# Patient Record
Sex: MALE | Race: WHITE | Employment: OTHER
[De-identification: names, ages, dates, MRNs, and addresses within clinical notes are randomized per-mention and may not be internally consistent; named-entity substitution may affect disease eponyms.]

---

## 2023-03-07 ENCOUNTER — TELEPHONE (OUTPATIENT)
Dept: CASE MANAGEMENT | Age: 78
End: 2023-03-07

## 2023-03-07 VITALS — HEIGHT: 65 IN

## 2023-03-07 NOTE — TELEPHONE ENCOUNTER
I called the patient and he confirmed his CT lung screening at [facility] on 3/8/2023 at 7:00 am.  I reminded the patient to arrive at 6:45 am, enter through the main entrance, and register. Patient confirmed.           Electronically signed by Taryn Neely on 3/7/23 at 10:35 AM EST

## 2023-03-08 ENCOUNTER — HOSPITAL ENCOUNTER (OUTPATIENT)
Dept: CT IMAGING | Age: 78
Discharge: HOME | End: 2023-03-10
Payer: OTHER GOVERNMENT

## 2023-03-08 DIAGNOSIS — F17.200 CURRENT SMOKER: ICD-10-CM

## 2023-03-08 PROCEDURE — 71271 CT THORAX LUNG CANCER SCR C-: CPT

## 2023-03-09 ENCOUNTER — TELEPHONE (OUTPATIENT)
Dept: CASE MANAGEMENT | Age: 78
End: 2023-03-09

## 2023-03-09 NOTE — TELEPHONE ENCOUNTER
No call, encounter opened to process CT Lung Screening. CT Lung Screen: 3/8/2023    Impression   1. There is no pulmonary infiltrate or suspicious pulmonary mass   2. Emphysematous change   3. Pleural thickening seen within the right lung base medially   4. 3 pleural based soft tissue nodule seen within the right lung base   abutting the hemidiaphragm, largest measures 6 mm. 5. Within the lumen of the trachea of posteriorly there is a 1.4 x 1.0 cm   soft tissue density which could represent retained lay of mucous secretions. LUNG RADS:   Lung-RADS 2 - Benign ()       Management:  12 month screening LDCT       RECOMMENDATIONS:   If you would like to register your patient with the [facility], please contact the Nurse Navigator at   [phone]. Pack years: 54    Social History     Tobacco Use  Smoking Status: Current Every Day Smoker    Start Date: 55   Quit Date:    Types: Cigarettes   Packs/Day: 1   Years: 61   Pack Years: 61   Smokeless Tobacco:         Results letter sent to patient via my chart or mailed.      [facility]

## 2024-03-29 ENCOUNTER — HOSPITAL ENCOUNTER (OUTPATIENT)
Dept: CT IMAGING | Age: 79
Discharge: HOME | End: 2024-03-29
Payer: OTHER GOVERNMENT

## 2024-03-29 DIAGNOSIS — Z72.0 TOBACCO USE: ICD-10-CM

## 2024-03-29 DIAGNOSIS — F17.200 CURRENT SMOKER: ICD-10-CM

## 2024-03-29 PROCEDURE — 71271 CT THORAX LUNG CANCER SCR C-: CPT

## 2024-04-01 ENCOUNTER — TELEPHONE (OUTPATIENT)
Dept: CASE MANAGEMENT | Age: 79
End: 2024-04-01

## 2024-04-01 NOTE — TELEPHONE ENCOUNTER
No call, encounter opened to process CT Lung Screening.    CT Lung Screen: 3/29/2024    IMPRESSION:  1. Markedly abnormal irregular pleural thickening seen throughout the right  lung extending into the minor and major fissures.  The irregular pleural  thickening is most prominent within the right lung base medially and measure  up to 4.0 cm in maximum with and extends along the medial pleural surface  into the right infrahilar region.  Malignancy is suspected.  PET-CT scan is  recommended followed by CT-guided biopsy.  2. Multiple nodule seen within the right lung more prominent within the right  lower lobe.  The largest measures 5.5 mm.  3. Shotty lymphadenopathy within the right paratracheal region.  4. The left lung is clear..     LUNG RADS:  Lung-RADS 4B - Very Suspicious (v2022)     Management:; PET/CT may be considered if there is a >= 8 mm solid nodule or  solid component; tissue sampling; and/or referral for further clinical  evaluation.  Management depends on clinical evaluation, patient preference,  and the probability of malignancy.     RECOMMENDATIONS:  If you would like to register your patient with the [facility] Lung Nodule/Lung  Cancer Screening Program, please contact the Nurse Navigator at  [phone].     Pack years: 64.2    Social History     Tobacco Use  Smoking Status: Current Every Day Smoker    Start Date: 1960   Quit Date:    Types: Cigarettes   Packs/Day: 1   Years: 64.2   Pack Years: 64.2   Smokeless Tobacco:         Results letter sent to patient via my chart or mailed.     Harrison Taylor  Imaging Navigator   [facility]   [phone]

## 2024-04-03 ENCOUNTER — APPOINTMENT (OUTPATIENT)
Dept: CT IMAGING | Age: 79
End: 2024-04-03
Payer: OTHER GOVERNMENT

## 2024-04-03 ENCOUNTER — HOSPITAL ENCOUNTER (OUTPATIENT)
Age: 79
Setting detail: OBSERVATION
Discharge: HOME | End: 2024-04-05
Attending: EMERGENCY MEDICINE | Admitting: INTERNAL MEDICINE
Payer: OTHER GOVERNMENT

## 2024-04-03 DIAGNOSIS — R91.8 PULMONARY NODULES/LESIONS, MULTIPLE: ICD-10-CM

## 2024-04-03 DIAGNOSIS — N20.0 KIDNEY STONE: Primary | ICD-10-CM

## 2024-04-03 PROBLEM — J90 RECURRENT RIGHT PLEURAL EFFUSION: Status: ACTIVE | Noted: 2024-04-03

## 2024-04-03 PROBLEM — J90 PLEURAL EFFUSION: Status: ACTIVE | Noted: 2024-04-03

## 2024-04-03 LAB
ANION GAP SERPL CALCULATED.3IONS-SCNC: 11 MMOL/L (ref 7–16)
BACTERIA URNS QL MICRO: ABNORMAL
BILIRUB UR QL STRIP: NEGATIVE
BUN SERPL-MCNC: 14 MG/DL (ref 6–23)
CALCIUM SERPL-MCNC: 9.5 MG/DL (ref 8.6–10.2)
CHLORIDE SERPL-SCNC: 103 MMOL/L (ref 98–107)
CLARITY UR: CLEAR
CO2 SERPL-SCNC: 25 MMOL/L (ref 22–29)
COLOR UR: YELLOW
CREAT SERPL-MCNC: 1.1 MG/DL (ref 0.7–1.2)
ERYTHROCYTE [DISTWIDTH] IN BLOOD BY AUTOMATED COUNT: 13.3 % (ref 11.5–15)
GFR SERPL CREATININE-BSD FRML MDRD: 71 ML/MIN/1.73M2
GLUCOSE SERPL-MCNC: 121 MG/DL (ref 74–99)
GLUCOSE UR STRIP-MCNC: NEGATIVE MG/DL
HCT VFR BLD AUTO: 51.6 % (ref 37–54)
HGB BLD-MCNC: 17.1 G/DL (ref 12.5–16.5)
HGB UR QL STRIP.AUTO: ABNORMAL
KETONES UR STRIP-MCNC: NEGATIVE MG/DL
LACTATE BLDV-SCNC: 2.6 MMOL/L (ref 0.5–2.2)
LEUKOCYTE ESTERASE UR QL STRIP: ABNORMAL
MCH RBC QN AUTO: 30.1 PG (ref 26–35)
MCHC RBC AUTO-ENTMCNC: 33.1 G/DL (ref 32–34.5)
MCV RBC AUTO: 90.8 FL (ref 80–99.9)
MUCOUS THREADS URNS QL MICRO: PRESENT
NITRITE UR QL STRIP: NEGATIVE
PH UR STRIP: 6 [PH] (ref 5–9)
PLATELET # BLD AUTO: 281 K/UL (ref 130–450)
PMV BLD AUTO: 9 FL (ref 7–12)
POTASSIUM SERPL-SCNC: 3.9 MMOL/L (ref 3.5–5)
PROT UR STRIP-MCNC: 30 MG/DL
RBC # BLD AUTO: 5.68 M/UL (ref 3.8–5.8)
RBC #/AREA URNS HPF: ABNORMAL /HPF
SODIUM SERPL-SCNC: 139 MMOL/L (ref 132–146)
SP GR UR STRIP: 1.02 (ref 1–1.03)
UROBILINOGEN UR STRIP-ACNC: 0.2 EU/DL (ref 0–1)
WBC #/AREA URNS HPF: ABNORMAL /HPF
WBC OTHER # BLD: 10 K/UL (ref 4.5–11.5)

## 2024-04-03 PROCEDURE — 96361 HYDRATE IV INFUSION ADD-ON: CPT

## 2024-04-03 PROCEDURE — 2580000003 HC RX 258: Performed by: EMERGENCY MEDICINE

## 2024-04-03 PROCEDURE — 96376 TX/PRO/DX INJ SAME DRUG ADON: CPT

## 2024-04-03 PROCEDURE — 6360000002 HC RX W HCPCS: Performed by: EMERGENCY MEDICINE

## 2024-04-03 PROCEDURE — 81001 URINALYSIS AUTO W/SCOPE: CPT

## 2024-04-03 PROCEDURE — 2580000003 HC RX 258: Performed by: HOSPITALIST

## 2024-04-03 PROCEDURE — 74176 CT ABD & PELVIS W/O CONTRAST: CPT

## 2024-04-03 PROCEDURE — 83605 ASSAY OF LACTIC ACID: CPT

## 2024-04-03 PROCEDURE — G0378 HOSPITAL OBSERVATION PER HR: HCPCS

## 2024-04-03 PROCEDURE — 99285 EMERGENCY DEPT VISIT HI MDM: CPT

## 2024-04-03 PROCEDURE — 6370000000 HC RX 637 (ALT 250 FOR IP): Performed by: EMERGENCY MEDICINE

## 2024-04-03 PROCEDURE — 87086 URINE CULTURE/COLONY COUNT: CPT

## 2024-04-03 PROCEDURE — 80048 BASIC METABOLIC PNL TOTAL CA: CPT

## 2024-04-03 PROCEDURE — 96375 TX/PRO/DX INJ NEW DRUG ADDON: CPT

## 2024-04-03 PROCEDURE — 96374 THER/PROPH/DIAG INJ IV PUSH: CPT

## 2024-04-03 PROCEDURE — 85027 COMPLETE CBC AUTOMATED: CPT

## 2024-04-03 RX ORDER — MAGNESIUM SULFATE IN WATER 40 MG/ML
2000 INJECTION, SOLUTION INTRAVENOUS PRN
Status: DISCONTINUED | OUTPATIENT
Start: 2024-04-03 | End: 2024-04-05 | Stop reason: HOSPADM

## 2024-04-03 RX ORDER — ACETAMINOPHEN 325 MG/1
650 TABLET ORAL EVERY 6 HOURS PRN
Status: DISCONTINUED | OUTPATIENT
Start: 2024-04-03 | End: 2024-04-05 | Stop reason: HOSPADM

## 2024-04-03 RX ORDER — POLYETHYLENE GLYCOL 3350 17 G/17G
17 POWDER, FOR SOLUTION ORAL DAILY PRN
Status: DISCONTINUED | OUTPATIENT
Start: 2024-04-03 | End: 2024-04-05 | Stop reason: HOSPADM

## 2024-04-03 RX ORDER — POTASSIUM CHLORIDE 7.45 MG/ML
10 INJECTION INTRAVENOUS PRN
Status: DISCONTINUED | OUTPATIENT
Start: 2024-04-03 | End: 2024-04-05 | Stop reason: HOSPADM

## 2024-04-03 RX ORDER — ACETAMINOPHEN 650 MG/1
650 SUPPOSITORY RECTAL EVERY 6 HOURS PRN
Status: DISCONTINUED | OUTPATIENT
Start: 2024-04-03 | End: 2024-04-05 | Stop reason: HOSPADM

## 2024-04-03 RX ORDER — KETOROLAC TROMETHAMINE 15 MG/ML
15 INJECTION, SOLUTION INTRAMUSCULAR; INTRAVENOUS ONCE
Status: COMPLETED | OUTPATIENT
Start: 2024-04-03 | End: 2024-04-03

## 2024-04-03 RX ORDER — SODIUM CHLORIDE 9 MG/ML
INJECTION, SOLUTION INTRAVENOUS PRN
Status: DISCONTINUED | OUTPATIENT
Start: 2024-04-03 | End: 2024-04-05 | Stop reason: HOSPADM

## 2024-04-03 RX ORDER — SODIUM CHLORIDE 0.9 % (FLUSH) 0.9 %
5-40 SYRINGE (ML) INJECTION PRN
Status: DISCONTINUED | OUTPATIENT
Start: 2024-04-03 | End: 2024-04-05 | Stop reason: HOSPADM

## 2024-04-03 RX ORDER — TAMSULOSIN HYDROCHLORIDE 0.4 MG/1
0.4 CAPSULE ORAL DAILY
Status: DISCONTINUED | OUTPATIENT
Start: 2024-04-03 | End: 2024-04-05 | Stop reason: HOSPADM

## 2024-04-03 RX ORDER — ONDANSETRON 4 MG/1
4 TABLET, ORALLY DISINTEGRATING ORAL EVERY 8 HOURS PRN
Status: DISCONTINUED | OUTPATIENT
Start: 2024-04-03 | End: 2024-04-05 | Stop reason: HOSPADM

## 2024-04-03 RX ORDER — ONDANSETRON 2 MG/ML
4 INJECTION INTRAMUSCULAR; INTRAVENOUS ONCE
Status: COMPLETED | OUTPATIENT
Start: 2024-04-03 | End: 2024-04-03

## 2024-04-03 RX ORDER — POTASSIUM CHLORIDE 20 MEQ/1
40 TABLET, EXTENDED RELEASE ORAL PRN
Status: DISCONTINUED | OUTPATIENT
Start: 2024-04-03 | End: 2024-04-05 | Stop reason: HOSPADM

## 2024-04-03 RX ORDER — ONDANSETRON 2 MG/ML
4 INJECTION INTRAMUSCULAR; INTRAVENOUS EVERY 6 HOURS PRN
Status: DISCONTINUED | OUTPATIENT
Start: 2024-04-03 | End: 2024-04-05 | Stop reason: HOSPADM

## 2024-04-03 RX ORDER — ENOXAPARIN SODIUM 100 MG/ML
40 INJECTION SUBCUTANEOUS DAILY
Status: DISCONTINUED | OUTPATIENT
Start: 2024-04-03 | End: 2024-04-05 | Stop reason: HOSPADM

## 2024-04-03 RX ORDER — ATORVASTATIN CALCIUM 10 MG/1
5 TABLET, FILM COATED ORAL DAILY
COMMUNITY

## 2024-04-03 RX ORDER — ACETAMINOPHEN 160 MG
2000 TABLET,DISINTEGRATING ORAL DAILY
COMMUNITY

## 2024-04-03 RX ORDER — SODIUM CHLORIDE 0.9 % (FLUSH) 0.9 %
5-40 SYRINGE (ML) INJECTION EVERY 12 HOURS SCHEDULED
Status: DISCONTINUED | OUTPATIENT
Start: 2024-04-03 | End: 2024-04-05 | Stop reason: HOSPADM

## 2024-04-03 RX ORDER — ASPIRIN 81 MG/1
81 TABLET, CHEWABLE ORAL DAILY
COMMUNITY

## 2024-04-03 RX ORDER — KETOROLAC TROMETHAMINE 15 MG/ML
15 INJECTION, SOLUTION INTRAMUSCULAR; INTRAVENOUS EVERY 6 HOURS PRN
Status: DISCONTINUED | OUTPATIENT
Start: 2024-04-03 | End: 2024-04-05 | Stop reason: HOSPADM

## 2024-04-03 RX ORDER — 0.9 % SODIUM CHLORIDE 0.9 %
1000 INTRAVENOUS SOLUTION INTRAVENOUS ONCE
Status: COMPLETED | OUTPATIENT
Start: 2024-04-03 | End: 2024-04-03

## 2024-04-03 RX ADMIN — SODIUM CHLORIDE, PRESERVATIVE FREE 10 ML: 5 INJECTION INTRAVENOUS at 23:56

## 2024-04-03 RX ADMIN — SODIUM CHLORIDE 1000 ML: 9 INJECTION, SOLUTION INTRAVENOUS at 11:01

## 2024-04-03 RX ADMIN — KETOROLAC TROMETHAMINE 15 MG: 15 INJECTION, SOLUTION INTRAMUSCULAR; INTRAVENOUS at 16:27

## 2024-04-03 RX ADMIN — KETOROLAC TROMETHAMINE 15 MG: 15 INJECTION, SOLUTION INTRAMUSCULAR; INTRAVENOUS at 11:00

## 2024-04-03 RX ADMIN — ONDANSETRON 4 MG: 2 INJECTION INTRAMUSCULAR; INTRAVENOUS at 11:00

## 2024-04-03 RX ADMIN — TAMSULOSIN HYDROCHLORIDE 0.4 MG: 0.4 CAPSULE ORAL at 12:46

## 2024-04-03 ASSESSMENT — PAIN DESCRIPTION - ONSET: ONSET: ON-GOING

## 2024-04-03 ASSESSMENT — LIFESTYLE VARIABLES
HOW OFTEN DO YOU HAVE A DRINK CONTAINING ALCOHOL: NEVER
HOW MANY STANDARD DRINKS CONTAINING ALCOHOL DO YOU HAVE ON A TYPICAL DAY: PATIENT DOES NOT DRINK

## 2024-04-03 ASSESSMENT — PAIN DESCRIPTION - PAIN TYPE: TYPE: ACUTE PAIN

## 2024-04-03 ASSESSMENT — PAIN SCALES - GENERAL
PAINLEVEL_OUTOF10: 0
PAINLEVEL_OUTOF10: 10

## 2024-04-03 ASSESSMENT — PAIN DESCRIPTION - ORIENTATION: ORIENTATION: RIGHT;LOWER

## 2024-04-03 ASSESSMENT — PAIN - FUNCTIONAL ASSESSMENT
PAIN_FUNCTIONAL_ASSESSMENT: 0-10
PAIN_FUNCTIONAL_ASSESSMENT: NONE - DENIES PAIN

## 2024-04-03 ASSESSMENT — PAIN DESCRIPTION - FREQUENCY: FREQUENCY: CONTINUOUS

## 2024-04-03 ASSESSMENT — PAIN DESCRIPTION - LOCATION: LOCATION: BACK

## 2024-04-03 NOTE — PROGRESS NOTES
Consult completed to Dr. Juarez at this time    Electronically signed by Yasemin Torre RN on 4/3/2024 at 6:55 PM

## 2024-04-03 NOTE — CARE COORDINATION
Called to admit patient with kidney stone. Pain resolved, found to have recurrent pulmonary nodules worsening, now with effusion. Has not seen pulm or followed up as previously instructed. Will admit for further evaluation.    Admit orders placed  Will see in the AM  Pulm consulted.    Electronically signed by Keenan Stauffer MD on 4/3/2024 at 1:54 PM

## 2024-04-03 NOTE — ED PROVIDER NOTES
HPI:  4/3/24, Time: 10:25 AM EDT         Terell Martinez is a 78 y.o. male presenting to the ED for sudden onset RT flank pain NV, beginning several hours ago.  The complaint has been persistent, severe in severity, and worsened by nothing.  Patient said the pain just sharp radiating to the right flank area.  Onset while he was urinating this morning.  He denies any hematuria.  No previous history of renal calculi.  No fevers or chills reported.    Review of Systems:   A complete review of systems was performed and pertinent positives and negatives are stated within HPI, all other systems reviewed and are negative.    --------------------------------------------- PAST HISTORY ---------------------------------------------  Past Medical History:  has no past medical history on file.    Past Surgical History:  has no past surgical history on file.    Social History:  reports that he has been smoking cigarettes. He started smoking about 64 years ago. He has a 64.3 pack-year smoking history. He does not have any smokeless tobacco history on file.    Family History: family history is not on file.     The patient’s home medications have been reviewed.    Allergies: Patient has no known allergies.    -------------------------------------------------- RESULTS -------------------------------------------------  All laboratory and radiology results have been personally reviewed by myself   LABS:  Results for orders placed or performed during the hospital encounter of 04/03/24   CBC   Result Value Ref Range    WBC 10.0 4.5 - 11.5 k/uL    RBC 5.68 3.80 - 5.80 m/uL    Hemoglobin 17.1 (H) 12.5 - 16.5 g/dL    Hematocrit 51.6 37.0 - 54.0 %    MCV 90.8 80.0 - 99.9 fL    MCH 30.1 26.0 - 35.0 pg    MCHC 33.1 32.0 - 34.5 g/dL    RDW 13.3 11.5 - 15.0 %    Platelets 281 130 - 450 k/uL    MPV 9.0 7.0 - 12.0 fL   Basic Metabolic Panel   Result Value Ref Range    Sodium 139 132 - 146 mmol/L    Potassium 3.9 3.5 - 5.0 mmol/L    Chloride 103 98  extending into the minor major fissures.  Multiple pulmonary nodules seen in the right lung with the largest measuring 5.5 mm.  Left lung is clear.  At time they recommend PET CT scan.  Followed by CT-guided biopsy.    Records Reviewed: as above        I am the Primary Clinician of Record.    DISPOSITION: Admit to telemetry     ED COURSE:  ED Course as of 04/05/24 0547 Wed Apr 03, 2024   1358 Consultation was made with Dr. Stauffer from [facility] to admit to the hospital. [JN]      ED Course User Index  [JN] Misbah Castillo DO       Counseling:   The emergency provider has spoken with the patient and discussed today’s results, in addition to providing specific details for the plan of care and counseling regarding the diagnosis and prognosis.  Questions are answered at this time and they are agreeable with the plan.      --------------------------------- IMPRESSION AND DISPOSITION ---------------------------------    IMPRESSION  1. Kidney stone    2. Pulmonary nodules/lesions, multiple        DISPOSITION  Disposition: Admit to telemetry  Patient condition is stable      NOTE: This report was transcribed using voice recognition software. Every effort was made to ensure accuracy; however, inadvertent computerized transcription errors may be present       Misbah Castillo DO  04/05/24 0547

## 2024-04-03 NOTE — ED NOTES
ED to Inpatient Handoff Report    Notified warren   that electronic handoff available and patient ready for transport to room 539.    Safety Risks: None identified    Patient in Restraints: no    Constant Observer or Patient : no    Telemetry Monitoring Ordered: No          Order to transfer to unit without monitor: NO    Last MEWS: 1 Time completed: 1808    Deterioration Index: 20.33    Vitals:    04/03/24 1007 04/03/24 1439   BP: 125/70 136/77   Pulse: 71 79   Resp: 16 18   Temp: 97.5 °F (36.4 °C) 97.5 °F (36.4 °C)   TempSrc: Temporal Oral   SpO2: 100% 96%   Weight: 76.2 kg (168 lb)    Height: 1.651 m (5' 5\")        Opportunity for questions and clarification was provided.

## 2024-04-04 LAB
ANION GAP SERPL CALCULATED.3IONS-SCNC: 9 MMOL/L (ref 7–16)
BASOPHILS # BLD: 0.04 K/UL (ref 0–0.2)
BASOPHILS NFR BLD: 1 % (ref 0–2)
BUN SERPL-MCNC: 15 MG/DL (ref 6–23)
CALCIUM SERPL-MCNC: 9.1 MG/DL (ref 8.6–10.2)
CHLORIDE SERPL-SCNC: 108 MMOL/L (ref 98–107)
CO2 SERPL-SCNC: 25 MMOL/L (ref 22–29)
CREAT SERPL-MCNC: 1.1 MG/DL (ref 0.7–1.2)
EOSINOPHIL # BLD: 0.15 K/UL (ref 0.05–0.5)
EOSINOPHILS RELATIVE PERCENT: 2 % (ref 0–6)
ERYTHROCYTE [DISTWIDTH] IN BLOOD BY AUTOMATED COUNT: 13.4 % (ref 11.5–15)
GFR SERPL CREATININE-BSD FRML MDRD: 71 ML/MIN/1.73M2
GLUCOSE SERPL-MCNC: 107 MG/DL (ref 74–99)
HCT VFR BLD AUTO: 44.3 % (ref 37–54)
HGB BLD-MCNC: 14.6 G/DL (ref 12.5–16.5)
IMM GRANULOCYTES # BLD AUTO: <0.03 K/UL (ref 0–0.58)
IMM GRANULOCYTES NFR BLD: 0 % (ref 0–5)
INR PPP: 1.1
LYMPHOCYTES NFR BLD: 1.31 K/UL (ref 1.5–4)
LYMPHOCYTES RELATIVE PERCENT: 19 % (ref 20–42)
MCH RBC QN AUTO: 29.6 PG (ref 26–35)
MCHC RBC AUTO-ENTMCNC: 33 G/DL (ref 32–34.5)
MCV RBC AUTO: 89.9 FL (ref 80–99.9)
MICROORGANISM SPEC CULT: NO GROWTH
MONOCYTES NFR BLD: 0.54 K/UL (ref 0.1–0.95)
MONOCYTES NFR BLD: 8 % (ref 2–12)
NEUTROPHILS NFR BLD: 71 % (ref 43–80)
NEUTS SEG NFR BLD: 4.96 K/UL (ref 1.8–7.3)
PLATELET # BLD AUTO: 229 K/UL (ref 130–450)
PMV BLD AUTO: 8.9 FL (ref 7–12)
POTASSIUM SERPL-SCNC: 3.7 MMOL/L (ref 3.5–5)
PROTHROMBIN TIME: 12.8 SEC (ref 9.3–12.4)
RBC # BLD AUTO: 4.93 M/UL (ref 3.8–5.8)
SODIUM SERPL-SCNC: 142 MMOL/L (ref 132–146)
SPECIMEN DESCRIPTION: NORMAL
WBC OTHER # BLD: 7 K/UL (ref 4.5–11.5)

## 2024-04-04 PROCEDURE — 96376 TX/PRO/DX INJ SAME DRUG ADON: CPT

## 2024-04-04 PROCEDURE — 36415 COLL VENOUS BLD VENIPUNCTURE: CPT

## 2024-04-04 PROCEDURE — 80048 BASIC METABOLIC PNL TOTAL CA: CPT

## 2024-04-04 PROCEDURE — 2580000003 HC RX 258: Performed by: HOSPITALIST

## 2024-04-04 PROCEDURE — G0378 HOSPITAL OBSERVATION PER HR: HCPCS

## 2024-04-04 PROCEDURE — 2580000003 HC RX 258: Performed by: UROLOGY

## 2024-04-04 PROCEDURE — 85610 PROTHROMBIN TIME: CPT

## 2024-04-04 PROCEDURE — 6370000000 HC RX 637 (ALT 250 FOR IP): Performed by: HOSPITALIST

## 2024-04-04 PROCEDURE — 85025 COMPLETE CBC W/AUTO DIFF WBC: CPT

## 2024-04-04 PROCEDURE — 6360000002 HC RX W HCPCS: Performed by: HOSPITALIST

## 2024-04-04 PROCEDURE — 96361 HYDRATE IV INFUSION ADD-ON: CPT

## 2024-04-04 RX ORDER — SODIUM CHLORIDE 450 MG/100ML
INJECTION, SOLUTION INTRAVENOUS CONTINUOUS
Status: DISCONTINUED | OUTPATIENT
Start: 2024-04-04 | End: 2024-04-05 | Stop reason: HOSPADM

## 2024-04-04 RX ADMIN — SODIUM CHLORIDE, PRESERVATIVE FREE 10 ML: 5 INJECTION INTRAVENOUS at 09:41

## 2024-04-04 RX ADMIN — SODIUM CHLORIDE: 4.5 INJECTION, SOLUTION INTRAVENOUS at 18:18

## 2024-04-04 RX ADMIN — TAMSULOSIN HYDROCHLORIDE 0.4 MG: 0.4 CAPSULE ORAL at 08:59

## 2024-04-04 RX ADMIN — KETOROLAC TROMETHAMINE 15 MG: 15 INJECTION, SOLUTION INTRAMUSCULAR; INTRAVENOUS at 09:35

## 2024-04-04 NOTE — PROGRESS NOTES
Dr. Stauffer notified of patients complaint of flank pain per RN    Electronically signed by Yasemin Torre RN on 4/4/2024 at 9:06 AM

## 2024-04-04 NOTE — PROGRESS NOTES
Physical Therapy  Facility/Department: [address] MED SURG/TELE    Name: Terell Martinez  : 1945  MRN: [id number]  Date of Service: 2024    PT consult was received and eval was attempted.  Pt was observed ambulating in the room, transferring, and performing bed mobility Independently and has no skilled PT needs at this time.  Will discharge pt from our service.    Moose Siu Jr., P.T.  License Number: [id number]

## 2024-04-04 NOTE — PROGRESS NOTES
4 Eyes Skin Assessment     NAME:  Terell Martinez  YOB: 1945  MEDICAL RECORD NUMBER:  [id number]    The patient is being assessed for  Admission    I agree that at least one RN has performed a thorough Head to Toe Skin Assessment on the patient. ALL assessment sites listed below have been assessed.      Areas assessed by both nurses:    Head, Face, Ears, Shoulders, Back, Chest, Arms, Elbows, Hands, Sacrum. Buttock, Coccyx, Ischium, Legs. Feet and Heels, Under Medical Devices , and Other          Does the Patient have a Wound? No noted wound(s)       Tello Prevention initiated by RN: No  Wound Care Orders initiated by RN: No    Pressure Injury (Stage 3,4, Unstageable, DTI, NWPT, and Complex wounds) if present, place Wound referral order by RN under : No    New Ostomies, if present place, Ostomy referral order under : No     Nurse 1 eSignature: Electronically signed by Lana Mariano RN on 4/4/24 at 2:10 AM EDT    **SHARE this note so that the co-signing nurse can place an eSignature**    Nurse 2 eSignature: Electronically signed by Sherry Perez RN on 4/4/24 at 2:24 AM EDT

## 2024-04-04 NOTE — PROGRESS NOTES
Primary RN spoke with IR ([place]) regarding plan for bx - plan for tomorrow. Diet resumed and made NPO at midnight for tomorrow.     Electronically signed by Yasemin Torre RN on 4/4/2024 at 11:18 AM

## 2024-04-04 NOTE — PROGRESS NOTES
Spoke to floor regarding IR protocol for Lung biopsy. Will await results of co-ags and discuss with IR physician blood thinner wait times. Procedure will not occur on 4/4/2024. Further communication after.    Nathanael Mujica IR

## 2024-04-04 NOTE — CONSULTS
SubCUTAneous, Daily  ondansetron (ZOFRAN-ODT) disintegrating tablet 4 mg, 4 mg, Oral, Q8H PRN **OR** ondansetron (ZOFRAN) injection 4 mg, 4 mg, IntraVENous, Q6H PRN  polyethylene glycol (GLYCOLAX) packet 17 g, 17 g, Oral, Daily PRN  acetaminophen (TYLENOL) tablet 650 mg, 650 mg, Oral, Q6H PRN **OR** acetaminophen (TYLENOL) suppository 650 mg, 650 mg, Rectal, Q6H PRN    Allergies:  Patient has no known allergies.    Social History:    TOBACCO:   reports that he has been smoking cigarettes. He started smoking about 64 years ago. He has a 63.1 pack-year smoking history. He does not have any smokeless tobacco history on file.    Family History:   No family history on file.    REVIEW OF SYSTEMS:   UROLOGIC: pain with urination   MUSCULOSKELETAL:  back pain  Remainder of complete ROS is negative.    PHYSICAL EXAM:      Vitals:    BP (!) 118/57   Pulse 65   Temp 98.5 °F (36.9 °C) (Oral)   Resp 16   Ht 1.651 m (5' 5\")   Wt 79.5 kg (175 lb 3.2 oz)   SpO2 95%   BMI 29.15 kg/m²     EYES:  Lids and lashes normal, pupils equal, round and reactive to light, extra ocular muscles intact, sclera clear, conjunctiva normal  ENT:  Normocephalic, without obvious abnormality, atraumatic, sinuses nontender on palpation, external ears without lesions, oral pharynx with moist mucus membranes, tonsils without erythema or exudates, gums normal and good dentition.  LUNGS:  No increased work of breathing, good air exchange, clear to auscultation bilaterally, no crackles or wheezing  CARDIOVASCULAR:  Normal apical impulse, regular rate and rhythm, normal S1 and S2, no S3 or S4, and no murmur noted  ABDOMEN:  No scars, normal bowel sounds, soft, non-distended, non-tender, no masses palpated, no hepatosplenomegally  MUSCULOSKELETAL:  There is no redness, warmth, or swelling of the joints.  Full range of motion noted.  NEUROLOGIC:  Awake, alert, oriented to name, place and time.  Cranial nerves II-XII are grossly intact.    DATA:    CBC:    Recent Labs     04/03/24  1029   WBC 10.0   HGB 17.1*   HCT 51.6   MCV 90.8          BMP:  Recent Labs     04/03/24  1029      K 3.9      CO2 25   BUN 14   CREATININE 1.1    ALB:3,BILIDIR:3,BILITOT:3,ALKPHOS:3)@    PT/INR: No results for input(s): \"PROTIME\", \"INR\" in the last 72 hours.    ABG:   No results for input(s): \"PH\", \"PO2\", \"PCO2\", \"HCO3\", \"BE\", \"O2SAT\", \"METHB\", \"O2HB\", \"COHB\", \"O2CON\", \"HHB\", \"THB\" in the last 72 hours.          Radiology Review:  CT abdomen lower lung cuts with large right lower lobe mass with small peripheral right lower lobe nodules and tiny right pleural effusion looking mostly similar compared to CT chest a week ago    IMPRESSION/RECOMMENDATIONS:      Lung mass  Ureteral calculus    Discussed with pt need for IR biopsy of right mass to see if cancerous or not, he is agreeable to this, will find out about timing of procedure  Very small effusion, thoracentesis not indicated at this time  Ureteral calculus and pain medications as per PCP  On RA and no lung medications, not really complaining of any issues with breathing currently, observe for now  OOB to chair, ambulate as tolerated      Time at the bedside, reviewing labs and radiographs, reviewing notes and consultations, discussing with staff and family was more than 55 minutes.    Thanks for letting us see this patient in consultation.  Please contact us with any questions. Office [phone] or after hours through [employer], x [phone].

## 2024-04-04 NOTE — CARE COORDINATION
4/4/2024  Social Work Discharge Planning: Pulm consulted. Kidney stones. Possible lung biopsy.This worker met with Pt to discuss  role and transition of care/discharge planning.Pt is form home alone and independent with no DME.  Room air. Pt has two sons out of state but doesn't have contact info with him. No HC POA. Pt uses [facility] and PCP is Dr. M.Awadalla. Electronically signed by INDIRA Warren on 4/4/2024 at 9:13 AM

## 2024-04-04 NOTE — ACP (ADVANCE CARE PLANNING)
4/4/2024  Advance Care Planning   Healthcare Decision Maker:   no current HC POA      Click here to complete Healthcare Decision Makers including selection of the Healthcare Decision Maker Relationship (ie \"Primary\").             [FreeTextEntry1] : HIV stable

## 2024-04-04 NOTE — PROGRESS NOTES
Occupational Therapy  OT consult received to eval/treat and chart review complete. Patient reports he is functioning at baseline level of independent. Patient reports he has been up independently since admission and has no concerns related to functional abilities. Patient politely declines need for OT evaluation at this time. No OT evaluation complete per patient preference.      Gina Reyes OTR/L  License Number: [id number]

## 2024-04-04 NOTE — H&P
Internal Medicine History & Physical     Name: Terell Martinez  : 1945  Chief Complaint: Back Pain (R sided lower back pain, onset this am, no hx of stones.)  Primary Care Physician: Awadalla, Maged I, MD  Admission date: 4/3/2024  Date of service: 2024     History of Present Illness  Terell is a 78 y.o. year old male.  Patient comes to the hospital with severe 10 out of 10 pain in the right flank.  Patient states that the pain began after he finished smoking a cigarette and then went into use the bathroom.  He sat down and then was in pain immediately.  Denies any blood in the urine, dysuria, constipation, diarrhea.  Patient states he did not fall or injure himself recently.  Patient states that the pain was so severe he came to the hospital for evaluation as he could not figure out a way to move.  Patient states he been eating and drinking well.  No recent nausea or vomiting.  Able to eat and drink today.  Patient had a recent CT scan done on 3/29 here at this hospital and results were being called to him today.  Patient was seen in the ER and found to have evidence of a pleural effusion and concern for pulmonary nodules that were increasing in size.  Pulmonology has evaluated the patient is concerned that these nodules could be related to a cancer given his smoking history and age.  Additionally, the patient states that he is having no pain currently in the right flank of the right side.  States that it started in the mid back and radiated around the side.  Patient states nothing was helping him feel better at home.  Continue to worsen.  Came to the hospital for further evaluation.  Patient states pain was a stabbing pain that brought him to tears.      ED course:   Initial blood work and imaging studies performed. Admission recommended by ED physician. Case was discussed with ED provider. Meds in ED consisted of the following:  Medications   tamsulosin (FLOMAX) capsule 0.4 mg (0.4 mg Oral Given 24 0859)  mood  Eyes: conjunctivae/corneas clear, sclera non icteric, EOMI  Ears: no obvious scars, no lesions, no masses, hearing intact  Mouth: mucous membranes moist, no obvious oral sores, missing several teeth  Head: normocephalic, atraumatic  Neck: no JVD, no adenopathy, no thyromegaly, neck is supple, trachea is midline  Back: ROM normal, no CVA tenderness.  Mild pain on palpation around the T10-T8 range  Chest: no pain on palpation  Lungs: clear to auscultation bilaterally, without rhonchi, crackle, wheezing, or rale, no retractions or use of accessory muscles  Heart: regular rate and regular rhythm, no murmur, normal S1, S2  Abdomen: soft, non-tender; bowel sounds normal; no masses, no organomegaly  : Deferred   Extremities: no lower extremity edema, extremities atraumatic, no cyanosis, no clubbing, 2+ pedal pulses palpated  Skin: normal color, normal texture, normal turgor, no rashes, no lesions  Neurologic:5/5 muscle strength throughout, normal muscle tone throughout, face symmetric, hearing intact, tongue midline, speech appropriate without slurring, sensation to fine touch intact in upper and lower extremities    Labs-   Lab Results   Component Value Date    WBC 7.0 04/04/2024    HGB 14.6 04/04/2024    HCT 44.3 04/04/2024     04/04/2024     04/04/2024    K 3.7 04/04/2024     (H) 04/04/2024    CREATININE 1.1 04/04/2024    BUN 15 04/04/2024    CO2 25 04/04/2024    GLUCOSE 107 (H) 04/04/2024    INR 1.1 04/04/2024     No results found for: \"CKTOTAL\", \"CKMB\", \"CKMBINDEX\", \"TROPONINI\"        Recent Radiological Studies:  CT ABDOMEN PELVIS WO CONTRAST Additional Contrast? None   Final Result   Obstructing 4 mm mid right ureteral calculus.      Cholelithiasis, without evidence of acute cholecystitis.      Right pleural effusion with multiple right inferior pleural nodules,   pulmonary nodules and a 6 cm subpleural solid density in the right lower lobe   are again identified and worrisome for a

## 2024-04-04 NOTE — PROGRESS NOTES
Spoke with Dr. Kumar regarding consult, see new orders for IVF and strain all urine. Primary RN updated.     Electronically signed by Yasemin Torre RN on 4/4/2024 at 5:37 PM

## 2024-04-05 ENCOUNTER — APPOINTMENT (OUTPATIENT)
Dept: CT IMAGING | Age: 79
End: 2024-04-05
Payer: OTHER GOVERNMENT

## 2024-04-05 ENCOUNTER — APPOINTMENT (OUTPATIENT)
Dept: GENERAL RADIOLOGY | Age: 79
End: 2024-04-05
Payer: OTHER GOVERNMENT

## 2024-04-05 VITALS
WEIGHT: 176 LBS | TEMPERATURE: 98.1 F | HEIGHT: 65 IN | RESPIRATION RATE: 18 BRPM | BODY MASS INDEX: 29.32 KG/M2 | SYSTOLIC BLOOD PRESSURE: 161 MMHG | HEART RATE: 62 BPM | OXYGEN SATURATION: 95 % | DIASTOLIC BLOOD PRESSURE: 72 MMHG

## 2024-04-05 PROCEDURE — 2500000003 HC RX 250 WO HCPCS: Performed by: RADIOLOGY

## 2024-04-05 PROCEDURE — G0378 HOSPITAL OBSERVATION PER HR: HCPCS

## 2024-04-05 PROCEDURE — 71046 X-RAY EXAM CHEST 2 VIEWS: CPT

## 2024-04-05 PROCEDURE — 6370000000 HC RX 637 (ALT 250 FOR IP): Performed by: HOSPITALIST

## 2024-04-05 PROCEDURE — 6360000002 HC RX W HCPCS: Performed by: HOSPITALIST

## 2024-04-05 PROCEDURE — 6360000002 HC RX W HCPCS: Performed by: RADIOLOGY

## 2024-04-05 PROCEDURE — 2709999900 CT NEEDLE BIOPSY LUNG PERCUTANEOUS W IMAGING GUIDANCE

## 2024-04-05 PROCEDURE — 88305 TISSUE EXAM BY PATHOLOGIST: CPT

## 2024-04-05 PROCEDURE — 96376 TX/PRO/DX INJ SAME DRUG ADON: CPT

## 2024-04-05 PROCEDURE — 2580000003 HC RX 258: Performed by: UROLOGY

## 2024-04-05 PROCEDURE — 96361 HYDRATE IV INFUSION ADD-ON: CPT

## 2024-04-05 RX ORDER — HYDROCODONE BITARTRATE AND ACETAMINOPHEN 5; 325 MG/1; MG/1
1 TABLET ORAL EVERY 6 HOURS PRN
Qty: 10 TABLET | Refills: 0 | Status: SHIPPED | OUTPATIENT
Start: 2024-04-05 | End: 2024-04-12

## 2024-04-05 RX ORDER — FENTANYL CITRATE 50 UG/ML
INJECTION, SOLUTION INTRAMUSCULAR; INTRAVENOUS PRN
Status: COMPLETED | OUTPATIENT
Start: 2024-04-05 | End: 2024-04-05

## 2024-04-05 RX ORDER — MIDAZOLAM HYDROCHLORIDE 2 MG/2ML
INJECTION, SOLUTION INTRAMUSCULAR; INTRAVENOUS PRN
Status: COMPLETED | OUTPATIENT
Start: 2024-04-05 | End: 2024-04-05

## 2024-04-05 RX ORDER — LIDOCAINE HYDROCHLORIDE 20 MG/ML
INJECTION, SOLUTION INFILTRATION; PERINEURAL PRN
Status: COMPLETED | OUTPATIENT
Start: 2024-04-05 | End: 2024-04-05

## 2024-04-05 RX ORDER — TAMSULOSIN HYDROCHLORIDE 0.4 MG/1
0.4 CAPSULE ORAL DAILY
Qty: 30 CAPSULE | Refills: 0 | Status: SHIPPED | OUTPATIENT
Start: 2024-04-06

## 2024-04-05 RX ADMIN — LIDOCAINE HYDROCHLORIDE 15 ML: 20 INJECTION, SOLUTION INFILTRATION; PERINEURAL at 10:54

## 2024-04-05 RX ADMIN — KETOROLAC TROMETHAMINE 15 MG: 15 INJECTION, SOLUTION INTRAMUSCULAR; INTRAVENOUS at 03:08

## 2024-04-05 RX ADMIN — SODIUM CHLORIDE: 4.5 INJECTION, SOLUTION INTRAVENOUS at 05:03

## 2024-04-05 RX ADMIN — MIDAZOLAM HYDROCHLORIDE 1 MG: 1 INJECTION, SOLUTION INTRAMUSCULAR; INTRAVENOUS at 10:53

## 2024-04-05 RX ADMIN — TAMSULOSIN HYDROCHLORIDE 0.4 MG: 0.4 CAPSULE ORAL at 12:19

## 2024-04-05 RX ADMIN — FENTANYL CITRATE 50 MCG: 50 INJECTION, SOLUTION INTRAMUSCULAR; INTRAVENOUS at 10:53

## 2024-04-05 ASSESSMENT — PAIN DESCRIPTION - ORIENTATION: ORIENTATION: RIGHT

## 2024-04-05 ASSESSMENT — PAIN DESCRIPTION - LOCATION: LOCATION: FLANK

## 2024-04-05 ASSESSMENT — PAIN SCALES - GENERAL
PAINLEVEL_OUTOF10: 0
PAINLEVEL_OUTOF10: 4
PAINLEVEL_OUTOF10: 0

## 2024-04-05 ASSESSMENT — PAIN - FUNCTIONAL ASSESSMENT: PAIN_FUNCTIONAL_ASSESSMENT: PREVENTS OR INTERFERES SOME ACTIVE ACTIVITIES AND ADLS

## 2024-04-05 ASSESSMENT — PAIN DESCRIPTION - PAIN TYPE: TYPE: ACUTE PAIN

## 2024-04-05 ASSESSMENT — PAIN DESCRIPTION - DESCRIPTORS: DESCRIPTORS: DISCOMFORT

## 2024-04-05 NOTE — PROGRESS NOTES
[facility]  [address], [address]  [place] [address]      Pulmonary Progress Note      SUBJECTIVE:  claims doing ok with breathing, on RA lying down in bed when seen, for IR biopsy of lung mass today    OBJECTIVE    Medications    Continuous Infusions:   sodium chloride 100 mL/hr at 24 0503    sodium chloride         Scheduled Meds:   tamsulosin  0.4 mg Oral Daily    sodium chloride flush  5-40 mL IntraVENous 2 times per day    [Held by provider] enoxaparin  40 mg SubCUTAneous Daily       PRN Meds:ketorolac, sodium chloride flush, sodium chloride, potassium chloride **OR** potassium alternative oral replacement **OR** potassium chloride, magnesium sulfate, ondansetron **OR** ondansetron, polyethylene glycol, acetaminophen **OR** acetaminophen    Physical    VITALS:  BP (!) 120/57   Pulse 59   Temp 98.5 °F (36.9 °C) (Oral)   Resp 16   Ht 1.651 m (5' 5\")   Wt 79.8 kg (176 lb)   SpO2 95%   BMI 29.29 kg/m²     24HR INTAKE/OUTPUT:      Intake/Output Summary (Last 24 hours) at 2024 0904  Last data filed at 2024 0458  Gross per 24 hour   Intake --   Output 400 ml   Net -400 ml       24HR PULSE OXIMETRY RANGE:    SpO2  Av %  Min: 93 %  Max: 95 %    General appearance: alert, appears stated age, and cooperative  Lungs: clear to auscultation bilaterally  Heart: regular rate and rhythm, S1, S2 normal, no murmur, click, rub or gallop  Abdomen: soft, non-tender; bowel sounds normal; no masses,  no organomegaly  Extremities: extremities normal, atraumatic, no cyanosis or edema  Neurologic: Mental status: Alert, oriented, thought content appropriate    Data    CBC:   Recent Labs     24  1029 24  0943   WBC 10.0 7.0   HGB 17.1* 14.6   HCT 51.6 44.3   MCV 90.8 89.9    229       BMP:  Recent Labs     24  1029 24  0943    142   K 3.9 3.7    108*   CO2 25 25   BUN 14 15   CREATININE 1.1 1.1     ALB:3,BILIDIR:3,BILITOT:3,ALKPHOS:3)@    PT/INR:   Recent Labs     04/04/24  0943   PROTIME 12.8*   INR 1.1       ABG:   No results for input(s): \"PH\", \"PO2\", \"PCO2\", \"HCO3\", \"BE\", \"O2SAT\", \"METHB\", \"O2HB\", \"COHB\", \"O2CON\", \"HHB\", \"THB\" in the last 72 hours.          Radiology/Other tests reviewed: none    Assessment:     Principal Problem:    Pleural effusion  Active Problems:    Renal calculus, right    Pulmonary nodules    Pleural effusion on right  Resolved Problems:    * No resolved hospital problems. *      Plan:       Get IR biopsy done  On RA and no lung medications, observe respiratory function  Can be discharged from pulmonary pov if remains stable post biopsy and ffup in office in 1-2 weeks      Time at the bedside, reviewing labs and radiographs, reviewing notes and consultations, discussing with staff and family was more than 50 minutes.      Thanks for letting us see this patient in consultation.  Please contact us with any questions. Office [phone] or after hours through [employer], x [phone].

## 2024-04-05 NOTE — CARE COORDINATION
4/5/2024  Social Work Discharge Planning: Rt flank pain and lung mas. Lung biopsy today. .Pt is from home alone and independent with no DME.  Room air. Pt has two sons out of state but doesn't have contact info with him. No HC POA. Will continue to follow with any discharge needs.Electronically signed by INDIRA Warren on 4/5/2024 at 10:00 AM

## 2024-04-05 NOTE — PROGRESS NOTES
Internal Medicine Progress Note    Patient's name: Terell Martinez  : 1945  Chief complaints (on day of admission): Back Pain (R sided lower back pain, onset this am, no hx of stones.)  Admission date: 4/3/2024  Date of service: 2024   Room: [address] MED SURG/TELE  Primary care physician: Awadalla, Maged I, MD  Reason for visit: Follow-up for right flank pain    Subjective  Terell is seen lying in bed asleep in no distress.  He does easily awaken to voice.  He reports being tired this morning.  He expresses that he was having some pain in his lower back which is better now after taking pain medication.  He denies any nausea or vomiting.  Denies any shortness of breath or difficulty breathing.  He reports that he has been straining his urine all night with nursing assistance, does not think they found any stones.  He is also asking about what time his procedure is today and if he will be able to go home later since he is feeling better.  No other issues or concerns from nursing.    Review of Systems  Full 10 point review of systems negative unless mentioned above.    Hospital Medications  Current Facility-Administered Medications   Medication Dose Route Frequency Provider Last Rate Last Admin    0.45 % sodium chloride infusion   IntraVENous Continuous Jones Kumar  mL/hr at 24 0503 New Bag at 24 0503    tamsulosin (FLOMAX) capsule 0.4 mg  0.4 mg Oral Daily Keenan Stauffer MD   0.4 mg at 24 0859    ketorolac (TORADOL) injection 15 mg  15 mg IntraVENous Q6H PRN Keenan Stauffer MD   15 mg at 24 0308    sodium chloride flush 0.9 % injection 5-40 mL  5-40 mL IntraVENous 2 times per day Keenan Stauffer MD   10 mL at 24 0941    sodium chloride flush 0.9 % injection 5-40 mL  5-40 mL IntraVENous PRN Keenan Stauffer MD        0.9 % sodium chloride infusion   IntraVENous PRN Keenan Stauffer MD        potassium chloride (KLOR-CON M) extended release tablet 40 mEq  40 mEq Oral PRN Keenan Stauffer

## 2024-04-05 NOTE — PRE SEDATION
Sedation Pre-Procedure Note    Patient Name: Terell Martinez   YOB: 1945  Room/Bed: [address] [place], [place] [address]-A  Medical Record Number: [id number]  Date: 4/5/2024   Time: 9:58 AM       Indication:  Image guided right lower lobe lung mass biopsy with possible chest tube insertion and possible conscious sedation.     Consent: IDr. Fatima have discussed with the patient and/or the patient representative the indication, alternatives, and the possible risks and/or complications of the planned procedure and the anesthesia methods. The patient and/or patient representative appear to understand and agree to proceed.    Vital Signs:   Vitals:    04/05/24 0640   BP: (!) 120/57   Pulse: 59   Resp: 16   Temp: 98.5 °F (36.9 °C)   SpO2: 95%       Past Medical History:   has no past medical history on file.    Past Surgical History:   has no past surgical history on file.    Medications:   Scheduled Meds:    tamsulosin  0.4 mg Oral Daily    sodium chloride flush  5-40 mL IntraVENous 2 times per day    [Held by provider] enoxaparin  40 mg SubCUTAneous Daily     Continuous Infusions:    sodium chloride 100 mL/hr at 04/05/24 0503    sodium chloride       PRN Meds: ketorolac, sodium chloride flush, sodium chloride, potassium chloride **OR** potassium alternative oral replacement **OR** potassium chloride, magnesium sulfate, ondansetron **OR** ondansetron, polyethylene glycol, acetaminophen **OR** acetaminophen  Home Meds:   Prior to Admission medications    Medication Sig Start Date End Date Taking? Authorizing Provider   atorvastatin (LIPITOR) 10 MG tablet Take 0.5 tablets by mouth daily   Yes Kahlil Gabriel MD   vitamin D (VITAMIN D3) 50 MCG (2000 UT) CAPS capsule Take 1 capsule by mouth daily   Yes Kahlil Gabriel MD   aspirin 81 MG chewable tablet Take 1 tablet by mouth daily   Yes Kahlil Gabriel MD     Coumadin Use Last 7 Days:  no  Antiplatelet drug therapy use last 7 days: yes - ASA 4/2/24  Other anticoagulant use

## 2024-04-05 NOTE — OR NURSING
Patient arrived from the floor (room 539) to cat scan for image guided right lower lobe lung mass biopsy. Dr. Fatima in to speak to the patient prior to the procedure, all questions/concerns addressed. Consent signed per patient. IV started in the RAC flushed with good blood return (LAC IV site leaking, removed). Patient connected to monitoring equipment, vitals checked pre procedure. Patient positioned prone on cat scan table. Patient prepped and draped. Patient medicated with Fentanyl and Versed per order (see MAR). Lidocaine administered to procedural site. Needle inserted to right lower lobe lung mass, biopsies x 4 cores collected. Images taken and reviewed by Dr. Fatima. Needle removed, site cleansed, and dry/sterile dressing applied. Vitals checked post procedure. Patient tolerated procedure well. Procedure completed @ 1103. CXR ordered post procedure at 1300. Patient transported back to his room on the 5th floor. Biopsy specimen taken to lab, post procedures orders placed.

## 2024-04-05 NOTE — CONSULTS
[facility]  [address].  [place] [id number]  [phone]  FAX [phone]        REASON FOR CONSULTATION:      4 mm right mid ureteral stone    HISTORY OF PRESENT ILLNESS:      The patient is a 78 y.o. male patient who presents with severe right-sided flank pain.  He was found to have a 4 mm right proximal ureteral stone.  He has no fevers or chills.  He was also found to have a lung lesion and underwent biopsy today of this lung lesion.    His flank pain has resolved and he is interested in discharge.  He has not passed a stone to his knowledge.      History reviewed. No pertinent past medical history.      Past Surgical History:   Procedure Laterality Date    CT NEEDLE BIOPSY LUNG PERCUTANEOUS  4/5/2024    CT NEEDLE BIOPSY LUNG PERCUTANEOUS 4/5/2024 [facility] CT       Medications Prior to Admission:    Medications Prior to Admission: atorvastatin (LIPITOR) 10 MG tablet, Take 0.5 tablets by mouth daily  vitamin D (VITAMIN D3) 50 MCG (2000 UT) CAPS capsule, Take 1 capsule by mouth daily  aspirin 81 MG chewable tablet, Take 1 tablet by mouth daily    Allergies:    Patient has no known allergies.    Social History:    reports that he has been smoking cigarettes. He started smoking about 64 years ago. He has a 63.1 pack-year smoking history. He does not have any smokeless tobacco history on file.    Family History:   Non-contributory to this Urological problem  family history is not on file.    REVIEW OF SYSTEMS:  Respiratory: negative for cough and hemoptysis  Cardiovascular: negative for chest pain and dyspnea  Gastrointestinal: negative for abdominal pain, diarrhea, nausea and vomiting  Derm: negative for rash and skin lesion(s)  Neurological: negative for seizures and tremors  Endocrine: negative for diabetic symptoms including polydipsia and polyuria  : As above in the HPI, otherwise negative  All other systems negative    PHYSICAL EXAM:    Vitals:  BP (!) 161/72   Pulse 62

## 2024-04-05 NOTE — DISCHARGE SUMMARY
calculi are identified.  No left renal calculi, hydronephrosis or hydroureter is identified.  There is cholelithiasis, without evidence of acute cholecystitis.  Prostate gland is mildly enlarged. There is no evidence of free intraperitoneal fluid or air.  There is scattered colonic diverticulosis, without evidence of diverticulitis.  There is no evidence of large or small bowel obstruction.  The vermiform appendix is nondilated.  No definite anterior abdominal wall hernia is identified. Bone windows reveal moderate right hip joint degenerative changes.  Pedicle screw fixation is present bilaterally at L4 and L5 with intervertebral disc spacer at L4-5.  Moderate to severe degenerative changes are present at the L1-2, L2-3, L3-4 and L5-S1 levels treated there is no evidence of acute fracture or definite osteolytic/osteoblastic lesion.     Obstructing 4 mm mid right ureteral calculus. Cholelithiasis, without evidence of acute cholecystitis. Right pleural effusion with multiple right inferior pleural nodules, pulmonary nodules and a 6 cm subpleural solid density in the right lower lobe are again identified and worrisome for a neoplastic process. Prostatomegaly.     CT Lung Screen (Initial/Annual)    Result Date: 3/30/2024  EXAMINATION: LOW DOSE SCREENING CT OF THE CHEST WITHOUT CONTRAST 3/29/2024 5:58 pm TECHNIQUE: Low dose lung cancer screening CT of the chest was performed without the administration of intravenous contrast.  Multiplanar reformatted images are provided for review. Automated exposure control, iterative reconstruction, and/or weight based adjustment of the mA/kV was utilized to reduce the radiation dose to as low as reasonably achievable. COMPARISON: 03/08/2023 HISTORY: ORDERING SYSTEM PROVIDED HISTORY: Current smoker TECHNOLOGIST PROVIDED HISTORY: Age: 78 y.o. Smoking History: Social History Tobacco Use Smoking status: Every Day Packs/day: 1.00 Years: 1 pack/day for 64.2 years (64.2 ttl pk-yrs) Types:  physician as directed in discharge paperwork.  Please review results of imaging studies with PCP.  Follow-up with consultants as directed by them.  If recurrence or worsening of symptoms go to the ED or call primary care physician.  Diet: ADULT DIET; Regular    FOLLOW-IP  Awadalla, Maged I, MD  [address]  [place] [address]  [phone]    Follow up in 1 week(s)  for follow up appointment    Warner Clark MD  [address] [facility]  [address] [place] [address]  [phone]    Call  for follow up appointment    Edilberto Kumar MD  [address] [place] [address]  [phone]    Call  for follow up appointment      Preparing for this patient's discharge, including paperwork, orders, instructions, and meeting with patient did require greater than 35 minutes.    Electronically signed by JANIE Dutton CNP on 4/5/2024 at 2:56 PM

## 2024-04-09 LAB — SURGICAL PATHOLOGY REPORT: NORMAL

## 2024-04-11 ENCOUNTER — TELEPHONE (OUTPATIENT)
Dept: CASE MANAGEMENT | Age: 79
End: 2024-04-11

## 2024-04-11 NOTE — TELEPHONE ENCOUNTER
No call, encounter opened to process CT Lung Screening.    CT Lung Screen: 3/30/2024    IMPRESSION:  1. Markedly abnormal irregular pleural thickening seen throughout the right  lung extending into the minor and major fissures.  The irregular pleural  thickening is most prominent within the right lung base medially and measure  up to 4.0 cm in maximum with and extends along the medial pleural surface  into the right infrahilar region.  Malignancy is suspected.  PET-CT scan is  recommended followed by CT-guided biopsy.  2. Multiple nodule seen within the right lung more prominent within the right  lower lobe.  The largest measures 5.5 mm.  3. Shotty lymphadenopathy within the right paratracheal region.  4. The left lung is clear..    LUNG RADS:  Lung-RADS 4B - Very Suspicious (v2022)     Management:; PET/CT may be considered if there is a >= 8 mm solid nodule or  solid component; tissue sampling; and/or referral for further clinical  evaluation.  Management depends on clinical evaluation, patient preference,  and the probability of malignancy.    RECOMMENDATIONS:  If you would like to register your patient with the [facility] Lung Nodule/Lung  Cancer Screening Program, please contact the Nurse Navigator at  [phone].    Pack years: 63.1    Social History     Tobacco Use  Smoking Status: Current Every Day Smoker    Start Date: 1960   Quit Date: 2022   Types: Cigarettes   Packs/Day: 1   Years: 62   Pack Years: 63.1   Smokeless Tobacco:         Results letter sent to patient via my chart or mailed.     Harrison Taylor  Imaging Navigator   [facility]   [phone]

## 2024-04-18 PROBLEM — C34.91 ADENOCARCINOMA, LUNG, RIGHT (HCC): Status: ACTIVE | Noted: 2024-04-18

## 2024-04-19 ENCOUNTER — HOSPITAL ENCOUNTER (OUTPATIENT)
Dept: PET IMAGING | Age: 79
End: 2024-04-19
Attending: INTERNAL MEDICINE
Payer: MEDICARE

## 2024-04-19 DIAGNOSIS — R91.8 PULMONARY NODULES: ICD-10-CM

## 2024-04-19 LAB — GLUCOSE BLD-MCNC: 83 MG/DL (ref 74–99)

## 2024-04-19 PROCEDURE — A9552 F18 FDG: HCPCS | Performed by: RADIOLOGY

## 2024-04-19 PROCEDURE — 78815 PET IMAGE W/CT SKULL-THIGH: CPT

## 2024-04-19 PROCEDURE — A9609 HC RX DIAGNOSTIC RADIOPHARMACEUTICAL: HCPCS | Performed by: RADIOLOGY

## 2024-04-19 PROCEDURE — 3430000000 HC RX DIAGNOSTIC RADIOPHARMACEUTICAL: Performed by: RADIOLOGY

## 2024-04-19 PROCEDURE — 82962 GLUCOSE BLOOD TEST: CPT

## 2024-04-19 RX ORDER — FLUDEOXYGLUCOSE F 18 200 MCI/ML
15 INJECTION, SOLUTION INTRAVENOUS
Status: COMPLETED | OUTPATIENT
Start: 2024-04-19 | End: 2024-04-19

## 2024-04-19 RX ADMIN — FLUDEOXYGLUCOSE F 18 15 MILLICURIE: 200 INJECTION, SOLUTION INTRAVENOUS at 11:15

## 2024-04-22 LAB — SURGICAL PATHOLOGY REPORT: NORMAL

## 2024-07-05 ENCOUNTER — TELEPHONE (OUTPATIENT)
Dept: PALLATIVE CARE | Age: 79
End: 2024-07-05

## 2024-07-05 NOTE — TELEPHONE ENCOUNTER
Placed call to pt regarding [facility], no answer left message with contact information to return call.      Abbi Zayas MSW,LSW  Palliative Medicine

## 2024-07-10 ENCOUNTER — TELEPHONE (OUTPATIENT)
Dept: PALLATIVE CARE | Age: 79
End: 2024-07-10

## 2024-07-10 NOTE — TELEPHONE ENCOUNTER
Called and spoke with Terell regarding referral for Palliative Care from his [facility] provider. Explained Palliative service. Jeanette set 7/23/24. Called to Community Outreach and left message for Melinda at ext [phone] that patient is scheduled.

## 2024-07-19 ENCOUNTER — APPOINTMENT (OUTPATIENT)
Dept: GENERAL RADIOLOGY | Age: 79
End: 2024-07-19
Payer: OTHER GOVERNMENT

## 2024-07-19 ENCOUNTER — HOSPITAL ENCOUNTER (EMERGENCY)
Age: 79
Discharge: HOME | End: 2024-07-19
Attending: EMERGENCY MEDICINE
Payer: OTHER GOVERNMENT

## 2024-07-19 VITALS
BODY MASS INDEX: 26.84 KG/M2 | TEMPERATURE: 97.5 F | SYSTOLIC BLOOD PRESSURE: 127 MMHG | OXYGEN SATURATION: 98 % | RESPIRATION RATE: 17 BRPM | WEIGHT: 167 LBS | HEIGHT: 66 IN | HEART RATE: 71 BPM | DIASTOLIC BLOOD PRESSURE: 81 MMHG

## 2024-07-19 DIAGNOSIS — C34.90 MALIGNANT NEOPLASM OF LUNG, UNSPECIFIED LATERALITY, UNSPECIFIED PART OF LUNG (HCC): ICD-10-CM

## 2024-07-19 DIAGNOSIS — U07.1 COVID-19: Primary | ICD-10-CM

## 2024-07-19 LAB
ALBUMIN SERPL-MCNC: 4 G/DL (ref 3.5–5.2)
ALP SERPL-CCNC: 71 U/L (ref 40–129)
ALT SERPL-CCNC: 23 U/L (ref 0–40)
ANION GAP SERPL CALCULATED.3IONS-SCNC: 10 MMOL/L (ref 7–16)
AST SERPL-CCNC: 24 U/L (ref 0–39)
ATYPICAL LYMPHOCYTE ABSOLUTE COUNT: 0.05 K/UL (ref 0–0.46)
ATYPICAL LYMPHOCYTES: 2 % (ref 0–4)
B PARAP IS1001 DNA NPH QL NAA+NON-PROBE: NOT DETECTED
B PERT DNA SPEC QL NAA+PROBE: NOT DETECTED
BASOPHILS # BLD: 0.03 K/UL (ref 0–0.2)
BASOPHILS NFR BLD: 1 % (ref 0–2)
BILIRUB SERPL-MCNC: 0.7 MG/DL (ref 0–1.2)
BUN SERPL-MCNC: 22 MG/DL (ref 6–23)
C PNEUM DNA NPH QL NAA+NON-PROBE: NOT DETECTED
CALCIUM SERPL-MCNC: 9 MG/DL (ref 8.6–10.2)
CHLORIDE SERPL-SCNC: 98 MMOL/L (ref 98–107)
CO2 SERPL-SCNC: 27 MMOL/L (ref 22–29)
CREAT SERPL-MCNC: 0.9 MG/DL (ref 0.7–1.2)
EOSINOPHIL # BLD: 0.03 K/UL (ref 0.05–0.5)
EOSINOPHILS RELATIVE PERCENT: 1 % (ref 0–6)
ERYTHROCYTE [DISTWIDTH] IN BLOOD BY AUTOMATED COUNT: 16.3 % (ref 11.5–15)
FLUAV RNA NPH QL NAA+NON-PROBE: NOT DETECTED
FLUBV RNA NPH QL NAA+NON-PROBE: NOT DETECTED
GFR, ESTIMATED: 87 ML/MIN/1.73M2
GLUCOSE SERPL-MCNC: 91 MG/DL (ref 74–99)
HADV DNA NPH QL NAA+NON-PROBE: NOT DETECTED
HCOV 229E RNA NPH QL NAA+NON-PROBE: NOT DETECTED
HCOV HKU1 RNA NPH QL NAA+NON-PROBE: NOT DETECTED
HCOV NL63 RNA NPH QL NAA+NON-PROBE: NOT DETECTED
HCOV OC43 RNA NPH QL NAA+NON-PROBE: NOT DETECTED
HCT VFR BLD AUTO: 33.3 % (ref 37–54)
HGB BLD-MCNC: 11.2 G/DL (ref 12.5–16.5)
HMPV RNA NPH QL NAA+NON-PROBE: NOT DETECTED
HPIV1 RNA NPH QL NAA+NON-PROBE: NOT DETECTED
HPIV2 RNA NPH QL NAA+NON-PROBE: NOT DETECTED
HPIV3 RNA NPH QL NAA+NON-PROBE: NOT DETECTED
HPIV4 RNA NPH QL NAA+NON-PROBE: NOT DETECTED
INFLUENZA A BY PCR: NOT DETECTED
INFLUENZA B BY PCR: NOT DETECTED
LACTATE BLDV-SCNC: 1 MMOL/L (ref 0.5–1.9)
LYMPHOCYTES NFR BLD: 0.67 K/UL (ref 1.5–4)
LYMPHOCYTES RELATIVE PERCENT: 23 % (ref 20–42)
M PNEUMO DNA NPH QL NAA+NON-PROBE: NOT DETECTED
MCH RBC QN AUTO: 31.5 PG (ref 26–35)
MCHC RBC AUTO-ENTMCNC: 33.6 G/DL (ref 32–34.5)
MCV RBC AUTO: 93.5 FL (ref 80–99.9)
MONOCYTES NFR BLD: 0.38 K/UL (ref 0.1–0.95)
MONOCYTES NFR BLD: 13 % (ref 2–12)
NEUTROPHILS NFR BLD: 60 % (ref 43–80)
NEUTS SEG NFR BLD: 1.75 K/UL (ref 1.8–7.3)
PLATELET # BLD AUTO: 113 K/UL (ref 130–450)
PMV BLD AUTO: 9.2 FL (ref 7–12)
POTASSIUM SERPL-SCNC: 4.1 MMOL/L (ref 3.5–5)
PROT SERPL-MCNC: 7 G/DL (ref 6.4–8.3)
RBC # BLD AUTO: 3.56 M/UL (ref 3.8–5.8)
RBC # BLD: ABNORMAL 10*6/UL
RBC # BLD: ABNORMAL 10*6/UL
RSV BY PCR: NOT DETECTED
RSV RNA NPH QL NAA+NON-PROBE: NOT DETECTED
RV+EV RNA NPH QL NAA+NON-PROBE: NOT DETECTED
SARS-COV-2 RDRP RESP QL NAA+PROBE: DETECTED
SARS-COV-2 RNA NPH QL NAA+NON-PROBE: DETECTED
SODIUM SERPL-SCNC: 135 MMOL/L (ref 132–146)
SPECIMEN DESCRIPTION: ABNORMAL
SPECIMEN DESCRIPTION: ABNORMAL
SPECIMEN SOURCE: NORMAL
WBC OTHER # BLD: 2.9 K/UL (ref 4.5–11.5)

## 2024-07-19 PROCEDURE — 87635 SARS-COV-2 COVID-19 AMP PRB: CPT

## 2024-07-19 PROCEDURE — 0202U NFCT DS 22 TRGT SARS-COV-2: CPT

## 2024-07-19 PROCEDURE — 80053 COMPREHEN METABOLIC PANEL: CPT

## 2024-07-19 PROCEDURE — 85025 COMPLETE CBC W/AUTO DIFF WBC: CPT

## 2024-07-19 PROCEDURE — 87502 INFLUENZA DNA AMP PROBE: CPT

## 2024-07-19 PROCEDURE — 87040 BLOOD CULTURE FOR BACTERIA: CPT

## 2024-07-19 PROCEDURE — 87634 RSV DNA/RNA AMP PROBE: CPT

## 2024-07-19 PROCEDURE — 83605 ASSAY OF LACTIC ACID: CPT

## 2024-07-19 PROCEDURE — 99284 EMERGENCY DEPT VISIT MOD MDM: CPT

## 2024-07-19 PROCEDURE — 71045 X-RAY EXAM CHEST 1 VIEW: CPT

## 2024-07-19 RX ORDER — AZITHROMYCIN 250 MG/1
TABLET, FILM COATED ORAL
Qty: 6 TABLET | Refills: 0 | Status: SHIPPED | OUTPATIENT
Start: 2024-07-19 | End: 2024-07-29

## 2024-07-19 ASSESSMENT — LIFESTYLE VARIABLES
HOW MANY STANDARD DRINKS CONTAINING ALCOHOL DO YOU HAVE ON A TYPICAL DAY: PATIENT DOES NOT DRINK
HOW OFTEN DO YOU HAVE A DRINK CONTAINING ALCOHOL: NEVER

## 2024-07-19 ASSESSMENT — PAIN - FUNCTIONAL ASSESSMENT
PAIN_FUNCTIONAL_ASSESSMENT: NONE - DENIES PAIN
PAIN_FUNCTIONAL_ASSESSMENT: NONE - DENIES PAIN

## 2024-07-19 NOTE — ED PROVIDER NOTES
available at the time of this note:    XR CHEST PORTABLE   Final Result   No acute process.             PROCEDURES   Unless otherwise noted below, none      Medical Decision Making/Differential Diagnosis:   CC/HPI Summary, DDx, ED Course, Reassessment, Tests Considered, Patient expectation:   78 y.o. male who presents to the emergency department with chief complaint of fever.  Patient resting comfortably and hemodynamically stable and does not want anything for pain.  Patient is afebrile.  Patient has not had any fevers in the emergency department during his stay.  Mild leukopenia of 2.9 and no acute anemia.  CMP without electrolyte derangements, kidney injury, LFT changes.  Lactic acid is normal.  Influenza and COVID-19 tested and patient is negative for influenza however is positive for COVID-19 infection.  Negative for RSV.  Molecular panel was sent and pending.  Cultures drawn and pending.  Patient is not having any dysuria and so initially plan for urinalysis however patient refuses and this is felt be reasonable given absence of other symptoms and no fever.  Chest x-ray is normal.  Reassessed patient who is resting comfortably.  Patient is to follow-up with his hematologist as well as his family doctor and is given prescription for azithromycin for inflammation secondary to COVID-19.    Extensively reviewed laboratory and imaging findings with patient/family members/available representative parties and all questions answered at this time to the fullest extent possible.  Patient has been closely monitored with multiple reevaluations and has remained hemodynamically stable.  They have clinically improved and through shared decision-making, patient is to be discharged to home. Patient is understanding and agreeable with this.  They have been instructed to follow-up with family physician and hematologist/oncologist as soon as possible and are provided with strict return precautions as to which they should return  to the nearest available emergency department.  Patient acknowledges understanding of all of these instructions and is to be discharged at this time.    Differential diagnosis includes but is not limited to: Neutropenic fever, viral upper respiratory infection, pneumonia, pneumothorax, acute cystitis    Please refer to the ED Course as available for additional MDM.        Social Determinants affecting Dx or Tx: Patient has good medical compliance and fluency as well as established follow-up with family physician.    Records Reviewed: On attempted record review: No significant external or nonemergency department records available at this time.    I am the Primary Clinician of Record.    CONSULTS: (Who and What was discussed)  IP CONSULT TO HEMATOLOGY    FINAL IMPRESSION      1. COVID-19          DISPOSITION/PLAN   DISPOSITION Decision To Discharge 07/19/2024 09:32:03 PM    PATIENT REFERRED TO:  Awadalla, Maged I, MD  [address] [facility] [facility] [address]  [place] [address]  [phone]    Schedule an appointment as soon as possible for a visit       [facility] Emergency Department  [address] [place] [address]  [phone]  Go to   If symptoms worsen      DISCHARGE MEDICATIONS:  Discharge Medication List as of 7/19/2024  9:32 PM        START taking these medications    Details   azithromycin (ZITHROMAX) 250 MG tablet 500mg on day 1 followed by 250mg on days 2 - 5, Disp-6 tablet, R-0Normal                  (Please note that portions of this note were completed with a voice recognition program.  Efforts were made to edit the dictations but occasionally words are mis-transcribed.)    Keenan Liu DO (electronically signed)

## 2024-07-20 NOTE — DISCHARGE INSTRUCTIONS
Please call and follow up with your family physician as well as your hematology oncologist.  Return to emergency department if you develop fevers.  Take Z-Eugene for inflammation as discussed.

## 2024-07-20 NOTE — ED NOTES
Ambulated patient in grissom with continuous SPO2 monitoring. At rest, patient SPO2 97% on RA, HR: 81 bpm. During ambulation, patient SPO2 97-98% on RA and HR: 86 bpm. Patient did not display any signs of being unsteady during ambulation, patient denied SOB and/or dizziness.

## 2024-07-20 NOTE — ED NOTES
Educated patient on need for urine sample for urinalysis. Patient stated \"not going to happen\". Provider notified.

## 2024-07-21 LAB
MICROORGANISM SPEC CULT: NORMAL
MICROORGANISM SPEC CULT: NORMAL
SERVICE CMNT-IMP: NORMAL
SERVICE CMNT-IMP: NORMAL
SPECIMEN DESCRIPTION: NORMAL
SPECIMEN DESCRIPTION: NORMAL

## 2024-07-22 ENCOUNTER — TELEPHONE (OUTPATIENT)
Dept: PALLATIVE CARE | Age: 79
End: 2024-07-22

## 2024-07-22 NOTE — TELEPHONE ENCOUNTER
Went to confirm appointment for palliative care for 7/23/2024 and patient is positive covid 19. Called patient and rescheduled new patient visit for Tuesday 8/6/2024 at 1500

## 2024-08-22 ENCOUNTER — TELEPHONE (OUTPATIENT)
Dept: CASE MANAGEMENT | Age: 79
End: 2024-08-22

## 2024-08-22 ENCOUNTER — HOSPITAL ENCOUNTER (OUTPATIENT)
Dept: RADIATION ONCOLOGY | Age: 79
Discharge: HOME | End: 2024-08-22
Payer: OTHER GOVERNMENT

## 2024-08-22 VITALS
HEART RATE: 91 BPM | WEIGHT: 179.6 LBS | RESPIRATION RATE: 18 BRPM | DIASTOLIC BLOOD PRESSURE: 77 MMHG | SYSTOLIC BLOOD PRESSURE: 148 MMHG | TEMPERATURE: 97.5 F | OXYGEN SATURATION: 98 % | BODY MASS INDEX: 29.43 KG/M2

## 2024-08-22 DIAGNOSIS — C79.31 METASTASIS TO BRAIN (HCC): Primary | ICD-10-CM

## 2024-08-22 PROCEDURE — 99205 OFFICE O/P NEW HI 60 MIN: CPT

## 2024-08-22 RX ORDER — FOLIC ACID 1 MG/1
1 TABLET ORAL DAILY
COMMUNITY

## 2024-08-22 RX ORDER — GABAPENTIN 100 MG/1
100 CAPSULE ORAL 3 TIMES DAILY
COMMUNITY

## 2024-08-22 NOTE — PROGRESS NOTES
Radiation Oncology Consult Note         8/22/2024    Terell Martinez  78 y.o.   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      HISTORY OF PRESENT ILLNESS: Mr. Terell Martinez  is a 78 y.o. year old male who was hospitalized in early March April 2024 with kidney stones.  On the CT a right lower lobe mass was appreciated.  This prompted a more extensive workup including a PET/CT on 4/19/2024 which showed extensive malignancy throughout the chest, right lower lobe as well as right pleural diseases including bony metastasis at T9.  The patient commence a course of chemoimmunotherapy consisting of carboplatinum, Alimta and Keytruda.  As a part of his workup an MRI of the brain in May and again in August demonstrated a left frontal lesion consistent with a metastasis.  The patient has apparently had radiation by his recollection to his left Chuck neck.  However these records are unavailable at the time of consultation today.  Will make attempts to retrieve the prior to finalization of treatment.    We been asked to see the patient today in consultation regarding the management of his intracranial disease.    PAST MEDICAL HISTORY:      Diagnosis Date    Arthritis        PAST SURGICAL HISTORY:      Procedure Laterality Date    CT NEEDLE BIOPSY LUNG PERCUTANEOUS  4/5/2024    CT NEEDLE BIOPSY LUNG PERCUTANEOUS 4/5/2024 [facility] CT       No Known Allergies    MEDICATIONS:  Medications reviewed and reconciled.  Current Outpatient Medications   Medication Sig Dispense Refill    folic acid (FOLVITE) 1 MG tablet Take 1 tablet by mouth daily      gabapentin (NEURONTIN) 100 MG capsule Take 1 capsule by mouth 3 times daily.      tamsulosin (FLOMAX) 0.4 MG capsule Take 1 capsule by

## 2024-08-22 NOTE — TELEPHONE ENCOUNTER
ELECTROENCEPHALOGRAM REPORT     Patient Name: Anton May  : 1971  Age: 47 y.o. Ordering physician: Tad Gutierrez NP  Date of EEG: 2020  Interpreting physician: Tomas Ramirez MD    PROCEDURE: Routine video electroencephalogram (vEEG)       CLINICAL INDICATION: The patient is a 47 y.o. female who is being evaluated for subclinical seizures. DESCRIPTION OF THE RECORD:     Throughout this epoch, there is a variety of artifacts which obscure interpretation. Within these confines, there is attenuated (10-20 uV) polymorphic theta noted posteriorly with apparent suppression in bifrontal regions, though this maybe artifactual in nature. No state changes, epileptiform discharges or electrographic seizures are noted. Single-lead ECG demonstrates normal sinus rhythm. INTERPRETATION:     This is a technically limited study which renders interpretation difficult. Within these confines, no evidence for seizures or ongoing epileptiform discharges are noted.     Eloise Del Toro MD Met with patient during his initial consultation with Dr Mayfield for his metastatic Lung cancer diagnosis. Introduced myself and explained Nurse Navigator role with patients receiving treatment at our center. He follows with Dr Song for Medical Oncology and was referred to our office to discuss SRS Radiation Therapy treatments. Patient was friendly and receptive. He denies any questions or needs at this time. Next steps include MRI, CT simulaiton, SRS planning and treatments pending Dr Mayfield's recommendations and follow up care. Provided patient with literature  [facility], [facility] Resources and [facility] Radiation for Metastatic disease to the Brain. Reviewed resources available including Social Work and Dietician. Provided with my contact information and encouraged patient to call me with questions or concerns. Verbalizes understanding. Patient appreciative of visit. Will continue to follow.

## 2024-08-22 NOTE — PROGRESS NOTES
Terell Martinez  1945 78 y.o.      Referring Physician: Dr. Song    PCP: Awadalla, Maged I, MD     Vitals:    08/22/24 1310   BP: (!) 148/77   Pulse: 91   Resp: 18   Temp: 97.5 °F (36.4 °C)   SpO2: 98%        Wt Readings from Last 3 Encounters:   08/22/24 81.5 kg (179 lb 9.6 oz)   07/19/24 75.8 kg (167 lb)   04/18/24 75.8 kg (167 lb)        Body mass index is 29.43 kg/m².               Chief Complaint: No chief complaint on file.         Cancer Staging   No matching staging information was found for the patient.      Prior Radiation Therapy? NO    Concurrent Chemo/radiation? NO    Prior Chemotherapy? NO    Prior Hormonal Therapy? NO    Head and Neck Cancer? No, patient does NOT have HN cancer.              Current Outpatient Medications   Medication Sig Dispense Refill    folic acid (FOLVITE) 1 MG tablet Take 1 tablet by mouth daily      gabapentin (NEURONTIN) 100 MG capsule Take 1 capsule by mouth 3 times daily.      tamsulosin (FLOMAX) 0.4 MG capsule Take 1 capsule by mouth daily (Patient not taking: Reported on 8/22/2024) 30 capsule 0    atorvastatin (LIPITOR) 10 MG tablet Take 0.5 tablets by mouth daily (Patient not taking: Reported on 8/22/2024)      vitamin D (VITAMIN D3) 50 MCG (2000 UT) CAPS capsule Take 1 capsule by mouth daily      aspirin 81 MG chewable tablet Take 1 tablet by mouth daily (Patient not taking: Reported on 8/22/2024)       No current facility-administered medications for this encounter.       Past Medical History:   Diagnosis Date    Arthritis        Past Surgical History:   Procedure Laterality Date    CT NEEDLE BIOPSY LUNG PERCUTANEOUS  4/5/2024    CT NEEDLE BIOPSY LUNG PERCUTANEOUS 4/5/2024 [facility] CT       Family History   Problem Relation Age of Onset    Cancer Maternal Grandmother     Cancer Paternal Grandfather        Social History     Socioeconomic History    Marital status:      Spouse name: Not on file    Number of children: Not on file    Years of education: Not on file

## 2024-08-26 ENCOUNTER — TELEPHONE (OUTPATIENT)
Dept: CASE MANAGEMENT | Age: 79
End: 2024-08-26

## 2024-08-26 NOTE — TELEPHONE ENCOUNTER
Updated that current [facility] Authorization for Radiation does not include patient's ordered MRI for SRS planning. RFS form completed, signed and faxed to [facility] to Melinda for urgent authorization as MRI is scheduled for 8/28.

## 2024-08-28 ENCOUNTER — HOSPITAL ENCOUNTER (OUTPATIENT)
Dept: MRI IMAGING | Age: 79
Discharge: HOME | End: 2024-08-30
Payer: OTHER GOVERNMENT

## 2024-08-28 DIAGNOSIS — C79.31 METASTASIS TO BRAIN (HCC): ICD-10-CM

## 2024-08-28 PROCEDURE — 70552 MRI BRAIN STEM W/DYE: CPT

## 2024-08-28 PROCEDURE — 6360000004 HC RX CONTRAST MEDICATION: Performed by: RADIOLOGY

## 2024-08-28 PROCEDURE — A9577 INJ MULTIHANCE: HCPCS | Performed by: RADIOLOGY

## 2024-08-28 RX ADMIN — GADOBENATE DIMEGLUMINE 30 ML: 529 INJECTION, SOLUTION INTRAVENOUS at 10:34

## 2024-08-29 ENCOUNTER — HOSPITAL ENCOUNTER (OUTPATIENT)
Dept: RADIATION ONCOLOGY | Age: 79
Discharge: HOME | End: 2024-08-29
Payer: OTHER GOVERNMENT

## 2024-08-29 PROCEDURE — 77334 RADIATION TREATMENT AID(S): CPT | Performed by: SPECIALIST

## 2024-08-29 PROCEDURE — 6360000004 HC RX CONTRAST MEDICATION: Performed by: SPECIALIST

## 2024-08-29 RX ORDER — IOPAMIDOL 755 MG/ML
100 INJECTION, SOLUTION INTRAVASCULAR
Status: COMPLETED | OUTPATIENT
Start: 2024-08-29 | End: 2024-08-29

## 2024-08-29 RX ADMIN — IOPAMIDOL 100 ML: 755 INJECTION, SOLUTION INTRAVENOUS at 10:27

## 2024-09-03 PROCEDURE — 77338 DESIGN MLC DEVICE FOR IMRT: CPT | Performed by: SPECIALIST

## 2024-09-03 PROCEDURE — 77300 RADIATION THERAPY DOSE PLAN: CPT | Performed by: SPECIALIST

## 2024-09-03 PROCEDURE — 77301 RADIOTHERAPY DOSE PLAN IMRT: CPT | Performed by: SPECIALIST

## 2024-09-04 ENCOUNTER — HOSPITAL ENCOUNTER (OUTPATIENT)
Dept: RADIATION ONCOLOGY | Age: 79
End: 2024-09-04
Payer: OTHER GOVERNMENT

## 2024-09-10 ENCOUNTER — HOSPITAL ENCOUNTER (OUTPATIENT)
Dept: RADIATION ONCOLOGY | Age: 79
Discharge: HOME | End: 2024-09-10
Payer: OTHER GOVERNMENT

## 2024-09-10 PROCEDURE — 77372 SRS LINEAR BASED: CPT | Performed by: SPECIALIST

## 2024-09-10 PROCEDURE — 77336 RADIATION PHYSICS CONSULT: CPT | Performed by: SPECIALIST

## 2024-10-09 ENCOUNTER — HOSPITAL ENCOUNTER (OUTPATIENT)
Dept: RADIATION ONCOLOGY | Age: 79
Discharge: HOME | End: 2024-10-09

## 2024-10-09 VITALS
OXYGEN SATURATION: 97 % | WEIGHT: 178.1 LBS | HEART RATE: 87 BPM | TEMPERATURE: 97.6 F | RESPIRATION RATE: 18 BRPM | BODY MASS INDEX: 29.19 KG/M2 | DIASTOLIC BLOOD PRESSURE: 74 MMHG | SYSTOLIC BLOOD PRESSURE: 146 MMHG

## 2024-10-09 DIAGNOSIS — C79.31 METASTASIS TO BRAIN (HCC): Primary | ICD-10-CM

## 2024-10-09 DIAGNOSIS — Z08 ENCOUNTER FOR FOLLOW-UP SURVEILLANCE OF BRAIN CANCER: ICD-10-CM

## 2024-10-09 DIAGNOSIS — Z08 ENCOUNTER FOR FOLLOW-UP EXAMINATION AFTER COMPLETED TREATMENT FOR MALIGNANT NEOPLASM: Primary | ICD-10-CM

## 2024-10-09 DIAGNOSIS — Z85.841 ENCOUNTER FOR FOLLOW-UP SURVEILLANCE OF BRAIN CANCER: ICD-10-CM

## 2024-10-09 PROCEDURE — 99024 POSTOP FOLLOW-UP VISIT: CPT | Performed by: NURSE PRACTITIONER

## 2024-10-09 RX ORDER — ASCORBIC ACID 100 MG
1 TABLET,CHEWABLE ORAL DAILY
COMMUNITY

## 2024-10-09 ASSESSMENT — PAIN SCALES - GENERAL: PAINLEVEL_OUTOF10: 8

## 2024-10-09 NOTE — PROGRESS NOTES
RADIATION ONCOLOGY- [facility]   4 week follow up       10/09/2024       NAME:  Terell Martinez    YOB: 1945    Diagnosis: Secondary cancer of the brain.     Subjective:  Terell Martinez completed SRS 2400 cGy/ 1 fraction to the left anterior frontal metastasis.     The patient is seen today in 4 week post SRS completion symptom check. The patient reports 2 days after completing SRS having extreme fatigue that lasted about 4 weeks then resolved. The patient also reports decreased appetite and weight loss during that time.     The patient denies headaches, seizure activity or dizziness. Reports unsteady gait with positional changes, no falls. No memory loss or confusion. No vision or hearing changes. No fevers or chills. No nausea, vomiting or diarrhea     The patient is currently on maintenance therapy Alimta and Keytruda as directed per Medical Oncology.       Patient is following with:    Medical Oncology- Dr. Song     Pain: Chronic low back pain controlled taking Gabapentin as Rx  per a different provider.     Past medical, surgical, social and family histories reviewed and updated as indicated.    ALLERGIES:  Patient has no known allergies.         Current Outpatient Medications   Medication Sig Dispense Refill    Ascorbic Acid (VITAMIN C) 100 MG CHEW Take 1 tablet by mouth daily      Calcium Carb-Cholecalciferol 600-12.5 MG-MCG CAPS Take 1 capsule by mouth daily      folic acid (FOLVITE) 1 MG tablet Take 1 tablet by mouth daily      gabapentin (NEURONTIN) 100 MG capsule Take 1 capsule by mouth 3 times daily.      atorvastatin (LIPITOR) 10 MG tablet Take 0.5 tablets by mouth daily      vitamin D (VITAMIN D3) 50 MCG (2000 UT) CAPS capsule Take 1 capsule by mouth daily      tamsulosin (FLOMAX) 0.4 MG capsule Take 1 capsule by mouth daily (Patient not taking: Reported on 8/22/2024) 30 capsule 0    aspirin 81 MG chewable tablet Take 1 tablet by mouth daily (Patient not taking: Reported on 10/9/2024)       No

## 2024-10-09 NOTE — PROGRESS NOTES
Terell Martinez  10/9/2024  10:00 AM      Vitals:    10/09/24 0958   BP: (!) 146/74   Pulse: 87   Resp: 18   Temp: 97.6 °F (36.4 °C)   SpO2: 97%    :    Wt Readings from Last 3 Encounters:   10/09/24 80.8 kg (178 lb 1.6 oz)   08/22/24 81.5 kg (179 lb 9.6 oz)   07/19/24 75.8 kg (167 lb)                Current Outpatient Medications:     folic acid (FOLVITE) 1 MG tablet, Take 1 tablet by mouth daily, Disp: , Rfl:     gabapentin (NEURONTIN) 100 MG capsule, Take 1 capsule by mouth 3 times daily., Disp: , Rfl:     tamsulosin (FLOMAX) 0.4 MG capsule, Take 1 capsule by mouth daily (Patient not taking: Reported on 8/22/2024), Disp: 30 capsule, Rfl: 0    atorvastatin (LIPITOR) 10 MG tablet, Take 0.5 tablets by mouth daily (Patient not taking: Reported on 8/22/2024), Disp: , Rfl:     vitamin D (VITAMIN D3) 50 MCG (2000 UT) CAPS capsule, Take 1 capsule by mouth daily, Disp: , Rfl:     aspirin 81 MG chewable tablet, Take 1 tablet by mouth daily (Patient not taking: Reported on 8/22/2024), Disp: , Rfl:       Patient is seen today in follow up for completion of srs 9/10/24 1fx/2400cGy.        FALLS RISK SCREENING ASSESSMENT    Instructions:  Assess the patient and enter the appropriate indicators that are present for fall risk identification.   Total the numbers entered and assign a fall risk score from Table 2.  Reassess patient at a minimum every 12 weeks or with status change.    Assessment   Date  10/9/2024     1.  Mental Ability: confusion/cognitively impaired No - 0       2.  Elimination Issues: incontinence, frequency No - 0       3.  Ambulatory: use of assistive devices (walker, cane, off-loading devices), attached to equipment (IV pole, oxygen) No - 0     4.  Sensory Limitations: dizziness, vertigo, impaired vision Yes - 3       5.  Age 65 years or greater - 1       6.  Medication: diuretics, strong analgesics, hypnotics, sedatives, antihypertensive agents   Yes - 3   7.  Falls:  recent history of falls within the last 3

## 2024-10-14 ENCOUNTER — OFFICE VISIT (OUTPATIENT)
Dept: PALLATIVE CARE | Age: 79
End: 2024-10-14
Payer: OTHER GOVERNMENT

## 2024-10-14 VITALS
DIASTOLIC BLOOD PRESSURE: 88 MMHG | SYSTOLIC BLOOD PRESSURE: 136 MMHG | BODY MASS INDEX: 28.51 KG/M2 | OXYGEN SATURATION: 96 % | WEIGHT: 174 LBS | HEART RATE: 110 BPM | TEMPERATURE: 99 F

## 2024-10-14 DIAGNOSIS — G89.3 PAIN DUE TO NEOPLASM: ICD-10-CM

## 2024-10-14 DIAGNOSIS — Z71.89 GOALS OF CARE, COUNSELING/DISCUSSION: ICD-10-CM

## 2024-10-14 DIAGNOSIS — C79.31 METASTASIS TO BRAIN (HCC): ICD-10-CM

## 2024-10-14 DIAGNOSIS — C34.91 ADENOCARCINOMA, LUNG, RIGHT (HCC): ICD-10-CM

## 2024-10-14 DIAGNOSIS — Z51.5 PALLIATIVE CARE BY SPECIALIST: Primary | ICD-10-CM

## 2024-10-14 DIAGNOSIS — Z79.891 USE OF OPIATES FOR THERAPEUTIC PURPOSES: ICD-10-CM

## 2024-10-14 PROCEDURE — 99203 OFFICE O/P NEW LOW 30 MIN: CPT | Performed by: NURSE PRACTITIONER

## 2024-10-14 PROCEDURE — 1123F ACP DISCUSS/DSCN MKR DOCD: CPT | Performed by: NURSE PRACTITIONER

## 2024-10-14 PROCEDURE — 99205 OFFICE O/P NEW HI 60 MIN: CPT | Performed by: NURSE PRACTITIONER

## 2024-10-14 PROCEDURE — 99497 ADVNCD CARE PLAN 30 MIN: CPT | Performed by: NURSE PRACTITIONER

## 2024-10-14 PROCEDURE — 99204 OFFICE O/P NEW MOD 45 MIN: CPT | Performed by: NURSE PRACTITIONER

## 2024-10-14 RX ORDER — HYDROCODONE BITARTRATE AND ACETAMINOPHEN 5; 325 MG/1; MG/1
1 TABLET ORAL EVERY 6 HOURS PRN
Qty: 60 TABLET | Refills: 0 | Status: SHIPPED | OUTPATIENT
Start: 2024-10-14 | End: 2024-10-29

## 2024-10-14 RX ORDER — ROSUVASTATIN CALCIUM 10 MG/1
10 TABLET, COATED ORAL DAILY
COMMUNITY
Start: 2024-08-29

## 2024-10-14 NOTE — PROGRESS NOTES
Advance Care Planning      Palliative Medicine Provider (MD/NP)  Advance Care Planning (ACP) Conversation      Date of Conversation: 10/14/24  The patient and/or authorized decision maker consented to a voluntary Advance Care Planning conversation.   Individuals present for the conversation:   Patient with decision making capacity    Legal Healthcare Agent(s):  Lena Stauffer ([phone]) is the patient's cousin, and whom he reports is his healthcare power of   Tripp Martinez ([phone]) the patient's son    ACP documents available in EMR prior to discussion:  -None    Primary Palliative Diagnosis(es):  Stage IV lung cancer    Conversation Summary:  Goals of care and advance care planning were discussed with Gina today  He states he does believe he has his healthcare power of  completed, he states his cousin Lena is who he believes is his healthcare power of   It was noted that she is not listed on his emergency contacts, however she was added today  He also reached out to her by telephone during our encounter and she and she confirms she is willing to participate as a healthcare surrogate if needed  I encouraged him to look and see if he can find his papers, as he does believe he has these completed  We also discussed resuscitation, and he is very clear he would not wish to be resuscitated in the event of cardiopulmonary arrest  He is agreeable to a CODE STATUS of DNR CCA  The [place] DNR form was completed today, and copies provided for him, as well as scanned into epic media    Resuscitation Status:    Code Status: DNR-CCA    Outcomes / Completed Documentation:  An explanation of advance directives and their importance was provided and the following forms completed:    -Other [place] DNR order form    If new document completed, original was provided to patient and/or family member.    Copy was placed for scanning into the [facility] EMR.      I spent 30 minutes providing separately identifiable ACP services

## 2024-10-14 NOTE — PROGRESS NOTES
Palliative Care Department  Provider: JANIE Cardenas - CNP    Location of Service:   [facility]    Chief Complaint: Terell Martinez is a 79 y.o. 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 2024   -  s/p palliative    -  On Alimta and Keytruda    Pain related to neoplasm:   -  Gabapentin 100 mg BID   -  Norco 5-325 mg Q 6 PRN    Follow Up:  4 weeks.  They were encouraged to call with any questions, concerns, needs, or changes in symptoms.    Subjective:     HPI:  Terell Martinez is a 79 y.o. male with stage IV lung cancer in April 2024, known to the [facility].  He completed SRS to metastatic brain lesion in September 2024.  He additionally has bone mets including his thoracic spine, as well as his hip.  He was referred to [facility] Palliative Medicine support.    Subjective/Events/Discussions:  Terell presents today unaccompanied  He is alert and oriented able voice his needs and concerns well  Palliative medicine was introduced, we discussed the role of palliative medicine in his care, including assistance with goals of care, advance care planning, as well as symptomatic management  Symptomatically he has complaints of pain, fatigue, decreased appetite, and reduced overall sense of wellbeing  For pain he states he was prescribed gabapentin 100 mg, he takes this 2 times per day  States this was prescribed for him a few months ago, and is only provided mild benefit  He does have chronic back pain, but he has now developed pain within his right posterior chest wall with radiation around the right side of his chest to the front  This is made worse with coughing and taking deep breaths  Times the pain can be severe, and he rates it as a 7 on a scale of 10 today  We discussed options, and he states that he had

## 2024-10-15 LAB
6-MAM, QUANTITATIVE, URINE: <10 NG/ML
6-MONOACETYLMORPHINE, URINE: NEGATIVE
7-AMINOCLONAZEPAM, QUANTITATIVE, URINE: <50 NG/ML
ABNORMAL SPECIMEN VALIDITY TEST: ABNORMAL
ALCOHOL URINE: NOT DETECTED MG/DL
ALPHA-HYDROXYALPRAZOLAM, QUANTITATIVE, URINE: <50 NG/ML
ALPHA-HYDROXYMIDAZOLAM, QUANTITATIVE, URINE: <50 NG/ML
ALPHA-HYDROXYTRIAZOLAM, QUANTITATIVE, URINE: <50 NG/ML
ALPRAZOLAM URINE QUANT: <50 NG/ML
AMPHETAMINE SCREEN URINE: NEGATIVE
BARBITURATE SCREEN URINE: NEGATIVE
BENZODIAZEPINE SCREEN, URINE: NEGATIVE
BUPRENORPHINE URINE: NEGATIVE
CANNABINOID SCREEN URINE: POSITIVE
CHLORDIAZEPOXIDE, QUANTITATIVE, URINE: <50 NG/ML
CLONAZEPAM, QUANTITATIVE, URINE: <50 NG/ML
COCAINE METABOLITE, URINE: NEGATIVE
CODEINE, QUANTITATIVE, URINE: <50 NG/ML
COMPLIANCE DRUG ANALYSIS, URINE: NORMAL
DIAZEPAM URINE QUANT: <50 NG/ML
FENTANYL URINE: NEGATIVE
FLUNITRAZEPAM, QUANTITATIVE, URINE: <50 NG/ML
FLURAZEPAM, QUANTITATIVE, URINE: <50 NG/ML
HYDROCODONE, QUANTITATIVE, URINE: <50 NG/ML
HYDROMORPHONE, QUANTITATIVE, URINE: <50 NG/ML
INTEGRITY CHECK, CREATININE, URINE: 310.5 MG/DL (ref 22–250)
INTEGRITY CHECK, OXIDANT, URINE: 59 MG/L
INTEGRITY CHECK, PH, URINE: 5.4 (ref 4.5–9)
INTEGRITY CHECK, SPECIFIC GRAVITY, URINE: 1.02 (ref 1–1.03)
LORAZEPAM URINE QUANT: <50 NG/ML
METHADONE SCREEN, URINE: NEGATIVE
MIDAZOLAM URINE QUANT: <50 NG/ML
MORPHINE, QUANTITATIVE, URINE: <50 NG/ML
NORDIAZEPAM URINE QUANT: <50 NG/ML
NORHYDROCODONE, QUANTITATIVE, URINE: <50 NG/ML
NOROXYCODONE, QUANTITATIVE, URINE: <50 NG/ML
OPIATES, URINE: NEGATIVE
OXAZEPAM URINE QUANT: <50 NG/ML
OXYCODONE SCREEN URINE: NEGATIVE
OXYCODONE URINE, QUANTITATIVE: <50 NG/ML
OXYMORPHONE, QUANTITATIVE, URINE: <50 NG/ML
PCP,URINE: NEGATIVE
TEMAZEPAM, QUANTITATIVE, URINE: <50 NG/ML
TEST INFORMATION: ABNORMAL
THC NORMALIZED, QUANTITIATIVE, URINE: 29.2 NG/ML
THC-COOH, QUANTITATIVE, URINE: 90.6 NG/ML
TRAMADOL, URINE: NEGATIVE

## 2024-10-23 ENCOUNTER — APPOINTMENT (OUTPATIENT)
Dept: CT IMAGING | Age: 79
DRG: 181 | End: 2024-10-23
Payer: MEDICARE

## 2024-10-23 ENCOUNTER — HOSPITAL ENCOUNTER (INPATIENT)
Age: 79
LOS: 2 days | Discharge: HOME HEALTH | DRG: 181 | End: 2024-10-25
Attending: EMERGENCY MEDICINE | Admitting: INTERNAL MEDICINE
Payer: MEDICARE

## 2024-10-23 DIAGNOSIS — C34.31 MALIGNANT NEOPLASM OF LOWER LOBE OF RIGHT LUNG (HCC): Primary | ICD-10-CM

## 2024-10-23 DIAGNOSIS — R06.02 SHORTNESS OF BREATH: ICD-10-CM

## 2024-10-23 DIAGNOSIS — J90 PLEURAL EFFUSION ON RIGHT: ICD-10-CM

## 2024-10-23 DIAGNOSIS — E44.0 MODERATE PROTEIN-CALORIE MALNUTRITION (HCC): ICD-10-CM

## 2024-10-23 DIAGNOSIS — J90 PLEURAL EFFUSION: ICD-10-CM

## 2024-10-23 PROBLEM — Z92.3 STATUS POST STEREOTACTIC RADIOSURGERY: Status: ACTIVE | Noted: 2024-09-10

## 2024-10-23 LAB
ALBUMIN SERPL-MCNC: 3.5 G/DL (ref 3.5–5.2)
ALP SERPL-CCNC: 75 U/L (ref 40–129)
ALT SERPL-CCNC: 71 U/L (ref 0–40)
ANION GAP SERPL CALCULATED.3IONS-SCNC: 16 MMOL/L (ref 7–16)
AST SERPL-CCNC: 75 U/L (ref 0–39)
B PARAP IS1001 DNA NPH QL NAA+NON-PROBE: NOT DETECTED
B PERT DNA SPEC QL NAA+PROBE: NOT DETECTED
BASOPHILS # BLD: 0.03 K/UL (ref 0–0.2)
BASOPHILS NFR BLD: 1 % (ref 0–2)
BILIRUB DIRECT SERPL-MCNC: 0.2 MG/DL (ref 0–0.3)
BILIRUB INDIRECT SERPL-MCNC: 0.5 MG/DL (ref 0–1)
BILIRUB SERPL-MCNC: 0.7 MG/DL (ref 0–1.2)
BILIRUB UR QL STRIP: NEGATIVE
BUN SERPL-MCNC: 18 MG/DL (ref 6–23)
C PNEUM DNA NPH QL NAA+NON-PROBE: NOT DETECTED
CALCIUM SERPL-MCNC: 9.2 MG/DL (ref 8.6–10.2)
CHLORIDE SERPL-SCNC: 95 MMOL/L (ref 98–107)
CK SERPL-CCNC: 77 U/L (ref 20–200)
CLARITY UR: CLEAR
CO2 SERPL-SCNC: 25 MMOL/L (ref 22–29)
COLOR UR: YELLOW
CREAT SERPL-MCNC: 1 MG/DL (ref 0.7–1.2)
EOSINOPHIL # BLD: 0.08 K/UL (ref 0.05–0.5)
EOSINOPHILS RELATIVE PERCENT: 1 % (ref 0–6)
ERYTHROCYTE [DISTWIDTH] IN BLOOD BY AUTOMATED COUNT: 14.2 % (ref 11.5–15)
FLUAV RNA NPH QL NAA+NON-PROBE: NOT DETECTED
FLUBV RNA NPH QL NAA+NON-PROBE: NOT DETECTED
GFR, ESTIMATED: 75 ML/MIN/1.73M2
GLUCOSE SERPL-MCNC: 114 MG/DL (ref 74–99)
GLUCOSE UR STRIP-MCNC: NEGATIVE MG/DL
HADV DNA NPH QL NAA+NON-PROBE: NOT DETECTED
HCOV 229E RNA NPH QL NAA+NON-PROBE: NOT DETECTED
HCOV HKU1 RNA NPH QL NAA+NON-PROBE: NOT DETECTED
HCOV NL63 RNA NPH QL NAA+NON-PROBE: NOT DETECTED
HCOV OC43 RNA NPH QL NAA+NON-PROBE: NOT DETECTED
HCT VFR BLD AUTO: 34.6 % (ref 37–54)
HGB BLD-MCNC: 10.8 G/DL (ref 12.5–16.5)
HGB UR QL STRIP.AUTO: ABNORMAL
HMPV RNA NPH QL NAA+NON-PROBE: NOT DETECTED
HPIV1 RNA NPH QL NAA+NON-PROBE: NOT DETECTED
HPIV2 RNA NPH QL NAA+NON-PROBE: NOT DETECTED
HPIV3 RNA NPH QL NAA+NON-PROBE: NOT DETECTED
HPIV4 RNA NPH QL NAA+NON-PROBE: NOT DETECTED
IMM GRANULOCYTES # BLD AUTO: 0.06 K/UL (ref 0–0.58)
IMM GRANULOCYTES NFR BLD: 1 % (ref 0–5)
INFLUENZA A BY PCR: NOT DETECTED
INFLUENZA B BY PCR: NOT DETECTED
INR PPP: 1.6
KETONES UR STRIP-MCNC: NEGATIVE MG/DL
LACTATE BLDV-SCNC: 1.4 MMOL/L (ref 0.5–1.9)
LACTATE BLDV-SCNC: 2.4 MMOL/L (ref 0.5–1.9)
LEUKOCYTE ESTERASE UR QL STRIP: NEGATIVE
LIPASE SERPL-CCNC: 49 U/L (ref 13–60)
LYMPHOCYTES NFR BLD: 0.82 K/UL (ref 1.5–4)
LYMPHOCYTES RELATIVE PERCENT: 13 % (ref 20–42)
M PNEUMO DNA NPH QL NAA+NON-PROBE: NOT DETECTED
MAGNESIUM SERPL-MCNC: 1.9 MG/DL (ref 1.6–2.6)
MCH RBC QN AUTO: 28.7 PG (ref 26–35)
MCHC RBC AUTO-ENTMCNC: 31.2 G/DL (ref 32–34.5)
MCV RBC AUTO: 92 FL (ref 80–99.9)
MONOCYTES NFR BLD: 0.83 K/UL (ref 0.1–0.95)
MONOCYTES NFR BLD: 13 % (ref 2–12)
NEUTROPHILS NFR BLD: 71 % (ref 43–80)
NEUTS SEG NFR BLD: 4.49 K/UL (ref 1.8–7.3)
NITRITE UR QL STRIP: NEGATIVE
PH UR STRIP: 6.5 [PH] (ref 5–9)
PLATELET # BLD AUTO: 460 K/UL (ref 130–450)
PMV BLD AUTO: 9.1 FL (ref 7–12)
POTASSIUM SERPL-SCNC: 3.1 MMOL/L (ref 3.5–5)
PROT SERPL-MCNC: 7.1 G/DL (ref 6.4–8.3)
PROT UR STRIP-MCNC: ABNORMAL MG/DL
PROTHROMBIN TIME: 16.4 SEC (ref 9.3–12.4)
RBC # BLD AUTO: 3.76 M/UL (ref 3.8–5.8)
RBC #/AREA URNS HPF: ABNORMAL /HPF
RSV RNA NPH QL NAA+NON-PROBE: NOT DETECTED
RV+EV RNA NPH QL NAA+NON-PROBE: NOT DETECTED
SARS-COV-2 RDRP RESP QL NAA+PROBE: NOT DETECTED
SARS-COV-2 RNA NPH QL NAA+NON-PROBE: NOT DETECTED
SODIUM SERPL-SCNC: 136 MMOL/L (ref 132–146)
SP GR UR STRIP: <1.005 (ref 1–1.03)
SPECIMEN DESCRIPTION: NORMAL
SPECIMEN DESCRIPTION: NORMAL
T4 FREE SERPL-MCNC: 1.2 NG/DL (ref 0.9–1.7)
TROPONIN I SERPL HS-MCNC: 21 NG/L (ref 0–11)
TROPONIN I SERPL HS-MCNC: 22 NG/L (ref 0–11)
TSH SERPL DL<=0.05 MIU/L-ACNC: 0.76 UIU/ML (ref 0.27–4.2)
URATE SERPL-MCNC: 5.4 MG/DL (ref 3.4–7)
UROBILINOGEN UR STRIP-ACNC: 1 EU/DL (ref 0–1)
WBC #/AREA URNS HPF: ABNORMAL /HPF
WBC OTHER # BLD: 6.3 K/UL (ref 4.5–11.5)

## 2024-10-23 PROCEDURE — 87635 SARS-COV-2 COVID-19 AMP PRB: CPT

## 2024-10-23 PROCEDURE — 83735 ASSAY OF MAGNESIUM: CPT

## 2024-10-23 PROCEDURE — 2580000003 HC RX 258: Performed by: INTERNAL MEDICINE

## 2024-10-23 PROCEDURE — 99285 EMERGENCY DEPT VISIT HI MDM: CPT

## 2024-10-23 PROCEDURE — 2060000000 HC ICU INTERMEDIATE R&B

## 2024-10-23 PROCEDURE — 85025 COMPLETE CBC W/AUTO DIFF WBC: CPT

## 2024-10-23 PROCEDURE — 84443 ASSAY THYROID STIM HORMONE: CPT

## 2024-10-23 PROCEDURE — 80053 COMPREHEN METABOLIC PANEL: CPT

## 2024-10-23 PROCEDURE — 6370000000 HC RX 637 (ALT 250 FOR IP)

## 2024-10-23 PROCEDURE — 70450 CT HEAD/BRAIN W/O DYE: CPT

## 2024-10-23 PROCEDURE — 0202U NFCT DS 22 TRGT SARS-COV-2: CPT

## 2024-10-23 PROCEDURE — 84550 ASSAY OF BLOOD/URIC ACID: CPT

## 2024-10-23 PROCEDURE — 81001 URINALYSIS AUTO W/SCOPE: CPT

## 2024-10-23 PROCEDURE — 74177 CT ABD & PELVIS W/CONTRAST: CPT

## 2024-10-23 PROCEDURE — 85610 PROTHROMBIN TIME: CPT

## 2024-10-23 PROCEDURE — 6370000000 HC RX 637 (ALT 250 FOR IP): Performed by: INTERNAL MEDICINE

## 2024-10-23 PROCEDURE — 84484 ASSAY OF TROPONIN QUANT: CPT

## 2024-10-23 PROCEDURE — 93005 ELECTROCARDIOGRAM TRACING: CPT

## 2024-10-23 PROCEDURE — 83605 ASSAY OF LACTIC ACID: CPT

## 2024-10-23 PROCEDURE — 82248 BILIRUBIN DIRECT: CPT

## 2024-10-23 PROCEDURE — 71275 CT ANGIOGRAPHY CHEST: CPT

## 2024-10-23 PROCEDURE — 87040 BLOOD CULTURE FOR BACTERIA: CPT

## 2024-10-23 PROCEDURE — 83690 ASSAY OF LIPASE: CPT

## 2024-10-23 PROCEDURE — 82550 ASSAY OF CK (CPK): CPT

## 2024-10-23 PROCEDURE — 87502 INFLUENZA DNA AMP PROBE: CPT

## 2024-10-23 PROCEDURE — 84439 ASSAY OF FREE THYROXINE: CPT

## 2024-10-23 PROCEDURE — 6360000004 HC RX CONTRAST MEDICATION: Performed by: RADIOLOGY

## 2024-10-23 PROCEDURE — 6360000002 HC RX W HCPCS: Performed by: INTERNAL MEDICINE

## 2024-10-23 RX ORDER — ONDANSETRON 4 MG/1
4 TABLET, ORALLY DISINTEGRATING ORAL EVERY 8 HOURS PRN
Status: DISCONTINUED | OUTPATIENT
Start: 2024-10-23 | End: 2024-10-25 | Stop reason: HOSPADM

## 2024-10-23 RX ORDER — POTASSIUM CHLORIDE 1500 MG/1
40 TABLET, EXTENDED RELEASE ORAL ONCE
Status: COMPLETED | OUTPATIENT
Start: 2024-10-23 | End: 2024-10-23

## 2024-10-23 RX ORDER — ACETAMINOPHEN 650 MG/1
650 SUPPOSITORY RECTAL EVERY 6 HOURS PRN
Status: DISCONTINUED | OUTPATIENT
Start: 2024-10-23 | End: 2024-10-25 | Stop reason: HOSPADM

## 2024-10-23 RX ORDER — ROSUVASTATIN CALCIUM 5 MG/1
5 TABLET, COATED ORAL NIGHTLY
Status: DISCONTINUED | OUTPATIENT
Start: 2024-10-23 | End: 2024-10-25 | Stop reason: HOSPADM

## 2024-10-23 RX ORDER — SODIUM CHLORIDE 0.9 % (FLUSH) 0.9 %
10 SYRINGE (ML) INJECTION EVERY 12 HOURS SCHEDULED
Status: DISCONTINUED | OUTPATIENT
Start: 2024-10-23 | End: 2024-10-25 | Stop reason: HOSPADM

## 2024-10-23 RX ORDER — HYDROCODONE BITARTRATE AND ACETAMINOPHEN 5; 325 MG/1; MG/1
1 TABLET ORAL EVERY 6 HOURS PRN
Status: DISCONTINUED | OUTPATIENT
Start: 2024-10-23 | End: 2024-10-25 | Stop reason: HOSPADM

## 2024-10-23 RX ORDER — MAGNESIUM SULFATE IN WATER 40 MG/ML
2000 INJECTION, SOLUTION INTRAVENOUS PRN
Status: DISCONTINUED | OUTPATIENT
Start: 2024-10-23 | End: 2024-10-25 | Stop reason: HOSPADM

## 2024-10-23 RX ORDER — ACETAMINOPHEN 325 MG/1
650 TABLET ORAL EVERY 6 HOURS PRN
Status: DISCONTINUED | OUTPATIENT
Start: 2024-10-23 | End: 2024-10-25 | Stop reason: HOSPADM

## 2024-10-23 RX ORDER — ONDANSETRON 2 MG/ML
4 INJECTION INTRAMUSCULAR; INTRAVENOUS EVERY 6 HOURS PRN
Status: DISCONTINUED | OUTPATIENT
Start: 2024-10-23 | End: 2024-10-25 | Stop reason: HOSPADM

## 2024-10-23 RX ORDER — IOPAMIDOL 755 MG/ML
75 INJECTION, SOLUTION INTRAVASCULAR
Status: COMPLETED | OUTPATIENT
Start: 2024-10-23 | End: 2024-10-23

## 2024-10-23 RX ORDER — SENNOSIDES A AND B 8.6 MG/1
1 TABLET, FILM COATED ORAL DAILY PRN
Status: DISCONTINUED | OUTPATIENT
Start: 2024-10-23 | End: 2024-10-25 | Stop reason: HOSPADM

## 2024-10-23 RX ORDER — GABAPENTIN 300 MG/1
300 CAPSULE ORAL 2 TIMES DAILY
Status: DISCONTINUED | OUTPATIENT
Start: 2024-10-23 | End: 2024-10-25 | Stop reason: HOSPADM

## 2024-10-23 RX ORDER — POTASSIUM CHLORIDE 1500 MG/1
40 TABLET, EXTENDED RELEASE ORAL PRN
Status: DISCONTINUED | OUTPATIENT
Start: 2024-10-23 | End: 2024-10-25 | Stop reason: HOSPADM

## 2024-10-23 RX ORDER — POTASSIUM CHLORIDE 7.45 MG/ML
10 INJECTION INTRAVENOUS PRN
Status: DISCONTINUED | OUTPATIENT
Start: 2024-10-23 | End: 2024-10-25 | Stop reason: HOSPADM

## 2024-10-23 RX ORDER — SODIUM CHLORIDE 9 MG/ML
INJECTION, SOLUTION INTRAVENOUS PRN
Status: DISCONTINUED | OUTPATIENT
Start: 2024-10-23 | End: 2024-10-25 | Stop reason: HOSPADM

## 2024-10-23 RX ORDER — SODIUM CHLORIDE 0.9 % (FLUSH) 0.9 %
10 SYRINGE (ML) INJECTION PRN
Status: DISCONTINUED | OUTPATIENT
Start: 2024-10-23 | End: 2024-10-25 | Stop reason: HOSPADM

## 2024-10-23 RX ORDER — VITAMIN B COMPLEX
2000 TABLET ORAL EVERY MORNING
Status: DISCONTINUED | OUTPATIENT
Start: 2024-10-24 | End: 2024-10-25 | Stop reason: HOSPADM

## 2024-10-23 RX ORDER — FOLIC ACID 1 MG/1
1 TABLET ORAL EVERY MORNING
Status: DISCONTINUED | OUTPATIENT
Start: 2024-10-24 | End: 2024-10-25 | Stop reason: HOSPADM

## 2024-10-23 RX ORDER — LANOLIN ALCOHOL/MO/W.PET/CERES
3 CREAM (GRAM) TOPICAL NIGHTLY PRN
Status: DISCONTINUED | OUTPATIENT
Start: 2024-10-23 | End: 2024-10-25 | Stop reason: HOSPADM

## 2024-10-23 RX ORDER — ENOXAPARIN SODIUM 100 MG/ML
40 INJECTION SUBCUTANEOUS DAILY
Status: DISCONTINUED | OUTPATIENT
Start: 2024-10-23 | End: 2024-10-25 | Stop reason: HOSPADM

## 2024-10-23 RX ADMIN — IOPAMIDOL 75 ML: 755 INJECTION, SOLUTION INTRAVENOUS at 13:35

## 2024-10-23 RX ADMIN — ROSUVASTATIN CALCIUM 5 MG: 5 TABLET, FILM COATED ORAL at 20:45

## 2024-10-23 RX ADMIN — ENOXAPARIN SODIUM 40 MG: 100 INJECTION SUBCUTANEOUS at 17:41

## 2024-10-23 RX ADMIN — GABAPENTIN 300 MG: 300 CAPSULE ORAL at 20:45

## 2024-10-23 RX ADMIN — SODIUM CHLORIDE, PRESERVATIVE FREE 10 ML: 5 INJECTION INTRAVENOUS at 21:06

## 2024-10-23 RX ADMIN — POTASSIUM CHLORIDE 40 MEQ: 1500 TABLET, EXTENDED RELEASE ORAL at 13:01

## 2024-10-23 NOTE — CARE COORDINATION
Internal Medicine On-call Care Coordination Note    I was called by the ED physician because they recommended admission for this patient and I cover their PCP.  The history as I understand it after discussion with the ED physician is as follows:    The patient presented from her oncology office with shortness of breath  She was found of a pleural effusion  She has a history of lung cancer and brain metastasis  She has a low potassium and this was replaced in the ED    I placed admission orders.  Including:    Malignant pleural effusion:  Imaging studies noted  Pulmonology consult    Lung cancer with brain metastasis:  Oncology consultation    Hypokalemia:   Replace K   Follow BMP   Mag level 1.9    Dr. Mcgovenr or his coverage will see the patient tomorrow for H&P.    Electronically signed by Nicolas Mcgovern DO on 10/23/2024 at 3:57 PM

## 2024-10-23 NOTE — ED PROVIDER NOTES
Department of Emergency Medicine     Written by: Mai Batista MD  Patient Name: Terell Martinez  Admit Date: 10/23/2024 10:33 AM  MRN: [id number]                   : 1945    [facility]     Chief Complaint   Patient presents with    Shortness of Breath     Sent over by [facility].        Terell Martinez is a 79 y.o. male that presents to the ED with shortness of breath.  He was sent from the [facility].  The patient has a history of lung cancer metastatic to the brain.  He last received chemotherapy 3 weeks ago, he was going to the [facility] today for another chemotherapy round, however they noted he was lethargic short of breath fatigued and far from his baseline.  He says he feels ill, nauseous unable to eat and significantly short of breath at rest and upon exertion for the last 3 weeks after his last therapy.  He denies any fevers or chills or sweats.  No cough congestion rhinorrhea.  No diarrhea constipation.  He has no abdominal pain.  No chest pain only feeling short of breath.    Review of systems   Pertinent positives and negatives mentioned in the HPI/MDM.      Physical   Physical Exam  Constitutional:       General: He is not in acute distress.     Appearance: Normal appearance.      Comments: Somewhat cachectic   Eyes:      Extraocular Movements: Extraocular movements intact.      Pupils: Pupils are equal, round, and reactive to light.   Cardiovascular:      Rate and Rhythm: Normal rate and regular rhythm.   Pulmonary:      Effort: Pulmonary effort is normal. No respiratory distress.      Breath sounds: No decreased breath sounds or wheezing.   Chest:      Chest wall: No mass or tenderness.   Abdominal:      Palpations: Abdomen is soft.      Tenderness: There is no abdominal tenderness.   Musculoskeletal:      Right lower leg: No edema.      Left lower leg: No edema.   Skin:     General: Skin is warm and dry.      Capillary Refill: Capillary refill takes less than 2 seconds.      Coloration: Skin is not

## 2024-10-23 NOTE — ED NOTES
ED to Inpatient Handoff Report    Notified Aaliyah that electronic handoff available and patient ready for transport to room 602.    Safety Risks: None identified    Patient in Restraints: no    Constant Observer or Patient : no    Telemetry Monitoring Ordered :No           Order to transfer to unit without monitor:N/A    Last MEWS:  Time completed: 1626    Deterioration Index Score:   Predictive Model Details          22 (Normal)  Factor Value    Calculated 10/23/2024 16:24 63% Age 79 years old    Deterioration Index Model 12% Respiratory rate 18     6% Potassium abnormal (3.1 mmol/L)     5% Pulse oximetry 93 %     4% Systolic 124     3% Pulse 91     3% Sodium 136 mmol/L     2% Platelet count abnormal (460 k/uL)     1% Hematocrit abnormal (34.6 %)     1% Temperature 99.1 °F (37.3 °C)     0% WBC count 6.3 k/uL        Vitals:    10/23/24 1030 10/23/24 1147 10/23/24 1301   BP: 117/76 118/80 124/70   Pulse: (!) 102 94 91   Resp: 16 17 18   Temp: 99.1 °F (37.3 °C)     TempSrc: Oral     SpO2: 94% 93%    Weight: 73.9 kg (163 lb)     Height: 1.664 m (5' 5.5\")           Opportunity for questions and clarification was provided.

## 2024-10-23 NOTE — PROGRESS NOTES
4 Eyes Skin Assessment     NAME:  Terell Martinez  YOB: 1945  MEDICAL RECORD NUMBER:  [id number]    The patient is being assessed for  Admission    I agree that at least one RN has performed a thorough Head to Toe Skin Assessment on the patient. ALL assessment sites listed below have been assessed.      Areas assessed by both nurses:    Head, Face, Ears, Shoulders, Back, Chest, Arms, Elbows, Hands, Legs. Feet and Heels, and Under Medical Devices  patient would not let us look at buttocks/coccyx         Does the Patient have a Wound? No noted wound(s)       Tello Prevention initiated by RN: No  Wound Care Orders initiated by RN: No    Pressure Injury (Stage 3,4, Unstageable, DTI, NWPT, and Complex wounds) if present, place Wound referral order by RN under : No    New Ostomies, if present place, Ostomy referral order under : No     Nurse 1 eSignature: Electronically signed by Aaliyah Garcia RN on 10/23/24 at 5:49 PM EDT    **SHARE this note so that the co-signing nurse can place an eSignature**    Nurse 2 eSignature: Electronically signed by MADAN TEMPLETON RN on 10/23/24 at 6:21 PM EDT

## 2024-10-24 ENCOUNTER — APPOINTMENT (OUTPATIENT)
Dept: ULTRASOUND IMAGING | Age: 79
DRG: 181 | End: 2024-10-24
Payer: MEDICARE

## 2024-10-24 ENCOUNTER — APPOINTMENT (OUTPATIENT)
Dept: GENERAL RADIOLOGY | Age: 79
DRG: 181 | End: 2024-10-24
Payer: MEDICARE

## 2024-10-24 PROBLEM — E44.0 MODERATE PROTEIN-CALORIE MALNUTRITION (HCC): Status: ACTIVE | Noted: 2024-10-24

## 2024-10-24 LAB
ALBUMIN SERPL-MCNC: 3.1 G/DL (ref 3.5–5.2)
ALP SERPL-CCNC: 59 U/L (ref 40–129)
ALT SERPL-CCNC: 93 U/L (ref 0–40)
ANION GAP SERPL CALCULATED.3IONS-SCNC: 10 MMOL/L (ref 7–16)
APPEARANCE FLD: NORMAL
AST SERPL-CCNC: 121 U/L (ref 0–39)
BASOPHILS # BLD: 0.03 K/UL (ref 0–0.2)
BASOPHILS NFR BLD: 1 % (ref 0–2)
BILIRUB SERPL-MCNC: 0.5 MG/DL (ref 0–1.2)
BODY FLD TYPE: NORMAL
BUN SERPL-MCNC: 16 MG/DL (ref 6–23)
CALCIUM SERPL-MCNC: 8.7 MG/DL (ref 8.6–10.2)
CARCINOEMBRYONIC ANTIGEN: 1000 NG/ML
CHLORIDE SERPL-SCNC: 101 MMOL/L (ref 98–107)
CLOT CHECK: NORMAL
CO2 SERPL-SCNC: 25 MMOL/L (ref 22–29)
COLOR FLD: YELLOW
CREAT SERPL-MCNC: 0.9 MG/DL (ref 0.7–1.2)
EOSINOPHIL # BLD: 0.36 K/UL (ref 0.05–0.5)
EOSINOPHILS RELATIVE PERCENT: 6 % (ref 0–6)
ERYTHROCYTE [DISTWIDTH] IN BLOOD BY AUTOMATED COUNT: 14.4 % (ref 11.5–15)
GFR, ESTIMATED: 86 ML/MIN/1.73M2
GLUCOSE FLD-MCNC: 114 MG/DL
GLUCOSE SERPL-MCNC: 101 MG/DL (ref 74–99)
HCT VFR BLD AUTO: 30.9 % (ref 37–54)
HGB BLD-MCNC: 9.8 G/DL (ref 12.5–16.5)
IMM GRANULOCYTES # BLD AUTO: 0.04 K/UL (ref 0–0.58)
IMM GRANULOCYTES NFR BLD: 1 % (ref 0–5)
LDH FLD L TO P-CCNC: 1028 U/L
LDH SERPL-CCNC: 516 U/L (ref 135–225)
LYMPHOCYTES NFR BLD: 1.05 K/UL (ref 1.5–4)
LYMPHOCYTES RELATIVE PERCENT: 19 % (ref 20–42)
MCH RBC QN AUTO: 29.3 PG (ref 26–35)
MCHC RBC AUTO-ENTMCNC: 31.7 G/DL (ref 32–34.5)
MCV RBC AUTO: 92.5 FL (ref 80–99.9)
MONOCYTES NFR BLD: 0.93 K/UL (ref 0.1–0.95)
MONOCYTES NFR BLD: 16 % (ref 2–12)
MONOCYTES NFR FLD: 93 %
NEUTROPHILS NFR BLD: 58 % (ref 43–80)
NEUTROPHILS NFR FLD: 7 %
NEUTS SEG NFR BLD: 3.27 K/UL (ref 1.8–7.3)
PLATELET # BLD AUTO: 425 K/UL (ref 130–450)
PMV BLD AUTO: 9.4 FL (ref 7–12)
POTASSIUM SERPL-SCNC: 3.8 MMOL/L (ref 3.5–5)
PROT FLD-MCNC: 4.7 G/DL
PROT SERPL-MCNC: 6.3 G/DL (ref 6.4–8.3)
RBC # BLD AUTO: 3.34 M/UL (ref 3.8–5.8)
RBC # FLD: 7000 CELLS/UL
SODIUM SERPL-SCNC: 136 MMOL/L (ref 132–146)
SPECIMEN TYPE: NORMAL
TRIGLYCERIDES FLUID: 26 MG/DL
WBC # FLD: 772 CELLS/UL
WBC OTHER # BLD: 5.7 K/UL (ref 4.5–11.5)

## 2024-10-24 PROCEDURE — 82378 CARCINOEMBRYONIC ANTIGEN: CPT

## 2024-10-24 PROCEDURE — 32555 ASPIRATE PLEURA W/ IMAGING: CPT

## 2024-10-24 PROCEDURE — 84157 ASSAY OF PROTEIN OTHER: CPT

## 2024-10-24 PROCEDURE — 80053 COMPREHEN METABOLIC PANEL: CPT

## 2024-10-24 PROCEDURE — 2580000003 HC RX 258: Performed by: INTERNAL MEDICINE

## 2024-10-24 PROCEDURE — 97165 OT EVAL LOW COMPLEX 30 MIN: CPT

## 2024-10-24 PROCEDURE — 82945 GLUCOSE OTHER FLUID: CPT

## 2024-10-24 PROCEDURE — 6370000000 HC RX 637 (ALT 250 FOR IP): Performed by: NURSE PRACTITIONER

## 2024-10-24 PROCEDURE — 83615 LACTATE (LD) (LDH) ENZYME: CPT

## 2024-10-24 PROCEDURE — 85025 COMPLETE CBC W/AUTO DIFF WBC: CPT

## 2024-10-24 PROCEDURE — 6370000000 HC RX 637 (ALT 250 FOR IP): Performed by: INTERNAL MEDICINE

## 2024-10-24 PROCEDURE — 84478 ASSAY OF TRIGLYCERIDES: CPT

## 2024-10-24 PROCEDURE — 71045 X-RAY EXAM CHEST 1 VIEW: CPT

## 2024-10-24 PROCEDURE — 97161 PT EVAL LOW COMPLEX 20 MIN: CPT

## 2024-10-24 PROCEDURE — 2060000000 HC ICU INTERMEDIATE R&B

## 2024-10-24 PROCEDURE — 88305 TISSUE EXAM BY PATHOLOGIST: CPT

## 2024-10-24 PROCEDURE — 89051 BODY FLUID CELL COUNT: CPT

## 2024-10-24 PROCEDURE — 0W993ZZ DRAINAGE OF RIGHT PLEURAL CAVITY, PERCUTANEOUS APPROACH: ICD-10-PCS | Performed by: INTERNAL MEDICINE

## 2024-10-24 PROCEDURE — 87070 CULTURE OTHR SPECIMN AEROBIC: CPT

## 2024-10-24 PROCEDURE — 88112 CYTOPATH CELL ENHANCE TECH: CPT

## 2024-10-24 PROCEDURE — 87205 SMEAR GRAM STAIN: CPT

## 2024-10-24 PROCEDURE — 6360000002 HC RX W HCPCS: Performed by: INTERNAL MEDICINE

## 2024-10-24 RX ORDER — DRONABINOL 2.5 MG/1
2.5 CAPSULE ORAL 2 TIMES DAILY
Status: DISCONTINUED | OUTPATIENT
Start: 2024-10-24 | End: 2024-10-25 | Stop reason: HOSPADM

## 2024-10-24 RX ADMIN — DRONABINOL 2.5 MG: 2.5 CAPSULE ORAL at 12:08

## 2024-10-24 RX ADMIN — Medication 2000 UNITS: at 08:31

## 2024-10-24 RX ADMIN — SODIUM CHLORIDE, PRESERVATIVE FREE 10 ML: 5 INJECTION INTRAVENOUS at 08:31

## 2024-10-24 RX ADMIN — FOLIC ACID 1 MG: 1 TABLET ORAL at 08:31

## 2024-10-24 RX ADMIN — DRONABINOL 2.5 MG: 2.5 CAPSULE ORAL at 22:20

## 2024-10-24 RX ADMIN — GABAPENTIN 300 MG: 300 CAPSULE ORAL at 22:20

## 2024-10-24 RX ADMIN — SODIUM CHLORIDE, PRESERVATIVE FREE 10 ML: 5 INJECTION INTRAVENOUS at 22:20

## 2024-10-24 RX ADMIN — GABAPENTIN 300 MG: 300 CAPSULE ORAL at 08:31

## 2024-10-24 RX ADMIN — ROSUVASTATIN CALCIUM 5 MG: 5 TABLET, FILM COATED ORAL at 22:19

## 2024-10-24 RX ADMIN — ENOXAPARIN SODIUM 40 MG: 100 INJECTION SUBCUTANEOUS at 08:31

## 2024-10-24 ASSESSMENT — PAIN SCALES - GENERAL
PAINLEVEL_OUTOF10: 0
PAINLEVEL_OUTOF10: 0

## 2024-10-24 NOTE — PROGRESS NOTES
Subjective:    The patient is awake and alert, sitting up in bed on 2L O2. Multiple family members present. Patient is sob while sitting up and worse with talking.  No problems overnight.  Denies chest pain, angina, or dyspnea. Has some discomfort in right side lower back. No nausea or vomiting. States he lost a total of 15 lbs since the beginning of October due to no appetite. He denies being nauseated, he just does not want to eat. He did eat breakfast here this morning and tolerated it well.     Objective:    /69   Pulse 81   Temp 99.7 °F (37.6 °C) (Axillary)   Resp 18   Ht 1.664 m (5' 5.5\")   Wt 76.9 kg (169 lb 8 oz)   SpO2 96%   BMI 27.78 kg/m²     General: Alert, oriented, no acute distress  HEENT: No thrush or mucositis, EOMI, PERRLA  Heart:  RRR, no murmurs, gallops, or rubs.  Lungs:  Mild rhonchi bilat bases. On 2L O2  Abd: BS present, nontender, nondistended, no masses  Extrem:  No clubbing, cyanosis, or edema  Lymphatics: No palpable adenopathy in cervical and supraclavicular regions  Skin: Intact, no petechia or purpura    CBC with Differential:    Lab Results   Component Value Date/Time    WBC 5.7 10/24/2024 05:00 AM    RBC 3.34 10/24/2024 05:00 AM    HGB 9.8 10/24/2024 05:00 AM    HCT 30.9 10/24/2024 05:00 AM     10/24/2024 05:00 AM    MCV 92.5 10/24/2024 05:00 AM    MCH 29.3 10/24/2024 05:00 AM    MCHC 31.7 10/24/2024 05:00 AM    RDW 14.4 10/24/2024 05:00 AM    LYMPHOPCT 19 10/24/2024 05:00 AM    MONOPCT 16 10/24/2024 05:00 AM    EOSPCT 6 10/24/2024 05:00 AM    BASOPCT 1 10/24/2024 05:00 AM    MONOSABS 0.93 10/24/2024 05:00 AM    LYMPHSABS 1.05 10/24/2024 05:00 AM    EOSABS 0.36 10/24/2024 05:00 AM    BASOSABS 0.03 10/24/2024 05:00 AM     CMP:    Lab Results   Component Value Date/Time     10/24/2024 05:00 AM    K 3.8 10/24/2024 05:00 AM     10/24/2024 05:00 AM    CO2 25 10/24/2024 05:00 AM    BUN 16 10/24/2024 05:00 AM    CREATININE 0.9 10/24/2024 05:00 AM    LABGLOM

## 2024-10-24 NOTE — PLAN OF CARE
Problem: Discharge Planning  Goal: Discharge to home or other facility with appropriate resources  10/23/2024 2331 by Lisa Garcia RN  Outcome: Progressing  Flowsheets (Taken 10/23/2024 2059)  Discharge to home or other facility with appropriate resources:   Identify barriers to discharge with patient and caregiver   Arrange for needed discharge resources and transportation as appropriate  10/23/2024 1711 by Aaliyah Garcia RN  Outcome: Progressing     Problem: Safety - Adult  Goal: Free from fall injury  10/23/2024 2331 by Lisa Garcia RN  Outcome: Progressing  10/23/2024 1711 by Aaliyah Garcia RN  Outcome: Progressing     Problem: ABCDS Injury Assessment  Goal: Absence of physical injury  10/23/2024 2331 by Lisa Garcia RN  Outcome: Progressing  10/23/2024 1711 by Aaliyah Garcia RN  Outcome: Progressing     Problem: Respiratory - Adult  Goal: Achieves optimal ventilation and oxygenation  Outcome: Progressing     Problem: Cardiovascular - Adult  Goal: Maintains optimal cardiac output and hemodynamic stability  Outcome: Progressing  Goal: Absence of cardiac dysrhythmias or at baseline  Outcome: Progressing     Problem: Gastrointestinal - Adult  Goal: Maintains adequate nutritional intake  Outcome: Progressing     Problem: Safety - Adult  Goal: Free from fall injury  10/23/2024 2331 by Lisa Garcia RN  Outcome: Progressing  10/23/2024 1711 by Aaliyah Garcia RN  Outcome: Progressing     Problem: ABCDS Injury Assessment  Goal: Absence of physical injury  10/23/2024 2331 by Lisa Garcia RN  Outcome: Progressing     Problem: ABCDS Injury Assessment  Goal: Absence of physical injury  10/23/2024 2331 by Lisa Garcia RN  Outcome: Progressing  10/23/2024 1711 by Aaliyah Garcia RN  Outcome: Progressing     Problem: Respiratory - Adult  Goal: Achieves optimal ventilation and oxygenation  Outcome: Progressing     Problem: Cardiovascular - Adult  Goal: Maintains optimal cardiac  output and hemodynamic stability  Outcome: Progressing     Problem: Cardiovascular - Adult  Goal: Absence of cardiac dysrhythmias or at baseline  Outcome: Progressing     Problem: Gastrointestinal - Adult  Goal: Maintains adequate nutritional intake  Outcome: Progressing

## 2024-10-24 NOTE — H&P
Internal Medicine History & Physical     Name: Terell Martinez  : 1945  Chief Complaint: Shortness of Breath (Sent over by [facility]. )  Primary Care Physician: Awadalla, Maged I, MD  Admission date: 10/23/2024  Date of service: 10/24/2024     History of Present Illness  Terell is a 79 y.o. year old male with a past medical history of lung cancer with brain metastasis.  He presented to the ER on 10/23 with complaints of shortness of breath and lethargy.  His symptoms have been constant and moderate to severe.  He reports generalized lack of appetite and persistent shortness of breath that worsens with activity.  He reports that he has really had no energy and these last few weeks and even has trouble walking from room to room in his small apartment.  He does report an occasional cough but denies any sputum production and does not really feel congested.  He denies any fever but does report constant chills.  Denies any nausea or vomiting.  Yesterday he went to see his oncologist for his regularly scheduled chemotherapy treatment.  He expresses that when they entered the room they did not like the way that he looked and wanted him sent to the ER for further evaluation.  Upon arrival to the ER he was noted to be slightly tachycardic with a heart rate of 102 and a temperature 99.1 °F.  He was noted to be hypoxic with an SpO2 of 88% on room air so was placed on 2L NC.  Lab work noted a potassium 3.1, chloride 95, BUN 18, creatinine 1.0, lactic acid 2.4, WBC 6.3 and hemoglobin 10.8.  CTA of the chest was obtained and showed no evidence of pulmonary embolism but noted a persistent right pleural nodularity and posterior medial right lung mass with moderate-sized pleural effusion.  Given the pleural effusion and hypoxia he was admitted for further evaluation and management.    Currently he is seen lying in bed awake and alert no distress.  He reports being very tired and generalized not feeling well.  He continues to report an  Pleural effusion on right [J90]        Plan  Right Pleural Effusion -- Likely Malignant  CTA chest noted  Tentatively planning for right thoracentesis 10/24   Pulmonary consult appreciated     Stage IV Lung cancer with brain metastasis  Last chemotherapy treatment 3 weeks ago -- was supposed to have treatment again 10/23 but held  CT head and CTA chest/abdomen/pelvis noted  Consult Oncology     Hypokalemia   K 3.1 on admission -- likely related to poor oral intakes  Continue to monitor BMP and supplement as indicated    Hyperlipidemia  Continue statin    Anorexia  Reports overall lack of appetite over the last several weeks  Initially had weight gain following cancer diagnosis but now down 18 pounds in the last month or so  Trial Marinol for appetite stimulant  Consult Dietitian for supplements    Hx Nicotine Dependence  Reports that he quit smoking earlier this year after he found out he had cancer  Ongoing cessation advised    Continue home medications.  PT/OT  Follow labs  DVT prophylaxis.  Please see orders for further management and care.   for discharge planning  Discharge plan: home when stable -- possibly tomorrow pending thoracentesis    The pertinent details of this case were discussed with Dr. Mcgovern.    Jodee Munoz, JANIE - CNP   10/24/2024  8:05 AM    NOTE:  This report was transcribed using voice recognition software.  Every effort was made to ensure accuracy; however, inadvertent computerized transcription errors may be present.    Addendum: I have personally participated in a face-to-face history and physical exam on the date of service with the patient. I have discussed the case with the nurse practitioner. I also participated in medical decision making on the date of service and I agree with all of the pertinent clinical information unless indicated in my editing of the note. I have reviewed and edited the note above based on my findings during my history, exam, and decision making

## 2024-10-24 NOTE — PROCEDURES
PROCEDURE NOTE  Date: 10/24/2024   Name: Terell Martinez  YOB: 1945    Thoracentesis    Date/Time: 10/24/2024 1:52 PM    Performed by: Warner Clark MD  Authorized by: Warner Clark MD  Consent: Verbal consent obtained. Written consent obtained.  Risks and benefits: risks, benefits and alternatives were discussed  Consent given by: patient  Patient understanding: patient states understanding of the procedure being performed  Patient consent: the patient's understanding of the procedure matches consent given  Procedure consent: procedure consent matches procedure scheduled  Relevant documents: relevant documents present and verified  Test results: test results available and properly labeled  Site marked: the operative site was marked  Imaging studies: imaging studies available  Patient identity confirmed: verbally with patient  Time out: Immediately prior to procedure a \"time out\" was called to verify the correct patient, procedure, equipment, support staff and site/side marked as required.  Procedure purpose: diagnostic and therapeutic  Indications: pleural effusion  Preparation: Patient was prepped and draped in the usual sterile fashion.  Local anesthesia used: yes  Anesthesia: local infiltration    Anesthesia:  Local anesthesia used: yes  Local Anesthetic: lidocaine 1% without epinephrine  Anesthetic total: 5 mL    Sedation:  Patient sedated: no    Preparation: skin prepped with chlorhexidine  Patient position: sitting  Ultrasound guidance: yes  Location: right posterior  Puncture method: over-the-needle catheter  Drainage amount: 700 ml  Drainage characteristics: serosanguinous  Patient tolerance: patient tolerated the procedure well with no immediate complications  Comments: USG marking done right lower chest lat scapular line. Finder needle introduced and yellowish colored fluid obtained. Thoracentesis catheter introduced and about 700 cc tea-colored fluid removed. Catheter removed and band aid

## 2024-10-24 NOTE — ACP (ADVANCE CARE PLANNING)
Advance Care Planning   Healthcare Decision Maker:    Primary Decision Maker: EhLena (Cousin) - Other Relative - [phone]    Secondary Decision Maker: Veronica Velasco - Friend - [phone]    Click here to complete Healthcare Decision Makers including selection of the Healthcare Decision Maker Relationship (ie \"Primary\").  Today we documented Decision Maker(s) consistent with Legal Next of Kin hierarchy.

## 2024-10-24 NOTE — PROGRESS NOTES
Occupational Therapy  OCCUPATIONAL THERAPY INITIAL EVALUATION  [facility]  [address], [place]    Date: 10/24/2024     Patient Name: Terell Martinez  MRN: [id number]  : 1945  Room: [address] [place] [address]    Evaluating OT: Gina Reyes, OTR/L - [id number]    Referring Provider: Nicolas Mcgovern DO   Specific Provider Orders/Date: \"OT eval and treat\" - 10/23/2024    Diagnosis: Pleural effusion on right [J90]      Pertinent Medical History: lung CA with mets to the brain and bone, arthritis, COPD, HLD, back surgery, L TKA     Precautions: fall risk    Assessment of Current Deficits:    [x] Functional mobility   [x]ADLs  [x] Strength               []Cognition   [x] Functional transfers   [x] IADLs         [x] Safety Awareness   [x]Endurance   [] Fine Coordination              [x] Balance      [] Vision/perception   []Sensation    []Gross Motor Coordination  [] ROM  [] Delirium                   [] Motor Control     OT PLAN OF CARE   OT POC is based on physician orders, patient diagnosis, and results of clinical assessment.  Frequency/Duration 2-5 days/week for 2 weeks PRN   Specific OT Treatment Interventions to Include:   * Instruction/training on adapted ADL techniques and AE recommendations to increase functional independence within precautions       * Training on energy conservation strategies, correct breathing pattern and techniques to improve independence/tolerance for self-care routine  * Functional transfer/mobility training/DME recommendations for increased independence, safety, and fall prevention  * Patient/Family education to increase follow through with safety techniques and functional independence  * Recommendation of environmental modifications for increased safety with functional transfers/mobility and ADLs  * Therapeutic exercise to improve motor endurance, ROM, and functional strength for ADLs/functional transfers  * Therapeutic activities  independence with ADLs and functional tasks.   Balance Sitting: independent  Standing: SBA  independent dynamic standing balance in order to increase safety during self care and functional tasks   Activity Tolerance No noted SOB, dizziness reported during functional tasks  Patient on 2L at 95-97%  Patient will demonstrate Good understanding of energy conservation techniques in order to increase functional independence   Visual/  Perceptual  Reading glasses     N/A   B UE Strength 4-/5  Patient will demonstrate 4/5 B UE strength in order to maximize independence with ADLs and functional tasks.     Additional Long-Term Goal: Patient will increase functional independence to PLOF in order to allow patient to live in least restrictive environment.      ROM: Additional Information:    R UE  WFL WFL  and FMC/dexterity noted during ADL tasks   L UE WFL WFL  and FMC/dexterity noted during ADL tasks     Hearing: WFL  Sensation: Patient did report numbness/tingling in B hands  Tone: WFL  Edema: none noted    Comments: RN approved patient's participation in OOB activities. Upon arrival, patient supine, family present. Patient was pleasant and cooperative, agreed to participation in OT evaluation. Patient participated in bed mobility, dressing, functional transfers and functional mobility with cues and assistance as needed. Patient was receptive to provided cues and education, all questions answered. At end of session, patient seated in armed chair with call light and phone within reach, family present and all lines and tubes intact. Alarm on. Patient would benefit from continued skilled OT to increase safety and independence with completion of ADL/IADL tasks for functional independence and quality of life.     Patient education provided regardin) OT role/purpose and plan of care 2)use of call light for assistance 3)safety during self care and functional tasks. Patient verbalized understanding.    Further skilled OT

## 2024-10-24 NOTE — PLAN OF CARE
Problem: Discharge Planning  Goal: Discharge to home or other facility with appropriate resources  10/24/2024 0931 by Charlene Moses RN  Outcome: Progressing  Flowsheets (Taken 10/24/2024 0830)  Discharge to home or other facility with appropriate resources: Identify barriers to discharge with patient and caregiver  10/23/2024 2331 by Lisa Garcia RN  Outcome: Progressing  Flowsheets (Taken 10/23/2024 2059)  Discharge to home or other facility with appropriate resources:   Identify barriers to discharge with patient and caregiver   Arrange for needed discharge resources and transportation as appropriate     Problem: Safety - Adult  Goal: Free from fall injury  10/24/2024 0931 by Charlene Moses RN  Outcome: Progressing  Flowsheets (Taken 10/24/2024 0830)  Free From Fall Injury: Instruct family/caregiver on patient safety  10/23/2024 2331 by Lisa Garcia RN  Outcome: Progressing     Problem: ABCDS Injury Assessment  Goal: Absence of physical injury  10/24/2024 0931 by Charlene Moses RN  Outcome: Progressing  Flowsheets (Taken 10/24/2024 0830)  Absence of Physical Injury: Implement safety measures based on patient assessment  10/23/2024 2331 by Lisa Garcia RN  Outcome: Progressing     Problem: Respiratory - Adult  Goal: Achieves optimal ventilation and oxygenation  10/24/2024 0931 by Charlene Moses RN  Outcome: Progressing  Flowsheets (Taken 10/24/2024 0830)  Achieves optimal ventilation and oxygenation: Assess for changes in respiratory status  10/23/2024 2331 by Lisa Garcia RN  Outcome: Progressing     Problem: Cardiovascular - Adult  Goal: Maintains optimal cardiac output and hemodynamic stability  10/24/2024 0931 by Charlene Moses RN  Outcome: Progressing  Flowsheets (Taken 10/24/2024 0830)  Maintains optimal cardiac output and hemodynamic stability: Monitor blood pressure and heart rate  10/23/2024 2331 by Lisa Garcia RN  Outcome: Progressing  Goal: Absence of cardiac

## 2024-10-24 NOTE — PROGRESS NOTES
Physical Therapy  Facility/Department: [address] MED SURG  Physical Therapy Initial Assessment    Name: Terell Martinez  : 1945  MRN: [id number]  Date of Service: 10/24/2024        Patient Diagnosis(es): The primary encounter diagnosis was Malignant neoplasm of lower lobe of right lung (HCC). Diagnoses of Shortness of breath, Pleural effusion, and Pleural effusion on right were also pertinent to this visit.  Past Medical History:  has a past medical history of Adenocarcinoma, lung, right (HCC), Arthritis, Cancer (HCC), COPD (chronic obstructive pulmonary disease) (HCC), Hyperlipidemia, Secondary cancer of bone (HCC), Secondary cancer of brain (HCC), and Status post stereotactic radiosurgery.  Past Surgical History:  has a past surgical history that includes CT NEEDLE BIOPSY LUNG PERCUTANEOUS (2024); Cataract extraction (Bilateral, 2024); back surgery (); and Total knee arthroplasty (Left).      Evaluating Therapist: Chelsey Harding PT    Room #:  0602/0602-A  Diagnosis:  Pleural effusion on right [J90]  PMHx/PSHx:  COPD, Lung CA, mets to bone and prain  Precautions:  falls, O2, alarm      Social:  Pt lives alone in a 1 floor plan 1 steps  to enter. Laundry in basement  Prior to admission independent without device     Initial Evaluation  Date: 10/24/24 Treatment      Short Term/ Long Term   Goals   Was pt agreeable to Eval/treatment? yes     Does pt have pain? None reorted     Bed Mobility  Rolling: independent  Supine to sit: supervision  Sit to supine: NT  Scooting: supervision  independent   Transfers Sit to stand: SBA  Stand to sit: SBA  Stand pivot: SBA  independent   Ambulation    50 feet with no device CGA/SBA (CGA secondary to c/o dizziness)  100 feet with no device independent   Stair Negotiation  Ascended and descended  NT   4 -12 steps with 1 rail with independent   LE strength     3+/5    4/5   balance     Independent sitting  SB to CG standing     AM-PAC Raw score                        Pt  goals are located in above grid    Frequency of treatments: 2-5x/week x 3-5 days.    Time in  1050  Time out  1103      Evaluation Time includes thorough review of current medical information, gathering information on past medical history/social history and prior level of function, completion of standardized testing/informal observation of tasks, assessment of data and education on plan of care and goals.      CPT codes:  [x] Low Complexity PT evaluation 49725  [] Moderate Complexity PT evaluation 53558  [] High Complexity PT evaluation 97240  [] PT Re-evaluation 13193  [] Gait training 08938 minutes  [] Manual therapy 35415 minutes  [] Therapeutic activities 95817 minutes  [] Therapeutic exercises 58030 minutes  [] Neuromuscular reeducation 32470 minutes     Chelsey Harding PT [id number]

## 2024-10-24 NOTE — CONSULTS
[facility]  [address], [address]  [place] [address]    Pulmonary Consultation      Reason for Consult:  sob and weakness    Requesting Physician:  Awadalla, Maged I, MD    CHIEF COMPLAINT:  sob and weakness    History Obtained From:  patient    HISTORY OF PRESENT ILLNESS:                The patient is a 79 y.o. male with significant past medical history of adenoca on chemotherapy who presents with increased weakness and sob for the past few weeks, had chemotherapy (?) immunotherapy prior to that. Gradually getting worse, went to oncology yesterday for next treatment but noticed condition and sent to hospital. Currently lying down in bed on 2 li NC, (?) complaining more of weakness, some sob, no cough. Had completed radiation treatment to brain in September.    Past Medical History:        Diagnosis Date    Adenocarcinoma, lung, right (HCC) 04/05/2024    Arthritis     Cancer (HCC)     COPD (chronic obstructive pulmonary disease) (HCC)     Hyperlipidemia     Secondary cancer of bone (HCC) 04/19/2024    T9 lytic lesion on PET/CT    Secondary cancer of brain (HCC) 08/28/2024    Status post stereotactic radiosurgery 09/10/2024    S/p SRS to left anterior frontal metastasis.       Past Surgical History:        Procedure Laterality Date    BACK SURGERY  2005    CATARACT EXTRACTION Bilateral 02/2024 2/22/2024  left eye and 2/29/2024 right eye.    CT NEEDLE BIOPSY LUNG PERCUTANEOUS  04/05/2024    CT NEEDLE BIOPSY LUNG PERCUTANEOUS 4/5/2024 [facility] CT    TOTAL KNEE ARTHROPLASTY Left        Current Medications:    Current Facility-Administered Medications: melatonin tablet 3 mg, 3 mg, Oral, Nightly PRN  sodium chloride flush 0.9 % injection 10 mL, 10 mL, IntraVENous, 2 times per day  sodium chloride flush 0.9 % injection 10 mL, 10 mL, IntraVENous, PRN  0.9 % sodium chloride infusion, , IntraVENous, PRN  potassium chloride (KLOR-CON M) extended release  oral pharynx with moist mucus membranes, tonsils without erythema or exudates, gums normal and good dentition.  LUNGS:  minimal ronchi bibasal  CARDIOVASCULAR:  Normal apical impulse, regular rate and rhythm, normal S1 and S2, no S3 or S4, and no murmur noted  ABDOMEN:  No scars, normal bowel sounds, soft, non-distended, non-tender, no masses palpated, no hepatosplenomegally  MUSCULOSKELETAL:  There is no redness, warmth, or swelling of the joints.  Full range of motion noted.  NEUROLOGIC:  Awake, alert, oriented to name, place and time.  Cranial nerves II-XII are grossly intact.    DATA:    CBC:   Recent Labs     10/23/24  1132 10/24/24  0500   WBC 6.3 5.7   HGB 10.8* 9.8*   HCT 34.6* 30.9*   MCV 92.0 92.5   * 425       BMP:  Recent Labs     10/23/24  1132 10/24/24  0500    136   K 3.1* 3.8   CL 95* 101   CO2 25 25   BUN 18 16   CREATININE 1.0 0.9    ALB:3,BILIDIR:3,BILITOT:3,ALKPHOS:3)@    PT/INR:   Recent Labs     10/23/24  1132   PROTIME 16.4*   INR 1.6       ABG:   No results for input(s): \"PH\", \"PO2\", \"PCO2\", \"HCO3\", \"BE\", \"O2SAT\", \"METHB\", \"O2HB\", \"COHB\", \"O2CON\", \"HHB\", \"THB\" in the last 72 hours.          Radiology Review:  CTA chest reviewed with (-) PE and new mod right pleural effusion with congestion/atelectasis right lower lobe compared to previous    IMPRESSION/RECOMMENDATIONS:      Adenoca  Pleural effusion  Weakness/sob  hypoxia    Discussed thoracentesis, risks/benefits discussed, agreeable with procedure, will hold lovenox and schedule for later this afternoon and see if enough fluid present for procedure  Unclear if issues more due to weakness rather than sob, not usually on lung medications, not looking significantly sob, will give prn nebs only, observe need  Cont with oxygen, taper as tolerated  OOB to chair as tolerated      Time at the bedside, reviewing labs and radiographs, reviewing notes and consultations, discussing with staff and family was more than 55 minutes.    Thanks

## 2024-10-24 NOTE — CONSULTS
-4.8  Weight Adjustment For: No Adjustment     BMI Categories: Overweight (BMI 25.0-29.9)    Estimated Daily Nutrient Needs:  Energy Requirements Based On: Formula  Weight Used for Energy Requirements: Current  Energy (kcal/day): 9293-8424  Weight Used for Protein Requirements: Ideal  Protein (g/day): 80-90  Method Used for Fluid Requirements: 1 ml/kcal  Fluid (ml/day): 9402-3535 ml    Nutrition Diagnosis:   Moderate malnutrition, In context of acute illness or injury related to inadequate protein-energy intake as evidenced by Criteria as identified in malnutrition assessment    Nutrition Interventions:   Food and/or Nutrient Delivery: Continue Current Diet, Continue Oral Nutrition Supplement  Nutrition Education/Counseling: No recommendation at this time  Coordination of Nutrition Care: Continue to monitor while inpatient       Goals:     Goals: PO intake 75% or greater, by next RD assessment       Nutrition Monitoring and Evaluation:   Behavioral-Environmental Outcomes: None Identified  Food/Nutrient Intake Outcomes: Food and Nutrient Intake, Supplement Intake  Physical Signs/Symptoms Outcomes: Biochemical Data, GI Status, Fluid Status or Edema, Nutrition Focused Physical Findings, Skin, Weight    Discharge Planning:    Continue Oral Nutrition Supplement     JESSICA CORREA MPH, RD, LD  Contact: x [phone]

## 2024-10-24 NOTE — CARE COORDINATION
Introduced my self and provided explanation of CM role to patient, two friends and grandson at bedside. Admitted for right pleural effusion, SOB, Right lung CA. (Stage 5 w/brain mets). Follows at the [facility], actively receiving chemo, last tx 3 weeks ago. Palliative, Hem/Onc, Pulmonology are following. Plan for Right Thoracentesis today. [facility] [address] and Dr. Awadalla. Patient would like [facility], referral made to [facility] orders completed. Currently on 2L O2 nc, baseline is RA, nurse to assess ambulating pulse ox testing prior to discharge. He voices he resides alone in a 1 story home and completes his adl's with independence. No DME. He uses [facility]. Will continue to follow.  Katie CHN, RN  Case Management

## 2024-10-25 VITALS
TEMPERATURE: 98.9 F | HEART RATE: 89 BPM | BODY MASS INDEX: 27.26 KG/M2 | HEIGHT: 66 IN | WEIGHT: 169.6 LBS | DIASTOLIC BLOOD PRESSURE: 65 MMHG | OXYGEN SATURATION: 96 % | RESPIRATION RATE: 18 BRPM | SYSTOLIC BLOOD PRESSURE: 110 MMHG

## 2024-10-25 LAB
ALBUMIN SERPL-MCNC: 3 G/DL (ref 3.5–5.2)
ALP SERPL-CCNC: 65 U/L (ref 40–129)
ALT SERPL-CCNC: 115 U/L (ref 0–40)
ANION GAP SERPL CALCULATED.3IONS-SCNC: 11 MMOL/L (ref 7–16)
AST SERPL-CCNC: 114 U/L (ref 0–39)
BASOPHILS # BLD: 0.04 K/UL (ref 0–0.2)
BASOPHILS NFR BLD: 1 % (ref 0–2)
BILIRUB SERPL-MCNC: 0.6 MG/DL (ref 0–1.2)
BUN SERPL-MCNC: 14 MG/DL (ref 6–23)
CALCIUM SERPL-MCNC: 8.6 MG/DL (ref 8.6–10.2)
CHLORIDE SERPL-SCNC: 102 MMOL/L (ref 98–107)
CO2 SERPL-SCNC: 26 MMOL/L (ref 22–29)
CREAT SERPL-MCNC: 0.9 MG/DL (ref 0.7–1.2)
EKG ATRIAL RATE: 95 BPM
EKG P AXIS: 22 DEGREES
EKG P-R INTERVAL: 118 MS
EKG Q-T INTERVAL: 340 MS
EKG QRS DURATION: 84 MS
EKG QTC CALCULATION (BAZETT): 427 MS
EKG R AXIS: -9 DEGREES
EKG T AXIS: 30 DEGREES
EKG VENTRICULAR RATE: 95 BPM
EOSINOPHIL # BLD: 0.34 K/UL (ref 0.05–0.5)
EOSINOPHILS RELATIVE PERCENT: 6 % (ref 0–6)
ERYTHROCYTE [DISTWIDTH] IN BLOOD BY AUTOMATED COUNT: 14.3 % (ref 11.5–15)
GFR, ESTIMATED: 88 ML/MIN/1.73M2
GLUCOSE SERPL-MCNC: 106 MG/DL (ref 74–99)
HCT VFR BLD AUTO: 30.7 % (ref 37–54)
HGB BLD-MCNC: 9.7 G/DL (ref 12.5–16.5)
IMM GRANULOCYTES # BLD AUTO: 0.04 K/UL (ref 0–0.58)
IMM GRANULOCYTES NFR BLD: 1 % (ref 0–5)
LYMPHOCYTES NFR BLD: 0.99 K/UL (ref 1.5–4)
LYMPHOCYTES RELATIVE PERCENT: 18 % (ref 20–42)
MCH RBC QN AUTO: 28.7 PG (ref 26–35)
MCHC RBC AUTO-ENTMCNC: 31.6 G/DL (ref 32–34.5)
MCV RBC AUTO: 90.8 FL (ref 80–99.9)
MONOCYTES NFR BLD: 0.85 K/UL (ref 0.1–0.95)
MONOCYTES NFR BLD: 16 % (ref 2–12)
NEUTROPHILS NFR BLD: 58 % (ref 43–80)
NEUTS SEG NFR BLD: 3.15 K/UL (ref 1.8–7.3)
PLATELET # BLD AUTO: 449 K/UL (ref 130–450)
PMV BLD AUTO: 9.4 FL (ref 7–12)
POTASSIUM SERPL-SCNC: 3.9 MMOL/L (ref 3.5–5)
PROT SERPL-MCNC: 6.3 G/DL (ref 6.4–8.3)
RBC # BLD AUTO: 3.38 M/UL (ref 3.8–5.8)
SODIUM SERPL-SCNC: 139 MMOL/L (ref 132–146)
WBC OTHER # BLD: 5.4 K/UL (ref 4.5–11.5)

## 2024-10-25 PROCEDURE — 2580000003 HC RX 258: Performed by: INTERNAL MEDICINE

## 2024-10-25 PROCEDURE — 85025 COMPLETE CBC W/AUTO DIFF WBC: CPT

## 2024-10-25 PROCEDURE — 6370000000 HC RX 637 (ALT 250 FOR IP): Performed by: INTERNAL MEDICINE

## 2024-10-25 PROCEDURE — 93010 ELECTROCARDIOGRAM REPORT: CPT | Performed by: INTERNAL MEDICINE

## 2024-10-25 PROCEDURE — 80053 COMPREHEN METABOLIC PANEL: CPT

## 2024-10-25 PROCEDURE — 6370000000 HC RX 637 (ALT 250 FOR IP): Performed by: NURSE PRACTITIONER

## 2024-10-25 PROCEDURE — 2700000000 HC OXYGEN THERAPY PER DAY

## 2024-10-25 RX ORDER — DRONABINOL 2.5 MG/1
2.5 CAPSULE ORAL 2 TIMES DAILY
Qty: 20 CAPSULE | Refills: 0 | Status: SHIPPED | OUTPATIENT
Start: 2024-10-25 | End: 2024-10-26

## 2024-10-25 RX ADMIN — GABAPENTIN 300 MG: 300 CAPSULE ORAL at 08:54

## 2024-10-25 RX ADMIN — FOLIC ACID 1 MG: 1 TABLET ORAL at 08:54

## 2024-10-25 RX ADMIN — SODIUM CHLORIDE, PRESERVATIVE FREE 10 ML: 5 INJECTION INTRAVENOUS at 08:54

## 2024-10-25 RX ADMIN — Medication 2000 UNITS: at 08:54

## 2024-10-25 RX ADMIN — DRONABINOL 2.5 MG: 2.5 CAPSULE ORAL at 08:54

## 2024-10-25 ASSESSMENT — PAIN SCALES - GENERAL: PAINLEVEL_OUTOF10: 0

## 2024-10-25 NOTE — PROGRESS NOTES
Subjective:    The patient is awake and alert, sitting up in bed on 2L O2. Friend present in his room. States he does not feel that much better than yesterday. He is worried about possibly being discharged home today, stated he would feel better if he can be evaluated for one more night.  Denies chest pain, angina, dyspnea or abdominal discomfort. No nausea or vomiting. He is eating a little more day by day.       Objective:    /66   Pulse 83   Temp 98.2 °F (36.8 °C) (Oral)   Resp 16   Ht 1.664 m (5' 5.51\")   Wt 76.9 kg (169 lb 9.6 oz)   SpO2 96%   BMI 27.78 kg/m²     General: Alert, oriented, no acute distress  HEENT: No thrush or mucositis, EOMI, PERRLA  Heart:  RRR, no murmurs, gallops, or rubs.  Lungs:  Rhonchi bilat bases. On 2L O2  Abd: BS present, nontender, nondistended, no masses  Extrem:  No clubbing, cyanosis, or edema  Lymphatics: No palpable adenopathy in cervical and supraclavicular regions  Skin: Intact, no petechia or purpura    CBC with Differential:    Lab Results   Component Value Date/Time    WBC 5.4 10/25/2024 04:55 AM    RBC 3.38 10/25/2024 04:55 AM    HGB 9.7 10/25/2024 04:55 AM    HCT 30.7 10/25/2024 04:55 AM     10/25/2024 04:55 AM    MCV 90.8 10/25/2024 04:55 AM    MCH 28.7 10/25/2024 04:55 AM    MCHC 31.6 10/25/2024 04:55 AM    RDW 14.3 10/25/2024 04:55 AM    LYMPHOPCT 18 10/25/2024 04:55 AM    MONOPCT 16 10/25/2024 04:55 AM    EOSPCT 6 10/25/2024 04:55 AM    BASOPCT 1 10/25/2024 04:55 AM    MONOSABS 0.85 10/25/2024 04:55 AM    LYMPHSABS 0.99 10/25/2024 04:55 AM    EOSABS 0.34 10/25/2024 04:55 AM    BASOSABS 0.04 10/25/2024 04:55 AM     CMP:    Lab Results   Component Value Date/Time     10/25/2024 04:55 AM    K 3.9 10/25/2024 04:55 AM     10/25/2024 04:55 AM    CO2 26 10/25/2024 04:55 AM    BUN 14 10/25/2024 04:55 AM    CREATININE 0.9 10/25/2024 04:55 AM    LABGLOM 88 10/25/2024 04:55 AM    LABGLOM 71 04/04/2024 09:43 AM    GLUCOSE 106 10/25/2024 04:55 AM

## 2024-10-25 NOTE — CARE COORDINATION
Met with patient. S/p right thoracentesis 10/24, 700ml removed. Patient does qualify for home oxygen, DME order completed. Freedom of choice list provided. Referral made to [facility], they will deliver portable oxygen tank to the patient's room today. Discharge order noted, plan is Home with [facility], orders completed. [facility] with [facility] was notified of discharge, SOC will be 10/26/24. Patients friend will transport home.  Katie CHN, RN  Case Management

## 2024-10-25 NOTE — PROGRESS NOTES
Internal Medicine Progress Note    Patient's name: Terell Martinez  : 1945  Chief complaints (on day of admission): Shortness of Breath (Sent over by [facility]. )  Admission date: 10/23/2024  Date of service: 10/25/2024   Room: [address] MED SURG  Primary care physician: Awadalla, Maged I, MD  Reason for visit: Follow-up for right pleural effusion, hypoxia    Subjective  Terell is seen lying in bed asleep in no distress. He does easily awaken to voice. He reports being tired this morning. He feels like his breathing might be a little better however hasn't really been up moving much. He denies any nausea or vomiting however still not eating much. In discussion with nursing his oxygenation has been better since the thoracentesis -- plan to check ambulatory pulse ox today. No other issues or concerns from nursing.    Review of Systems  Full 10 point review of systems negative unless mentioned above.    Hospital Medications  Current Facility-Administered Medications   Medication Dose Route Frequency Provider Last Rate Last Admin    droNABinol (MARINOL) capsule 2.5 mg  2.5 mg Oral BID Jodee Munoz APRN - CNP   2.5 mg at 10/24/24 2220    melatonin tablet 3 mg  3 mg Oral Nightly PRN Nicolas Mcgovern, DO        sodium chloride flush 0.9 % injection 10 mL  10 mL IntraVENous 2 times per day Nicolas Mcgovern, DO   10 mL at 10/24/24 2220    sodium chloride flush 0.9 % injection 10 mL  10 mL IntraVENous PRN Nicolas Mcgovern, DO        0.9 % sodium chloride infusion   IntraVENous PRN Nicolas Mcgovern, DO        potassium chloride (KLOR-CON M) extended release tablet 40 mEq  40 mEq Oral PRN Nicolas Mcgovern, DO        Or    potassium bicarb-citric acid (EFFER-K) effervescent tablet 40 mEq  40 mEq Oral PRN Nicolas Mcgovern, DO        Or    potassium chloride 10 mEq/100 mL IVPB (Peripheral Line)  10 mEq IntraVENous PRN Nicolas Mcgovern, DO        magnesium sulfate 2000 mg in 50 mL IVPB premix  2,000 mg IntraVENous PRN Nicolas Mcgovern, DO   non-labored on 2L NC  Heart: regular rate and regular rhythm, S1S2 present, no murmur noted  Abdomen: soft, non-tender, non-distended, normal bowel sounds  Extremities: generalized weakness, no edema noted  Neurologic: following commands, speech is clear    Most Recent Labs  Lab Results   Component Value Date    WBC 5.4 10/25/2024    HGB 9.7 (L) 10/25/2024    HCT 30.7 (L) 10/25/2024     10/25/2024     10/25/2024    K 3.9 10/25/2024     10/25/2024    CREATININE 0.9 10/25/2024    BUN 14 10/25/2024    CO2 26 10/25/2024    GLUCOSE 106 (H) 10/25/2024     (H) 10/25/2024     (H) 10/25/2024    INR 1.6 10/23/2024    TSH 0.76 10/23/2024       XR CHEST PORTABLE   Final Result   1. No definite pneumothorax.   2. Interstitial densities throughout the lungs with linear densities at the   right lung base. Findings may be related to pulmonary edema or atypical   infection.   3. Small right pleural effusion.         CT HEAD WO CONTRAST   Final Result   Known lesions within the brain parenchyma are not well seen on this   examination.  No significant surrounding vasogenic edema.  Atrophy and   chronic changes seen within the brain with no acute intracranial abnormality.         CTA PULMONARY W CONTRAST   Final Result   There is no evidence of pulmonary arterial embolization.      Persistent right pleural nodularity and posteromedial right lung mass with   moderate-sized pleural effusion consistent with the known bronchogenic   carcinoma.      There is aorticopulmonary window lymph node enlargement and T9 and L1   sclerotic lesions worrisome for new metastases.      New extensive bilateral upper lobe lung cysts with peripheral ground-glass   interstitial lung disease since 04/19/2024.         CT ABDOMEN PELVIS W IV CONTRAST Additional Contrast? None   Final Result   1. Moderate to large size right pleural effusion with subtle irregularity of   the posterior basal parietal pleura as well as a 7 mm

## 2024-10-25 NOTE — PROGRESS NOTES
Perfect serve to primary team with the following message: Patient is planning on appealing discharge. States he did not see a physician today. Explained Dr. Carlson was in and he was seen by 2 NP. Still planning to appeal.     Electronically signed by Rosalie Osborn RN on 10/25/2024 at 1:41 PM

## 2024-10-25 NOTE — PROGRESS NOTES
[facility]  [address], [address]  [place] [id number]      Pulmonary Progress Note      SUBJECTIVE:  claims doing some better with breathing post-thoracentesis, on 2 li NC lying down in bed, some soreness/pain at thoracentesis site.    OBJECTIVE    Medications    Continuous Infusions:   sodium chloride         Scheduled Meds:   droNABinol  2.5 mg Oral BID    sodium chloride flush  10 mL IntraVENous 2 times per day    [Held by provider] enoxaparin  40 mg SubCUTAneous Daily    folic acid  1 mg Oral QAM    gabapentin  300 mg Oral BID    rosuvastatin  5 mg Oral Nightly    Vitamin D  2,000 Units Oral QAM       PRN Meds:melatonin, sodium chloride flush, sodium chloride, potassium chloride **OR** potassium alternative oral replacement **OR** potassium chloride, magnesium sulfate, ondansetron **OR** ondansetron, senna, acetaminophen **OR** acetaminophen, HYDROcodone-acetaminophen    Physical    VITALS:  /66   Pulse 83   Temp 98.2 °F (36.8 °C) (Oral)   Resp 16   Ht 1.664 m (5' 5.51\")   Wt 76.9 kg (169 lb 9.6 oz)   SpO2 96%   BMI 27.78 kg/m²     24HR INTAKE/OUTPUT:    No intake or output data in the 24 hours ending 10/25/24 0942    24HR PULSE OXIMETRY RANGE:    SpO2  Av.2 %  Min: 87 %  Max: 97 %    General appearance: alert, appears stated age, and cooperative  Lungs: rhonchi bibasilar  Heart: regular rate and rhythm, S1, S2 normal, no murmur, click, rub or gallop  Abdomen: soft, non-tender; bowel sounds normal; no masses,  no organomegaly  Extremities: extremities normal, atraumatic, no cyanosis or edema  Neurologic: Mental status: Alert, oriented, thought content appropriate    Data    CBC:   Recent Labs     10/23/24  1132 10/24/24  0500 10/25/24  0455   WBC 6.3 5.7 5.4   HGB 10.8* 9.8* 9.7*   HCT 34.6* 30.9* 30.7*   MCV 92.0 92.5 90.8   * 425 449       BMP:  Recent Labs     10/23/24  1132 10/24/24  0500 10/25/24  0455   NA

## 2024-10-25 NOTE — PLAN OF CARE
Problem: Discharge Planning  Goal: Discharge to home or other facility with appropriate resources  10/24/2024 2323 by Lisa Garcia RN  Outcome: Progressing  10/24/2024 0931 by Charlene Moses RN  Outcome: Progressing  Flowsheets (Taken 10/24/2024 0830)  Discharge to home or other facility with appropriate resources: Identify barriers to discharge with patient and caregiver     Problem: Safety - Adult  Goal: Free from fall injury  10/24/2024 2323 by Lisa Garcia RN  Outcome: Progressing  10/24/2024 0931 by Charlene Moses RN  Outcome: Progressing  Flowsheets (Taken 10/24/2024 0830)  Free From Fall Injury: Instruct family/caregiver on patient safety     Problem: ABCDS Injury Assessment  Goal: Absence of physical injury  10/24/2024 2323 by Lisa Garcia RN  Outcome: Progressing  10/24/2024 0931 by Charlene Moses RN  Outcome: Progressing  Flowsheets (Taken 10/24/2024 0830)  Absence of Physical Injury: Implement safety measures based on patient assessment     Problem: Respiratory - Adult  Goal: Achieves optimal ventilation and oxygenation  10/24/2024 2323 by Lisa Garcia RN  Outcome: Progressing  Flowsheets (Taken 10/24/2024 2210)  Achieves optimal ventilation and oxygenation:   Assess for changes in respiratory status   Position to facilitate oxygenation and minimize respiratory effort  10/24/2024 0931 by Charlene Moses RN  Outcome: Progressing  Flowsheets (Taken 10/24/2024 0830)  Achieves optimal ventilation and oxygenation: Assess for changes in respiratory status     Problem: Cardiovascular - Adult  Goal: Maintains optimal cardiac output and hemodynamic stability  10/24/2024 2323 by Lisa Garcia RN  Outcome: Progressing  Flowsheets (Taken 10/24/2024 2210)  Maintains optimal cardiac output and hemodynamic stability:   Monitor blood pressure and heart rate   Monitor urine output and notify Licensed Independent Practitioner for values outside of normal range   Assess for signs of  CEDRIC VALDES  Outcome: Progressing  10/24/2024 0931 by Charlene Moses RN  Outcome: Progressing  Flowsheets (Taken 10/24/2024 0830)  Maintains adequate nutritional intake: Monitor percentage of each meal consumed     Problem: Pain  Goal: Verbalizes/displays adequate comfort level or baseline comfort level  10/24/2024 2323 by Lisa Garcia RN  Outcome: Progressing  10/24/2024 0931 by Charlene Moses RN  Outcome: Progressing     Problem: Nutrition Deficit:  Goal: Optimize nutritional status  Outcome: Progressing     Problem: Chronic Conditions and Co-morbidities  Goal: Patient's chronic conditions and co-morbidity symptoms are monitored and maintained or improved  Outcome: Progressing

## 2024-10-25 NOTE — DISCHARGE SUMMARY
Internal Medicine Discharge Summary    NAME: Terell Martinez :  1945  MRN:  [id number] PCP:Awadalla, Maged I, MD    ADMITTED: 10/23/2024   DISCHARGED: 10/25/2024  6:12 PM    ADMITTING PHYSICIAN: Nicolas Mcgovern DO    PCP: Awadalla, Maged I, MD    CONSULTANT(S):   IP CONSULT TO INTERNAL MEDICINE  IP CONSULT TO PULMONOLOGY  IP CONSULT TO ONCOLOGY  IP CONSULT TO HOME CARE NEEDS  IP CONSULT TO DIETITIAN     ADMITTING DIAGNOSIS:   Pleural effusion on right [J90]     Please see H&P for further details    DISCHARGE DIAGNOSES:   Active Hospital Problems    Diagnosis     Moderate protein-calorie malnutrition (HCC) [E44.0]     Hyperlipidemia [E78.5]     COPD (chronic obstructive pulmonary disease) (HCC) [J44.9]     Metastasis to brain (HCC) [C79.31]     Status post stereotactic radiosurgery [Z92.3]     Adenocarcinoma, lung, right (HCC) [C34.91]     Pleural effusion on right [J90]        BRIEF HISTORY OF PRESENT ILLNESS: Terell Martinez is a 79 y.o. male patient of Awadalla, Maged I, MD who  has a past medical history of Adenocarcinoma, lung, right (HCC), Arthritis, Cancer (HCC), COPD (chronic obstructive pulmonary disease) (HCC), Hyperlipidemia, Secondary cancer of bone (HCC), Secondary cancer of brain (HCC), and Status post stereotactic radiosurgery. who originally had concerns including Shortness of Breath (Sent over by [facility] ). at presentation on 10/23/2024, and was found to have Pleural effusion on right [J90] after workup.    Please see H&P for further details.    HOSPITAL COURSE:   The patient presented to the hospital with the chief complaint of Shortness of Breath (Sent over by [facility] )  . The patient was admitted to the hospital.     Right Pleural Effusion -- Likely Malignant  CTA chest noted  S/p right thoracentesis 10/24 -- 700ml removed  Follow up cytology results  Pulmonary consult appreciated     Stage IV Lung cancer with brain metastasis  CT head and CTA chest/abdomen/pelvis noted  Started maintenance  Support Exception - unselect if not a suspected or confirmed emergency medical condition->Emergency Medical Condition (MA) FINDINGS: BRAIN/VENTRICLES: Generalized atrophy identified the brain.  Minimal low-attenuation areas seen within the periventricular and subcortical white matter to suggest chronic small vessel ischemic change.  Known metastatic lesions within the brain are not well seen on this unenhanced examination. There is no significant vasogenic edema.  There is no acute intracranial hemorrhage, mass effect or midline shift.  No abnormal extra-axial fluid collection.  The gray-white differentiation is maintained without evidence of an acute infarct.  There is no evidence of hydrocephalus. ORBITS: The visualized portion of the orbits demonstrate no acute abnormality. SINUSES: The visualized paranasal sinuses and mastoid air cells demonstrate no acute abnormality. SOFT TISSUES/SKULL:  No acute abnormality of the visualized skull or soft tissues.     Known lesions within the brain parenchyma are not well seen on this examination.  No significant surrounding vasogenic edema.  Atrophy and chronic changes seen within the brain with no acute intracranial abnormality.         DISPOSITION:  The patient's condition is fair.   The patient is being discharged to home    DISCHARGE MEDICATIONS:      Medication List        START taking these medications      droNABinol 2.5 MG capsule  Commonly known as: MARINOL  Take 1 capsule by mouth 2 times daily for 10 days. Max Daily Amount: 5 mg            CONTINUE taking these medications      folic acid 1 MG tablet  Commonly known as: FOLVITE     gabapentin 300 MG capsule  Commonly known as: NEURONTIN     HYDROcodone-acetaminophen 5-325 MG per tablet  Commonly known as: Norco  Take 1 tablet by mouth every 6 hours as needed for Pain for up to 15 days. Intended supply: 3 days. Take lowest dose possible to manage pain Max Daily Amount: 4 tablets     rosuvastatin 10 MG

## 2024-10-25 NOTE — PROGRESS NOTES
Pulse Ox was 88% on room air at rest.   Ambulated patient on room air  Oxygen satration was 79% while ambulating on room air.  Oxygen applied.  Recovery Pulse Ox was 90 % on 4 liters of oxygen while ambulating.

## 2024-10-25 NOTE — PROGRESS NOTES
Reached out to Dr. Stauffer about Pt concern about being discharged today. Requested to stay one more night. Awaiting response.

## 2024-10-26 RX ORDER — DRONABINOL 2.5 MG/1
2.5 CAPSULE ORAL 2 TIMES DAILY
Qty: 20 CAPSULE | Refills: 0 | Status: SHIPPED | OUTPATIENT
Start: 2024-10-26 | End: 2024-11-05

## 2024-10-27 LAB
MICROORGANISM SPEC CULT: NO GROWTH
MICROORGANISM SPEC CULT: NORMAL
MICROORGANISM SPEC CULT: NORMAL
MICROORGANISM/AGENT SPEC: NORMAL
SERVICE CMNT-IMP: NORMAL
SPECIMEN DESCRIPTION: NORMAL

## 2024-10-28 ENCOUNTER — HOSPITAL ENCOUNTER (INPATIENT)
Age: 79
LOS: 3 days | Discharge: NURSING, REHAB OR LONG-TERM CARE | End: 2024-10-31
Attending: STUDENT IN AN ORGANIZED HEALTH CARE EDUCATION/TRAINING PROGRAM | Admitting: INTERNAL MEDICINE
Payer: OTHER GOVERNMENT

## 2024-10-28 ENCOUNTER — APPOINTMENT (OUTPATIENT)
Dept: GENERAL RADIOLOGY | Age: 79
End: 2024-10-28
Payer: OTHER GOVERNMENT

## 2024-10-28 ENCOUNTER — APPOINTMENT (OUTPATIENT)
Dept: CT IMAGING | Age: 79
End: 2024-10-28
Payer: OTHER GOVERNMENT

## 2024-10-28 DIAGNOSIS — C79.31 METASTASIS TO BRAIN (HCC): ICD-10-CM

## 2024-10-28 DIAGNOSIS — J18.9 PNEUMONIA OF BOTH LUNGS DUE TO INFECTIOUS ORGANISM, UNSPECIFIED PART OF LUNG: Primary | ICD-10-CM

## 2024-10-28 DIAGNOSIS — Z51.5 PALLIATIVE CARE BY SPECIALIST: ICD-10-CM

## 2024-10-28 DIAGNOSIS — C34.91 ADENOCARCINOMA, LUNG, RIGHT (HCC): ICD-10-CM

## 2024-10-28 DIAGNOSIS — R79.89 ELEVATED LFTS: ICD-10-CM

## 2024-10-28 DIAGNOSIS — G89.3 PAIN DUE TO NEOPLASM: ICD-10-CM

## 2024-10-28 LAB
ALBUMIN SERPL-MCNC: 3.5 G/DL (ref 3.5–5.2)
ALP SERPL-CCNC: 77 U/L (ref 40–129)
ALT SERPL-CCNC: 181 U/L (ref 0–40)
ANION GAP SERPL CALCULATED.3IONS-SCNC: 11 MMOL/L (ref 7–16)
AST SERPL-CCNC: 149 U/L (ref 0–39)
BASOPHILS # BLD: 0.05 K/UL (ref 0–0.2)
BASOPHILS NFR BLD: 1 % (ref 0–2)
BILIRUB SERPL-MCNC: 0.7 MG/DL (ref 0–1.2)
BUN SERPL-MCNC: 20 MG/DL (ref 6–23)
CALCIUM SERPL-MCNC: 9 MG/DL (ref 8.6–10.2)
CHLORIDE SERPL-SCNC: 97 MMOL/L (ref 98–107)
CO2 SERPL-SCNC: 26 MMOL/L (ref 22–29)
CREAT SERPL-MCNC: 1.1 MG/DL (ref 0.7–1.2)
EOSINOPHIL # BLD: 0.36 K/UL (ref 0.05–0.5)
EOSINOPHILS RELATIVE PERCENT: 5 % (ref 0–6)
ERYTHROCYTE [DISTWIDTH] IN BLOOD BY AUTOMATED COUNT: 14.4 % (ref 11.5–15)
GFR, ESTIMATED: 65 ML/MIN/1.73M2
GLUCOSE SERPL-MCNC: 111 MG/DL (ref 74–99)
HCT VFR BLD AUTO: 32.2 % (ref 37–54)
HGB BLD-MCNC: 10.3 G/DL (ref 12.5–16.5)
IMM GRANULOCYTES # BLD AUTO: 0.11 K/UL (ref 0–0.58)
IMM GRANULOCYTES NFR BLD: 1 % (ref 0–5)
LYMPHOCYTES NFR BLD: 0.99 K/UL (ref 1.5–4)
LYMPHOCYTES RELATIVE PERCENT: 13 % (ref 20–42)
MCH RBC QN AUTO: 28.9 PG (ref 26–35)
MCHC RBC AUTO-ENTMCNC: 32 G/DL (ref 32–34.5)
MCV RBC AUTO: 90.4 FL (ref 80–99.9)
MICROORGANISM SPEC CULT: NORMAL
MICROORGANISM SPEC CULT: NORMAL
MONOCYTES NFR BLD: 1.03 K/UL (ref 0.1–0.95)
MONOCYTES NFR BLD: 13 % (ref 2–12)
NEUTROPHILS NFR BLD: 67 % (ref 43–80)
NEUTS SEG NFR BLD: 5.12 K/UL (ref 1.8–7.3)
NON-GYN CYTOLOGY REPORT: NORMAL
PLATELET # BLD AUTO: 485 K/UL (ref 130–450)
PMV BLD AUTO: 9.4 FL (ref 7–12)
POTASSIUM SERPL-SCNC: 4 MMOL/L (ref 3.5–5)
PROT SERPL-MCNC: 7.1 G/DL (ref 6.4–8.3)
RBC # BLD AUTO: 3.56 M/UL (ref 3.8–5.8)
SERVICE CMNT-IMP: NORMAL
SERVICE CMNT-IMP: NORMAL
SODIUM SERPL-SCNC: 134 MMOL/L (ref 132–146)
SPECIMEN DESCRIPTION: NORMAL
SPECIMEN DESCRIPTION: NORMAL
TROPONIN I SERPL HS-MCNC: 22 NG/L (ref 0–11)
TROPONIN I SERPL HS-MCNC: 23 NG/L (ref 0–11)
WBC OTHER # BLD: 7.7 K/UL (ref 4.5–11.5)

## 2024-10-28 PROCEDURE — 93005 ELECTROCARDIOGRAM TRACING: CPT

## 2024-10-28 PROCEDURE — 80053 COMPREHEN METABOLIC PANEL: CPT

## 2024-10-28 PROCEDURE — 70450 CT HEAD/BRAIN W/O DYE: CPT

## 2024-10-28 PROCEDURE — 6360000002 HC RX W HCPCS

## 2024-10-28 PROCEDURE — 71045 X-RAY EXAM CHEST 1 VIEW: CPT

## 2024-10-28 PROCEDURE — 84484 ASSAY OF TROPONIN QUANT: CPT

## 2024-10-28 PROCEDURE — 2700000000 HC OXYGEN THERAPY PER DAY

## 2024-10-28 PROCEDURE — 99285 EMERGENCY DEPT VISIT HI MDM: CPT

## 2024-10-28 PROCEDURE — 87040 BLOOD CULTURE FOR BACTERIA: CPT

## 2024-10-28 PROCEDURE — 2580000003 HC RX 258

## 2024-10-28 PROCEDURE — 72125 CT NECK SPINE W/O DYE: CPT

## 2024-10-28 PROCEDURE — 1200000000 HC SEMI PRIVATE

## 2024-10-28 PROCEDURE — 85025 COMPLETE CBC W/AUTO DIFF WBC: CPT

## 2024-10-28 RX ORDER — PIPERACILLIN SODIUM, TAZOBACTAM SODIUM 4; .5 G/20ML; G/20ML
4500 INJECTION, POWDER, LYOPHILIZED, FOR SOLUTION INTRAVENOUS EVERY 6 HOURS
Status: DISCONTINUED | OUTPATIENT
Start: 2024-10-29 | End: 2024-10-31 | Stop reason: HOSPADM

## 2024-10-28 RX ORDER — SENNOSIDES A AND B 8.6 MG/1
1 TABLET, FILM COATED ORAL DAILY PRN
Status: DISCONTINUED | OUTPATIENT
Start: 2024-10-28 | End: 2024-10-31 | Stop reason: HOSPADM

## 2024-10-28 RX ORDER — SODIUM CHLORIDE 9 MG/ML
INJECTION, SOLUTION INTRAVENOUS PRN
Status: DISCONTINUED | OUTPATIENT
Start: 2024-10-28 | End: 2024-10-31 | Stop reason: HOSPADM

## 2024-10-28 RX ORDER — POTASSIUM CHLORIDE 7.45 MG/ML
10 INJECTION INTRAVENOUS PRN
Status: DISCONTINUED | OUTPATIENT
Start: 2024-10-28 | End: 2024-10-31 | Stop reason: RX

## 2024-10-28 RX ORDER — SODIUM CHLORIDE 9 MG/ML
20 INJECTION, SOLUTION INTRAMUSCULAR; INTRAVENOUS; SUBCUTANEOUS EVERY 6 HOURS
Status: DISCONTINUED | OUTPATIENT
Start: 2024-10-29 | End: 2024-10-31 | Stop reason: HOSPADM

## 2024-10-28 RX ORDER — ACETAMINOPHEN 650 MG/1
650 SUPPOSITORY RECTAL EVERY 6 HOURS PRN
Status: DISCONTINUED | OUTPATIENT
Start: 2024-10-28 | End: 2024-10-31 | Stop reason: HOSPADM

## 2024-10-28 RX ORDER — SODIUM CHLORIDE 0.9 % (FLUSH) 0.9 %
10 SYRINGE (ML) INJECTION EVERY 12 HOURS SCHEDULED
Status: DISCONTINUED | OUTPATIENT
Start: 2024-10-28 | End: 2024-10-31 | Stop reason: HOSPADM

## 2024-10-28 RX ORDER — ONDANSETRON 2 MG/ML
4 INJECTION INTRAMUSCULAR; INTRAVENOUS EVERY 6 HOURS PRN
Status: DISCONTINUED | OUTPATIENT
Start: 2024-10-28 | End: 2024-10-31 | Stop reason: HOSPADM

## 2024-10-28 RX ORDER — FOLIC ACID 1 MG/1
1 TABLET ORAL EVERY MORNING
Status: DISCONTINUED | OUTPATIENT
Start: 2024-10-29 | End: 2024-10-31 | Stop reason: HOSPADM

## 2024-10-28 RX ORDER — MAGNESIUM SULFATE IN WATER 40 MG/ML
2000 INJECTION, SOLUTION INTRAVENOUS PRN
Status: DISCONTINUED | OUTPATIENT
Start: 2024-10-28 | End: 2024-10-31 | Stop reason: HOSPADM

## 2024-10-28 RX ORDER — DRONABINOL 2.5 MG/1
2.5 CAPSULE ORAL 2 TIMES DAILY
Status: DISCONTINUED | OUTPATIENT
Start: 2024-10-29 | End: 2024-10-31 | Stop reason: HOSPADM

## 2024-10-28 RX ORDER — ACETAMINOPHEN 325 MG/1
650 TABLET ORAL EVERY 6 HOURS PRN
Status: DISCONTINUED | OUTPATIENT
Start: 2024-10-28 | End: 2024-10-31 | Stop reason: HOSPADM

## 2024-10-28 RX ORDER — SODIUM CHLORIDE 0.9 % (FLUSH) 0.9 %
10 SYRINGE (ML) INJECTION PRN
Status: DISCONTINUED | OUTPATIENT
Start: 2024-10-28 | End: 2024-10-31 | Stop reason: HOSPADM

## 2024-10-28 RX ORDER — POTASSIUM CHLORIDE 1500 MG/1
40 TABLET, EXTENDED RELEASE ORAL PRN
Status: DISCONTINUED | OUTPATIENT
Start: 2024-10-28 | End: 2024-10-31 | Stop reason: HOSPADM

## 2024-10-28 RX ORDER — HYDROCODONE BITARTRATE AND ACETAMINOPHEN 5; 325 MG/1; MG/1
1 TABLET ORAL EVERY 6 HOURS PRN
Status: DISCONTINUED | OUTPATIENT
Start: 2024-10-28 | End: 2024-10-31 | Stop reason: HOSPADM

## 2024-10-28 RX ORDER — ONDANSETRON 4 MG/1
4 TABLET, ORALLY DISINTEGRATING ORAL EVERY 8 HOURS PRN
Status: DISCONTINUED | OUTPATIENT
Start: 2024-10-28 | End: 2024-10-31 | Stop reason: HOSPADM

## 2024-10-28 RX ORDER — ROSUVASTATIN CALCIUM 5 MG/1
5 TABLET, COATED ORAL NIGHTLY
Status: DISCONTINUED | OUTPATIENT
Start: 2024-10-28 | End: 2024-10-31 | Stop reason: HOSPADM

## 2024-10-28 RX ORDER — ENOXAPARIN SODIUM 100 MG/ML
40 INJECTION SUBCUTANEOUS DAILY
Status: DISCONTINUED | OUTPATIENT
Start: 2024-10-29 | End: 2024-10-31 | Stop reason: HOSPADM

## 2024-10-28 RX ORDER — GABAPENTIN 300 MG/1
300 CAPSULE ORAL 2 TIMES DAILY
Status: DISCONTINUED | OUTPATIENT
Start: 2024-10-28 | End: 2024-10-31 | Stop reason: HOSPADM

## 2024-10-28 RX ADMIN — PIPERACILLIN AND TAZOBACTAM 4500 MG: 4; .5 INJECTION, POWDER, FOR SOLUTION INTRAVENOUS at 22:46

## 2024-10-28 ASSESSMENT — PAIN DESCRIPTION - ORIENTATION: ORIENTATION: RIGHT

## 2024-10-28 ASSESSMENT — PAIN SCALES - GENERAL: PAINLEVEL_OUTOF10: 8

## 2024-10-28 ASSESSMENT — PAIN DESCRIPTION - LOCATION: LOCATION: BACK;NECK

## 2024-10-28 ASSESSMENT — PAIN - FUNCTIONAL ASSESSMENT: PAIN_FUNCTIONAL_ASSESSMENT: 0-10

## 2024-10-28 ASSESSMENT — PAIN DESCRIPTION - DESCRIPTORS: DESCRIPTORS: SORE

## 2024-10-28 NOTE — ED PROVIDER NOTES
personally performed and/or participated in the history, exam, medical decision making, and procedures and agree with all pertinent clinical information unless otherwise noted.      I have also reviewed and agree with the past medical, family and social history unless otherwise noted.  I personally reviewed any ECG performed and agree with the resident unless stated below.      I have discussed this patient in detail with the resident, and provided the instruction and education regarding the patient.  My findings/plan: Patient seen and evaluated, briefly this is an 79-year-old male with history of lung CA with mets to the bone who is presenting for evaluation of shortness of breath and fall.  Patient notes he is currently undergoing chemotherapy, has a port to left chest.  Notes that he was recently admitted and discharged home.  Since being home, lives by himself he had a hard time ambulating moving about.  Notes that this evening when he was try to get into his bed being up falling and hit his head.  No LOC.  Feels that he cannot live on his own, will require at least minimal rehab versus nursing home placement.  On examination he is laying in bed in acute distress.  Heart regular rate and rhythm  Lungs look auscultation bilaterally anteriorly.  Small abrasion noted to the top of the head, pupils are equal round reactive to light, prior cataract surgery noted.  Plan for labs, imaging, supportive care with admission.       [BB]   2236 EKG shows normal sinus rhythm at a rate of 82, normal NJ interval, normal QRS, QTc 439, difficult to interpret related to a.m. 3 due to baseline however no obvious significant ST or T wave abnormalities [KM]      ED Course User Index  [BB] Jamel Walton DO  [KM] Kathe Almaguer MD      He is an 79-year-old male with a history of COPD, right lung adenocarcinoma on chemo, HLD presenting from home for shortness of breath, productive cough, fatigue.  The patient had recently been  admission and they are agreeable.  In addition the patient does feel that he needs 24/7 care and needs placement.  I have discussed this with the admitting team who does also agree to accept for admission at this time.      CONSULTS: (Who and What was discussed)  IP CONSULT TO INTERNAL MEDICINE  IP CONSULT TO SOCIAL WORK  IP CONSULT TO PHARMACY  IP CONSULT TO PULMONOLOGY  IP CONSULT TO ONCOLOGY      I am the Primary Clinician of Record.    FINAL IMPRESSION      1. Pneumonia of both lungs due to infectious organism, unspecified part of lung    2. Elevated LFTs          DISPOSITION/PLAN     DISPOSITION Admitted 10/28/2024 10:33:50 PM      PATIENT REFERRED TO:  No follow-up provider specified.    DISCHARGE MEDICATIONS:  New Prescriptions    No medications on file       DISCONTINUED MEDICATIONS:  Discontinued Medications    No medications on file              (Please note that portions of this note were completed with a voice recognition program.  Efforts were made to edit the dictations but occasionally words are mis-transcribed.)    Kathe Almaguer MD (electronically signed)

## 2024-10-29 ENCOUNTER — APPOINTMENT (OUTPATIENT)
Dept: CT IMAGING | Age: 79
End: 2024-10-29
Payer: OTHER GOVERNMENT

## 2024-10-29 LAB
ALBUMIN SERPL-MCNC: 3 G/DL (ref 3.5–5.2)
ALP SERPL-CCNC: 64 U/L (ref 40–129)
ALT SERPL-CCNC: 143 U/L (ref 0–40)
ANION GAP SERPL CALCULATED.3IONS-SCNC: 12 MMOL/L (ref 7–16)
AST SERPL-CCNC: 104 U/L (ref 0–39)
B PARAP IS1001 DNA NPH QL NAA+NON-PROBE: NOT DETECTED
B PERT DNA SPEC QL NAA+PROBE: NOT DETECTED
BASOPHILS # BLD: 0.04 K/UL (ref 0–0.2)
BASOPHILS NFR BLD: 1 % (ref 0–2)
BILIRUB SERPL-MCNC: 0.7 MG/DL (ref 0–1.2)
BUN SERPL-MCNC: 18 MG/DL (ref 6–23)
C PNEUM DNA NPH QL NAA+NON-PROBE: NOT DETECTED
CALCIUM SERPL-MCNC: 8.7 MG/DL (ref 8.6–10.2)
CHLORIDE SERPL-SCNC: 99 MMOL/L (ref 98–107)
CO2 SERPL-SCNC: 25 MMOL/L (ref 22–29)
CREAT SERPL-MCNC: 1.1 MG/DL (ref 0.7–1.2)
CRP SERPL HS-MCNC: 50 MG/L (ref 0–5)
EKG ATRIAL RATE: 82 BPM
EKG P AXIS: 33 DEGREES
EKG P-R INTERVAL: 132 MS
EKG Q-T INTERVAL: 376 MS
EKG QRS DURATION: 88 MS
EKG QTC CALCULATION (BAZETT): 439 MS
EKG R AXIS: -12 DEGREES
EKG T AXIS: 40 DEGREES
EKG VENTRICULAR RATE: 82 BPM
EOSINOPHIL # BLD: 0.41 K/UL (ref 0.05–0.5)
EOSINOPHILS RELATIVE PERCENT: 6 % (ref 0–6)
ERYTHROCYTE [DISTWIDTH] IN BLOOD BY AUTOMATED COUNT: 14.3 % (ref 11.5–15)
ERYTHROCYTE [SEDIMENTATION RATE] IN BLOOD BY WESTERGREN METHOD: 86 MM/HR (ref 0–15)
FLUAV RNA NPH QL NAA+NON-PROBE: NOT DETECTED
FLUBV RNA NPH QL NAA+NON-PROBE: NOT DETECTED
GFR, ESTIMATED: 68 ML/MIN/1.73M2
GLUCOSE SERPL-MCNC: 100 MG/DL (ref 74–99)
HADV DNA NPH QL NAA+NON-PROBE: NOT DETECTED
HCOV 229E RNA NPH QL NAA+NON-PROBE: NOT DETECTED
HCOV HKU1 RNA NPH QL NAA+NON-PROBE: NOT DETECTED
HCOV NL63 RNA NPH QL NAA+NON-PROBE: NOT DETECTED
HCOV OC43 RNA NPH QL NAA+NON-PROBE: NOT DETECTED
HCT VFR BLD AUTO: 30.1 % (ref 37–54)
HGB BLD-MCNC: 9.6 G/DL (ref 12.5–16.5)
HMPV RNA NPH QL NAA+NON-PROBE: NOT DETECTED
HPIV1 RNA NPH QL NAA+NON-PROBE: NOT DETECTED
HPIV2 RNA NPH QL NAA+NON-PROBE: NOT DETECTED
HPIV3 RNA NPH QL NAA+NON-PROBE: NOT DETECTED
HPIV4 RNA NPH QL NAA+NON-PROBE: NOT DETECTED
IMM GRANULOCYTES # BLD AUTO: 0.1 K/UL (ref 0–0.58)
IMM GRANULOCYTES NFR BLD: 2 % (ref 0–5)
LYMPHOCYTES NFR BLD: 1.17 K/UL (ref 1.5–4)
LYMPHOCYTES RELATIVE PERCENT: 18 % (ref 20–42)
M PNEUMO DNA NPH QL NAA+NON-PROBE: NOT DETECTED
MCH RBC QN AUTO: 28.8 PG (ref 26–35)
MCHC RBC AUTO-ENTMCNC: 31.9 G/DL (ref 32–34.5)
MCV RBC AUTO: 90.4 FL (ref 80–99.9)
MONOCYTES NFR BLD: 0.92 K/UL (ref 0.1–0.95)
MONOCYTES NFR BLD: 14 % (ref 2–12)
NEUTROPHILS NFR BLD: 60 % (ref 43–80)
NEUTS SEG NFR BLD: 3.99 K/UL (ref 1.8–7.3)
PLATELET # BLD AUTO: 396 K/UL (ref 130–450)
PMV BLD AUTO: 9.4 FL (ref 7–12)
POTASSIUM SERPL-SCNC: 3.6 MMOL/L (ref 3.5–5)
PROCALCITONIN SERPL-MCNC: 0.13 NG/ML (ref 0–0.08)
PROT SERPL-MCNC: 6.4 G/DL (ref 6.4–8.3)
RBC # BLD AUTO: 3.33 M/UL (ref 3.8–5.8)
RSV RNA NPH QL NAA+NON-PROBE: NOT DETECTED
RV+EV RNA NPH QL NAA+NON-PROBE: NOT DETECTED
SARS-COV-2 RNA NPH QL NAA+NON-PROBE: NOT DETECTED
SODIUM SERPL-SCNC: 136 MMOL/L (ref 132–146)
SPECIMEN DESCRIPTION: NORMAL
WBC OTHER # BLD: 6.6 K/UL (ref 4.5–11.5)

## 2024-10-29 PROCEDURE — 0202U NFCT DS 22 TRGT SARS-COV-2: CPT

## 2024-10-29 PROCEDURE — 6370000000 HC RX 637 (ALT 250 FOR IP): Performed by: NURSE PRACTITIONER

## 2024-10-29 PROCEDURE — 71250 CT THORAX DX C-: CPT

## 2024-10-29 PROCEDURE — 85025 COMPLETE CBC W/AUTO DIFF WBC: CPT

## 2024-10-29 PROCEDURE — 85652 RBC SED RATE AUTOMATED: CPT

## 2024-10-29 PROCEDURE — 97535 SELF CARE MNGMENT TRAINING: CPT

## 2024-10-29 PROCEDURE — 1200000000 HC SEMI PRIVATE

## 2024-10-29 PROCEDURE — 87081 CULTURE SCREEN ONLY: CPT

## 2024-10-29 PROCEDURE — 2580000003 HC RX 258

## 2024-10-29 PROCEDURE — 84145 PROCALCITONIN (PCT): CPT

## 2024-10-29 PROCEDURE — 80053 COMPREHEN METABOLIC PANEL: CPT

## 2024-10-29 PROCEDURE — 2580000003 HC RX 258: Performed by: NURSE PRACTITIONER

## 2024-10-29 PROCEDURE — 93010 ELECTROCARDIOGRAM REPORT: CPT | Performed by: INTERNAL MEDICINE

## 2024-10-29 PROCEDURE — 97165 OT EVAL LOW COMPLEX 30 MIN: CPT

## 2024-10-29 PROCEDURE — 86140 C-REACTIVE PROTEIN: CPT

## 2024-10-29 PROCEDURE — 6360000002 HC RX W HCPCS: Performed by: NURSE PRACTITIONER

## 2024-10-29 PROCEDURE — 97161 PT EVAL LOW COMPLEX 20 MIN: CPT

## 2024-10-29 PROCEDURE — 2700000000 HC OXYGEN THERAPY PER DAY

## 2024-10-29 PROCEDURE — 6360000002 HC RX W HCPCS

## 2024-10-29 RX ADMIN — SODIUM CHLORIDE 20 ML: 9 INJECTION INTRAMUSCULAR; INTRAVENOUS; SUBCUTANEOUS at 16:50

## 2024-10-29 RX ADMIN — FOLIC ACID 1 MG: 1 TABLET ORAL at 10:14

## 2024-10-29 RX ADMIN — ENOXAPARIN SODIUM 40 MG: 100 INJECTION SUBCUTANEOUS at 10:13

## 2024-10-29 RX ADMIN — DRONABINOL 2.5 MG: 2.5 CAPSULE ORAL at 21:39

## 2024-10-29 RX ADMIN — SODIUM CHLORIDE 20 ML: 9 INJECTION INTRAMUSCULAR; INTRAVENOUS; SUBCUTANEOUS at 23:55

## 2024-10-29 RX ADMIN — PIPERACILLIN AND TAZOBACTAM 4500 MG: 4; .5 INJECTION, POWDER, FOR SOLUTION INTRAVENOUS at 12:17

## 2024-10-29 RX ADMIN — PIPERACILLIN AND TAZOBACTAM 4500 MG: 4; .5 INJECTION, POWDER, FOR SOLUTION INTRAVENOUS at 23:55

## 2024-10-29 RX ADMIN — ROSUVASTATIN CALCIUM 5 MG: 5 TABLET, FILM COATED ORAL at 21:39

## 2024-10-29 RX ADMIN — PIPERACILLIN AND TAZOBACTAM 4500 MG: 4; .5 INJECTION, POWDER, FOR SOLUTION INTRAVENOUS at 16:49

## 2024-10-29 RX ADMIN — SODIUM CHLORIDE 20 ML: 9 INJECTION INTRAMUSCULAR; INTRAVENOUS; SUBCUTANEOUS at 05:46

## 2024-10-29 RX ADMIN — PIPERACILLIN AND TAZOBACTAM 4500 MG: 4; .5 INJECTION, POWDER, FOR SOLUTION INTRAVENOUS at 05:45

## 2024-10-29 RX ADMIN — VANCOMYCIN HYDROCHLORIDE 1000 MG: 1 INJECTION, POWDER, LYOPHILIZED, FOR SOLUTION INTRAVENOUS at 16:53

## 2024-10-29 RX ADMIN — GABAPENTIN 300 MG: 300 CAPSULE ORAL at 10:14

## 2024-10-29 RX ADMIN — DRONABINOL 2.5 MG: 2.5 CAPSULE ORAL at 10:13

## 2024-10-29 RX ADMIN — GABAPENTIN 300 MG: 300 CAPSULE ORAL at 21:39

## 2024-10-29 RX ADMIN — SODIUM CHLORIDE 20 ML: 9 INJECTION INTRAMUSCULAR; INTRAVENOUS; SUBCUTANEOUS at 12:17

## 2024-10-29 RX ADMIN — SODIUM CHLORIDE, PRESERVATIVE FREE 10 ML: 5 INJECTION INTRAVENOUS at 21:39

## 2024-10-29 RX ADMIN — ROSUVASTATIN CALCIUM 5 MG: 5 TABLET, FILM COATED ORAL at 01:12

## 2024-10-29 RX ADMIN — SODIUM CHLORIDE, PRESERVATIVE FREE 10 ML: 5 INJECTION INTRAVENOUS at 01:13

## 2024-10-29 RX ADMIN — GABAPENTIN 300 MG: 300 CAPSULE ORAL at 01:12

## 2024-10-29 RX ADMIN — VANCOMYCIN HYDROCHLORIDE 1500 MG: 10 INJECTION, POWDER, LYOPHILIZED, FOR SOLUTION INTRAVENOUS at 02:39

## 2024-10-29 ASSESSMENT — PAIN SCALES - GENERAL: PAINLEVEL_OUTOF10: 5

## 2024-10-29 ASSESSMENT — PAIN DESCRIPTION - ORIENTATION: ORIENTATION: POSTERIOR

## 2024-10-29 ASSESSMENT — PAIN DESCRIPTION - PAIN TYPE: TYPE: ACUTE PAIN

## 2024-10-29 ASSESSMENT — PAIN DESCRIPTION - DESCRIPTORS: DESCRIPTORS: ACHING

## 2024-10-29 ASSESSMENT — PAIN DESCRIPTION - LOCATION: LOCATION: BACK

## 2024-10-29 ASSESSMENT — PAIN DESCRIPTION - FREQUENCY: FREQUENCY: CONTINUOUS

## 2024-10-29 ASSESSMENT — PAIN - FUNCTIONAL ASSESSMENT: PAIN_FUNCTIONAL_ASSESSMENT: PREVENTS OR INTERFERES SOME ACTIVE ACTIVITIES AND ADLS

## 2024-10-29 ASSESSMENT — PAIN DESCRIPTION - ONSET: ONSET: ON-GOING

## 2024-10-29 NOTE — ED NOTES
Confirmed patient hand-off with floor and nurse to nurse completed.   
ED to Inpatient Handoff Report    Notified floor that electronic handoff available and patient ready for transport to room 533 .    Safety Risks: Risk of falls    Patient in Restraints: no    Constant Observer or Patient : no    Telemetry Monitoring Ordered: Yes          Order to transfer to unit without monitor: NO    Last MEWS: 2 Time completed: 2248    Deterioration Index: 32.9    Vitals:    10/28/24 1859 10/28/24 2050 10/28/24 2214 10/28/24 2248   BP: 98/72 112/78 120/73 120/73   Pulse: (!) 108 83 80 77   Resp: 18 28 21 22   Temp: 98.3 °F (36.8 °C)   98.3 °F (36.8 °C)   TempSrc: Temporal   Oral   SpO2: 98% 100% 96% 98%   Weight: 73.9 kg (163 lb)      Height: 1.651 m (5' 5\")          Opportunity for questions and clarification was provided.    
7

## 2024-10-29 NOTE — PROGRESS NOTES
Renal Dose Adjustment Policy (Generic)     This patient is on medication that requires renal, weight, and/or indication dose adjustment.      Date Body Weight IBW  Adjusted BW SCr  CrCl Dialysis status   10/28/2024 73.9 kg (163 lb) Ideal body weight: 61.5 kg (135 lb 9.3 oz)  Adjusted ideal body weight: 66.5 kg (146 lb 8.8 oz) Serum creatinine: 1.1 mg/dL 10/28/24 2010  Estimated creatinine clearance: 50 mL/min N/a       Pharmacy has dose-adjusted the following medication(s):    Date Previous Order Adjusted Order   10/28/2024 Zosyn 3.375 g q8h   (Extended infusion) Zosyn 4.5 g q6h  (Intermittent Dosing)       These changes were made per protocol according to the [facility]   Automatic Renal Dose Adjustment Policy.     *Please note this dose may need readjusted if patient's condition changes.    Please contact pharmacy with any questions regarding these changes.    Nathaniel Romo RPH  10/28/2024  10:42 PM

## 2024-10-29 NOTE — PROGRESS NOTES
4 Eyes Skin Assessment     NAME:  Terell Martinez  YOB: 1945  MEDICAL RECORD NUMBER:  [id number]    The patient is being assessed for  Admission    I agree that at least one RN has performed a thorough Head to Toe Skin Assessment on the patient. ALL assessment sites listed below have been assessed.      Areas assessed by both nurses:    Head, Face, Ears, Shoulders, Back, Chest, Arms, Elbows, Hands, Sacrum. Buttock, Coccyx, Ischium, and Legs. Feet and Heels        Does the Patient have a Wound? No noted wound(s)       Tello Prevention initiated by RN: No  Wound Care Orders initiated by RN: No    Pressure Injury (Stage 3,4, Unstageable, DTI, NWPT, and Complex wounds) if present, place Wound referral order by RN under : No    New Ostomies, if present place, Ostomy referral order under : No     Nurse 1 eSignature: Electronically signed by Alysia Reis RN on 10/29/24 at 3:37 AM EDT    **SHARE this note so that the co-signing nurse can place an eSignature**    Nurse 2 eSignature: {Esignature:[id number]}

## 2024-10-29 NOTE — PROGRESS NOTES
Physical Therapy  Facility/Department: [address] MED SURG/TELE  Physical Therapy Initial Assessment    Name: Terell Martinez  : 1945  MRN: [id number]  Date of Service: 10/29/2024    Patient Diagnosis(es): The primary encounter diagnosis was Pneumonia of both lungs due to infectious organism, unspecified part of lung. A diagnosis of Elevated LFTs was also pertinent to this visit.  Past Medical History:  has a past medical history of Adenocarcinoma, lung, right (HCC), Cancer (HCC), COPD (chronic obstructive pulmonary disease) (HCC), Hyperlipidemia, Secondary cancer of bone (HCC), Secondary cancer of brain (HCC), and Status post stereotactic radiosurgery.  Past Surgical History:  has a past surgical history that includes CT NEEDLE BIOPSY LUNG PERCUTANEOUS (2024); Cataract extraction (Bilateral, 2024); back surgery (); Total knee arthroplasty (Left); Colonoscopy; eye surgery; Tonsillectomy; hernia repair; and joint replacement.          Referring provider:  Keenan Stauffer MD    PT Order:  PT eval and treat     Evaluating PT:  Paradise Manzano, PT, DPT PT [id number]    Room #:  0533/0533-A  Diagnosis:  Pneumonia due to organism [J18.9]  Elevated LFTs [R79.89]  Pneumonia of both lungs due to infectious organism, unspecified part of lung [J18.9]  Precautions:  fall risk, O2, eyes sensitive to light.  Equipment Needs:  possible w/w    SUBJECTIVE:    Pt lives alone in a 1 story home with 1 stairs to enter.  Bed and bath is on first floor.  Pt ambulated with no device PTA.  Pt reported he had two falls recently.     OBJECTIVE:   Initial Evaluation  Date: 10/29 Treatment Short Term/ Long Term   Goals   Was pt agreeable to Eval/treatment? yes     Does pt have pain? None reported     Bed Mobility  Rolling: NT  Supine to sit: NT  Sit to supine: NT  Scooting: NT  Pt sitting EOB  independent   Transfers Sit to stand: SBA  Stand to sit: SBA  Stand pivot: NT  Modified independent   Ambulation    10 feet and 15 feet x 2 with w/w Min  A.   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       Vitals:  SPO2 ranged 94-96% on 2L during mobility.       Patient education  Pt educated on PT objectives during eval and while in the hospital, hand placement during transfers, calling for assistance, walker usage    Patient response to education:   Pt verbalized understanding Pt demonstrated skill Pt requires further education in this area   yes With cueing yes     ASSESSMENT:    Conditions Requiring Skilled Therapeutic Intervention:    [x]Decreased strength     []Decreased ROM  [x]Decreased functional mobility  [x]Decreased balance   [x]Decreased endurance   []Decreased posture  [x]Decreased sensation  []Decreased coordination   [x]Decreased vision  []Decreased safety awareness   []Increased pain       Comments:  Pt found sitting on the EOB.  Cueing required for hand placement during transfers.  Pt ambulates with slow gait speed. No LOB.  Pt reported feeling mildly lightheaded during mobility.  Cueing required for walker usage and safety.   At end of eval, pt left sitting on the EOB with call light in reach.      Pt's/ family goals   1. To get stronger    Prognosis is good for reaching above PT goals.    Patient and or family understand(s) diagnosis, prognosis, and plan of care.  yes    PHYSICAL THERAPY PLAN OF CARE:    PT POC is established based on physician order and patient diagnosis     Diagnosis:  Pneumonia due to organism [J18.9]  Elevated LFTs [R79.89]  Pneumonia of both lungs due to infectious organism, unspecified part of lung [J18.9]    Current Treatment

## 2024-10-29 NOTE — CARE COORDINATION
Initial CM Assessment-Met with patient bedside, introduced myself and CM role in discharge planning. Patient was discharged from [facility] 10/24, returned d/t inability to care for himself at home. Patient requested [facility], can accept unless he has Covid or IV antibiotics are Q6. No SNF can accept patients on Q6 IV antibiotics d/t IV fluid shortage. Unable to submit pre-cert until Covid test results are final and IV antibiotics are reconciled.     Ginette SWARTZ, RN  [facility]

## 2024-10-29 NOTE — PROGRESS NOTES
Subjective:    The patient is awake and alert, on 2L O2. States he was walking in his room and tripped over his bed frame.  He denies difficulty breathing, appears to be sob with conversation.     Objective:    BP (!) 104/57   Pulse 75   Temp 98.9 °F (37.2 °C) (Oral)   Resp 14   Ht 1.651 m (5' 5\")   Wt 75.2 kg (165 lb 12.6 oz)   SpO2 98%   BMI 27.59 kg/m²     General: NAD  HEENT: No thrush or mucositis, EOMI, PERRLA  Heart:  RRR, no murmurs, gallops, or rubs.  Lungs:  CTA bilaterally, no wheeze, rales or rhonchi  Abd: BS present, nontender, nondistended, no masses  Extrem:  No clubbing, cyanosis, or edema  Lymphatics: No palpable adenopathy in cervical and supraclavicular regions  Skin: Intact, no petechia or purpura    CBC with Differential:    Lab Results   Component Value Date/Time    WBC 6.6 10/29/2024 04:39 AM    RBC 3.33 10/29/2024 04:39 AM    HGB 9.6 10/29/2024 04:39 AM    HCT 30.1 10/29/2024 04:39 AM     10/29/2024 04:39 AM    MCV 90.4 10/29/2024 04:39 AM    MCH 28.8 10/29/2024 04:39 AM    MCHC 31.9 10/29/2024 04:39 AM    RDW 14.3 10/29/2024 04:39 AM    LYMPHOPCT 18 10/29/2024 04:39 AM    MONOPCT 14 10/29/2024 04:39 AM    EOSPCT 6 10/29/2024 04:39 AM    BASOPCT 1 10/29/2024 04:39 AM    MONOSABS 0.92 10/29/2024 04:39 AM    LYMPHSABS 1.17 10/29/2024 04:39 AM    EOSABS 0.41 10/29/2024 04:39 AM    BASOSABS 0.04 10/29/2024 04:39 AM     CMP:    Lab Results   Component Value Date/Time     10/29/2024 04:39 AM    K 3.6 10/29/2024 04:39 AM    CL 99 10/29/2024 04:39 AM    CO2 25 10/29/2024 04:39 AM    BUN 18 10/29/2024 04:39 AM    CREATININE 1.1 10/29/2024 04:39 AM    LABGLOM 68 10/29/2024 04:39 AM    LABGLOM 71 04/04/2024 09:43 AM    GLUCOSE 100 10/29/2024 04:39 AM    CALCIUM 8.7 10/29/2024 04:39 AM    BILITOT 0.7 10/29/2024 04:39 AM    ALKPHOS 64 10/29/2024 04:39 AM     10/29/2024 04:39 AM     10/29/2024 04:39 AM              Current Facility-Administered Medications:      droNABinol (MARINOL) capsule 2.5 mg, 2.5 mg, Oral, BID, Heather Rosales APRN - CNP    folic acid (FOLVITE) tablet 1 mg, 1 mg, Oral, QAM, Heather Rosales APRN - CNP    gabapentin (NEURONTIN) capsule 300 mg, 300 mg, Oral, BID, Bobby, Heather, APRN - CNP, 300 mg at 10/29/24 0112    HYDROcodone-acetaminophen (NORCO) 5-325 MG per tablet 1 tablet, 1 tablet, Oral, Q6H PRN, Heather Rosales, APRN - CNP    rosuvastatin (CRESTOR) tablet 5 mg, 5 mg, Oral, Nightly, Heather Rosales APRN - CNP, 5 mg at 10/29/24 0112    sodium chloride flush 0.9 % injection 10 mL, 10 mL, IntraVENous, 2 times per day, Heather Rosales, APRN - CNP, 10 mL at 10/29/24 0113    sodium chloride flush 0.9 % injection 10 mL, 10 mL, IntraVENous, PRN, Heather Rosales APRN - CNP    0.9 % sodium chloride infusion, , IntraVENous, PRN, Heather Rosales, APRN - CNP    potassium chloride (KLOR-CON M) extended release tablet 40 mEq, 40 mEq, Oral, PRN **OR** potassium bicarb-citric acid (EFFER-K) effervescent tablet 40 mEq, 40 mEq, Oral, PRN **OR** potassium chloride 10 mEq/100 mL IVPB (Peripheral Line), 10 mEq, IntraVENous, PRN, Heather Rosales APRN - CNP    magnesium sulfate 2000 mg in 50 mL IVPB premix, 2,000 mg, IntraVENous, PRN, Heather Rosales, APRN - CNP    enoxaparin (LOVENOX) injection 40 mg, 40 mg, SubCUTAneous, Daily, Heather Rosales APRN - CNP    ondansetron (ZOFRAN-ODT) disintegrating tablet 4 mg, 4 mg, Oral, Q8H PRN **OR** ondansetron (ZOFRAN) injection 4 mg, 4 mg, IntraVENous, Q6H PRN, Heather Rosales APRN - CNP    senna (SENOKOT) tablet 8.6 mg, 1 tablet, Oral, Daily PRN, Heather Rosales APRN - CNP    acetaminophen (TYLENOL) tablet 650 mg, 650 mg, Oral, Q6H PRN **OR** acetaminophen (TYLENOL) suppository 650 mg, 650 mg, Rectal, Q6H PRN, Heather Rosales APRN - CNP    piperacillin-tazobactam (ZOSYN) injection 4,500 mg, 4,500 mg, IntraVENous, Q6H, 4,500 mg at 10/29/24 0545 **AND** sodium chloride (PF) 0.9 % injection 20 mL, 20 mL,

## 2024-10-29 NOTE — PROGRESS NOTES
Occupational Therapy  OCCUPATIONAL THERAPY INITIAL EVALUATION  [facility]  [address] [place], [place]    Date: 10/29/2024     Patient Name: Terell (\"Serenity\") BHARAT Martinez  MRN: [id number]  : 1945  Room: [address] [place] [address]    Evaluating OT: Lola Lechuga, OTR/L - [id number]    Referring Provider: Heather Rosales APRN - CNP  Specific Provider Orders/Date: \"OT eval and treat\" - 10/28/2024    Diagnosis: Pneumonia due to organism [J18.9]  Elevated LFTs [R79.89]  Pneumonia of both lungs due to infectious organism, unspecified part of lung [J18.9]     Patient reported recent fall(s) at home.    Pertinent Medical History: lung cancer, COPD     Precautions: fall risk, O2 via nasal cannula    Assessment of Current Deficits:    [x] Functional mobility   [x] ADLs  [x] Strength               [] Cognition   [x] Functional transfers   [x] IADLs         [x] Safety Awareness   [x] Endurance   [] Fine Motor Coordination  [x] Balance      [] Vision/Perception   [x] Sensation    [] Gross Motor Coordination [] ROM          [] Delirium                  [] Motor Control     OT PLAN OF CARE   OT POC is based on physician orders, patient diagnosis, and results of clinical assessment.  Frequency/Duration 2-5 days/week for 2-4 weeks PRN   Specific OT Treatment Interventions to Include:   * Instruction/training on adapted ADL techniques and AE recommendations to increase functional independence within precautions       * Training on energy conservation strategies, correct breathing pattern and techniques to improve independence/tolerance for self-care routine  * Functional transfer/mobility training/DME recommendations for increased independence, safety, and fall prevention  * Patient/Family education to increase follow through with safety techniques and functional independence  * Recommendation of environmental modifications for increased safety with functional transfers/mobility and

## 2024-10-29 NOTE — CARE COORDINATION
Internal Medicine On-call Care Coordination Note    I was called by the ED physician because they recommended admission for this patient and we cover their PCP.  The history as I understand it after discussion with the ED physician is as follows:    Recent admit for pleural effusions  Presents today with fatigue, sob, cough, fall  Hx lung CA on treatment  Found to have pneumonia  Needs placement, lives at home alone  Given vanc and zosyn    I placed admission orders.  Including:    General admission orders  Reconciled home meds  IV zosyn and vanc  Oncology and pulmonology consults  PT/OT alfonzo BALDERAS for placement    Dr. Mcgovern, or our coverage will see the patient tomorrow for H&P.    Electronically signed by JANIE Gomez CNP on 10/28/2024 at 10:31 PM

## 2024-10-29 NOTE — CONSULTS
Pulmonary Consultation    Admit Date: 10/28/2024  Requesting Physician: Awadalla, Maged I, MD    Reason for consultation:  Pneumonia      HPI:  Patient is an 79 year old male who presents to emergency room with complaints of fatigue and a fall. He admits when he fell he did hit his chest \"right where the insertion site was from draining my lung.\" Patient did have a chest image that showed increasing right opacities. He denies any fevers. Patient had no leukocytosis. He was started on intravenous zosyn and given one dose of vancomycin.     Patient was recently admitted last week with a pleural effusion. He did have a thoracentesis completed 10/24/24 that showed exudate fluid and positive cytology for adenocarcinoma. He does follow with the [facility] for this. Patient does not use any supplemental oxygen at home.     Patient is seen on 2 L nasal cannula. He is comfortable. Admits to a cough that is non-productive. He does have dyspnea on exertion.       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     PSH:   Past Surgical History:   Procedure Laterality Date    BACK SURGERY  2005    CATARACT EXTRACTION Bilateral 02/2024 2/22/2024  left eye and 2/29/2024 right eye.    COLONOSCOPY      CT NEEDLE BIOPSY LUNG PERCUTANEOUS  04/05/2024    CT NEEDLE BIOPSY LUNG PERCUTANEOUS 4/5/2024 [facility] CT    EYE SURGERY      HERNIA REPAIR      JOINT REPLACEMENT      TONSILLECTOMY      TOTAL KNEE ARTHROPLASTY Left        Review of Systems:   Constitutional: As noted in the HPI.   Eyes: No visual changes or diplopia. No scleral icterus.  ENT: No headaches, hearing loss or  adenocarcinoma with possible re accumulation versus infectious process.     Plan:  Pro calcitonin   Check CT of the chest   Continue antibiotics for now  Possible pleurx catheter. Await Dr. Juarez opinion.       Thanks for letting us see this patient in consultation.  Total time in reviewing the previous admissions and records, reviewing the current x-rays, labs, and discussing with clinical staff including nursing and physicians, exceeded 50 minutes.      Please contact us with any questions. Office [phone] or after hours through [facility], x [phone].    Please note that voice recognition technology was used (while wearing a Covid mandated mask) in the preparation of this note and make therefore it may contain inadvertent transcription errors.  If the patient is a COVID 19 isolation patient, the above physical exam reflects that of the examining physician for the day.    JANIE Cowan-NP      [facility]    [facility], [address] [place] [address], [address], [place] [address]  Office Visits:  [address] [place] [address]

## 2024-10-29 NOTE — ACP (ADVANCE CARE PLANNING)
Advance Care Planning   Healthcare Decision Maker:    Primary Decision Maker: Lena Stauffer (Cousin) - Other Relative - [phone]    Secondary Decision Maker: Veronica Velasco - Friend - [phone]    Click here to complete Healthcare Decision Makers including selection of the Healthcare Decision Maker Relationship (ie \"Primary\").

## 2024-10-29 NOTE — PROGRESS NOTES
Comprehensive Nutrition Assessment    Type and Reason for Visit:  Initial, Positive Nutrition Screen    Nutrition Recommendations/Plan:   Continue current diet   Will add Ensure BID, chocolate preference per EMR review  Monitor      Malnutrition Assessment:  Malnutrition Status:  At risk for malnutrition (Comment) (10/29/24 1510)    Context:  Chronic Illness     Findings of the 6 clinical characteristics of malnutrition:  Energy Intake:  75% or less estimated energy requirements for 1 month or longer (suspected per EMR review, on Marinol)  Weight Loss:  Mild weight loss (specify amount and time period)     Body Fat Loss:  Unable to assess (pt sound asleep at time of room visit)     Muscle Mass Loss:  Unable to assess    Fluid Accumulation:  No significant fluid accumulation     Strength:  Not Performed    Nutrition Assessment:    Pt who recently discharged 10/25 presents w/ fatigue & fall, admits w/ PNA. Hx COPD, met lung CA on chemo. Will add ONS to optimzie nutrient intake.    Nutrition Related Findings:    A&O X4, I&Os not avail, no edema, abd rounded, hypo BS, reduced appetite (Marinol)   Wound Type: None       Current Nutrition Intake & Therapies:    Average Meal Intake: 26-50% (50% X 1 meal per intake clipboard)  Average Supplements Intake: None Ordered  ADULT DIET; Regular    Anthropometric Measures:  Height: 165.1 cm (5' 5\")  Ideal Body Weight (IBW): 136 lbs (62 kg)       Current Body Weight: 75.2 kg (165 lb 12.6 oz) (10/29), 121.9 % IBW. Weight Source: Not Specified  Current BMI (kg/m2): 27.6  Usual Body Weight: 79.5 kg (175 lb 3 oz) (4/2024 actual per EMR)  % Weight Change (Calculated): -5.4                    BMI Categories: Overweight (BMI 25.0-29.9)    Estimated Daily Nutrient Needs:  Energy Requirements Based On: Formula  Weight Used for Energy Requirements: Current  Energy (kcal/day):   Weight Used for Protein Requirements: Ideal  Protein (g/day): 80-95  Method Used for Fluid Requirements:

## 2024-10-29 NOTE — H&P
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       Past Surgical History:   Procedure Laterality Date    BACK SURGERY  2005    CATARACT EXTRACTION Bilateral 02/2024 2/22/2024  left eye and 2/29/2024 right eye.    COLONOSCOPY      CT NEEDLE BIOPSY LUNG PERCUTANEOUS  04/05/2024    CT NEEDLE BIOPSY LUNG PERCUTANEOUS 4/5/2024 [facility] CT    EYE SURGERY      HERNIA REPAIR      JOINT REPLACEMENT      TONSILLECTOMY      TOTAL KNEE ARTHROPLASTY Left        Family Medical History:  Family History   Problem Relation Age of Onset    Cancer Maternal Grandmother     Cancer Paternal Grandfather        No pertinent family medical history with regard to current issues.    Social History  Patient lives at home alone.   Illicit drug use- denies  TOBACCO:   reports that he has been smoking cigarettes. He started smoking about 64 years ago. He has a 63.4 pack-year smoking history. He has never used smokeless tobacco.  ETOH:   reports current alcohol use.    Home Medications  Prior to Admission medications    Medication Sig Start Date End Date Taking? Authorizing Provider   droNABinol (MARINOL) 2.5 MG capsule Take 1 capsule by mouth 2 times daily for 10 days. Max Daily Amount: 5 mg 10/26/24 11/5/24  Jodee Munoz APRN - CNP   rosuvastatin (CRESTOR) 10 MG tablet Take 0.5 tablets by mouth nightly    Provider, MD Kahlil   HYDROcodone-acetaminophen (NORCO) 5-325 MG per tablet Take 1 tablet by mouth every 6 hours as needed for Pain for up to 15 days. Intended supply: 3 days. Take lowest dose possible to manage pain Max Daily Amount: 4 tablets  Patient not taking: Reported on 10/29/2024  oral sores  Head: normocephalic, atraumatic  Neck: no JVD, no adenopathy, no thyromegaly, neck is supple, trachea is midline  Back: ROM normal, no CVA tenderness.  Chest: no pain on palpation  Lungs: diminished bs on the right base, without rhonchi, crackle, wheezing, or rale, no retractions or use of accessory muscles, on o2  Heart: regular rate and regular rhythm, no murmur, normal S1, S2  Abdomen: soft, non-tender; bowel sounds normal; no masses, no organomegaly  : Deferred   Extremities: no lower extremity edema, extremities atraumatic, no cyanosis, no clubbing, 2+ pedal pulses palpated  Skin: normal color, normal texture, normal turgor, no rashes, no lesions  Neurologic:5/5 muscle strength throughout, normal muscle tone throughout, face symmetric, hearing intact, tongue midline, speech appropriate without slurring, sensation to fine touch intact in upper and lower extremities    Labs-   Lab Results   Component Value Date    WBC 6.6 10/29/2024    HGB 9.6 (L) 10/29/2024    HCT 30.1 (L) 10/29/2024     10/29/2024     10/29/2024    K 3.6 10/29/2024    CL 99 10/29/2024    CREATININE 1.1 10/29/2024    BUN 18 10/29/2024    CO2 25 10/29/2024    GLUCOSE 100 (H) 10/29/2024     (H) 10/29/2024     (H) 10/29/2024    INR 1.6 10/23/2024     Lab Results   Component Value Date    CKTOTAL 77 10/23/2024       Echocardiogram:  NA    Recent Radiological Studies:  XR CHEST PORTABLE   Final Result   Interstitial and hazy opacities are present bilaterally notable in the lower   lung fields which may indicate interstitial pulmonary edema or bilateral   pneumonia.  Opacities appear to have increased in right lower lung field   compared with prior from October 24th.         CT HEAD WO CONTRAST   Final Result   No acute intracranial abnormality.         CT CERVICAL SPINE WO CONTRAST   Final Result   No acute abnormality of the cervical spine.             Assessment  Active Hospital Problems    Diagnosis

## 2024-10-30 LAB
ALBUMIN SERPL-MCNC: 2.7 G/DL (ref 3.5–5.2)
ALP SERPL-CCNC: 60 U/L (ref 40–129)
ALT SERPL-CCNC: 112 U/L (ref 0–40)
ANION GAP SERPL CALCULATED.3IONS-SCNC: 9 MMOL/L (ref 7–16)
AST SERPL-CCNC: 83 U/L (ref 0–39)
BASOPHILS # BLD: 0.05 K/UL (ref 0–0.2)
BASOPHILS NFR BLD: 1 % (ref 0–2)
BILIRUB SERPL-MCNC: 0.5 MG/DL (ref 0–1.2)
BUN SERPL-MCNC: 11 MG/DL (ref 6–23)
CALCIUM SERPL-MCNC: 8.8 MG/DL (ref 8.6–10.2)
CHLORIDE SERPL-SCNC: 106 MMOL/L (ref 98–107)
CO2 SERPL-SCNC: 24 MMOL/L (ref 22–29)
CREAT SERPL-MCNC: 1 MG/DL (ref 0.7–1.2)
EOSINOPHIL # BLD: 0.38 K/UL (ref 0.05–0.5)
EOSINOPHILS RELATIVE PERCENT: 6 % (ref 0–6)
ERYTHROCYTE [DISTWIDTH] IN BLOOD BY AUTOMATED COUNT: 14.1 % (ref 11.5–15)
GFR, ESTIMATED: 73 ML/MIN/1.73M2
GLUCOSE SERPL-MCNC: 93 MG/DL (ref 74–99)
HCT VFR BLD AUTO: 29.6 % (ref 37–54)
HGB BLD-MCNC: 9.1 G/DL (ref 12.5–16.5)
IMM GRANULOCYTES # BLD AUTO: 0.09 K/UL (ref 0–0.58)
IMM GRANULOCYTES NFR BLD: 1 % (ref 0–5)
LYMPHOCYTES NFR BLD: 1.02 K/UL (ref 1.5–4)
LYMPHOCYTES RELATIVE PERCENT: 16 % (ref 20–42)
MCH RBC QN AUTO: 28.6 PG (ref 26–35)
MCHC RBC AUTO-ENTMCNC: 30.7 G/DL (ref 32–34.5)
MCV RBC AUTO: 93.1 FL (ref 80–99.9)
MONOCYTES NFR BLD: 0.85 K/UL (ref 0.1–0.95)
MONOCYTES NFR BLD: 13 % (ref 2–12)
NEUTROPHILS NFR BLD: 62 % (ref 43–80)
NEUTS SEG NFR BLD: 3.96 K/UL (ref 1.8–7.3)
PLATELET # BLD AUTO: 382 K/UL (ref 130–450)
PMV BLD AUTO: 9.2 FL (ref 7–12)
POTASSIUM SERPL-SCNC: 3.9 MMOL/L (ref 3.5–5)
PROT SERPL-MCNC: 6.1 G/DL (ref 6.4–8.3)
RBC # BLD AUTO: 3.18 M/UL (ref 3.8–5.8)
SODIUM SERPL-SCNC: 139 MMOL/L (ref 132–146)
VANCOMYCIN SERPL-MCNC: 11.9 UG/ML (ref 5–40)
WBC OTHER # BLD: 6.4 K/UL (ref 4.5–11.5)

## 2024-10-30 PROCEDURE — 2580000003 HC RX 258: Performed by: NURSE PRACTITIONER

## 2024-10-30 PROCEDURE — 97535 SELF CARE MNGMENT TRAINING: CPT

## 2024-10-30 PROCEDURE — 80053 COMPREHEN METABOLIC PANEL: CPT

## 2024-10-30 PROCEDURE — 6370000000 HC RX 637 (ALT 250 FOR IP)

## 2024-10-30 PROCEDURE — 6360000002 HC RX W HCPCS: Performed by: NURSE PRACTITIONER

## 2024-10-30 PROCEDURE — 80202 ASSAY OF VANCOMYCIN: CPT

## 2024-10-30 PROCEDURE — 85025 COMPLETE CBC W/AUTO DIFF WBC: CPT

## 2024-10-30 PROCEDURE — 1200000000 HC SEMI PRIVATE

## 2024-10-30 PROCEDURE — 2700000000 HC OXYGEN THERAPY PER DAY

## 2024-10-30 PROCEDURE — 6370000000 HC RX 637 (ALT 250 FOR IP): Performed by: NURSE PRACTITIONER

## 2024-10-30 RX ORDER — LOPERAMIDE HYDROCHLORIDE 2 MG/1
2 CAPSULE ORAL 4 TIMES DAILY PRN
Status: DISCONTINUED | OUTPATIENT
Start: 2024-10-30 | End: 2024-10-31 | Stop reason: HOSPADM

## 2024-10-30 RX ORDER — LACTOBACILLUS RHAMNOSUS GG 10B CELL
1 CAPSULE ORAL
Status: DISCONTINUED | OUTPATIENT
Start: 2024-10-30 | End: 2024-10-30 | Stop reason: SDUPTHER

## 2024-10-30 RX ORDER — LACTOBACILLUS RHAMNOSUS GG 10B CELL
1 CAPSULE ORAL
Status: DISCONTINUED | OUTPATIENT
Start: 2024-10-30 | End: 2024-10-31 | Stop reason: HOSPADM

## 2024-10-30 RX ADMIN — SODIUM CHLORIDE 20 ML: 9 INJECTION INTRAMUSCULAR; INTRAVENOUS; SUBCUTANEOUS at 18:27

## 2024-10-30 RX ADMIN — GABAPENTIN 300 MG: 300 CAPSULE ORAL at 08:54

## 2024-10-30 RX ADMIN — SODIUM CHLORIDE, PRESERVATIVE FREE 10 ML: 5 INJECTION INTRAVENOUS at 21:26

## 2024-10-30 RX ADMIN — Medication 1 CAPSULE: at 18:30

## 2024-10-30 RX ADMIN — FOLIC ACID 1 MG: 1 TABLET ORAL at 08:54

## 2024-10-30 RX ADMIN — SODIUM CHLORIDE, PRESERVATIVE FREE 10 ML: 5 INJECTION INTRAVENOUS at 08:55

## 2024-10-30 RX ADMIN — ROSUVASTATIN CALCIUM 5 MG: 5 TABLET, FILM COATED ORAL at 21:24

## 2024-10-30 RX ADMIN — DRONABINOL 2.5 MG: 2.5 CAPSULE ORAL at 05:52

## 2024-10-30 RX ADMIN — SODIUM CHLORIDE 20 ML: 9 INJECTION INTRAMUSCULAR; INTRAVENOUS; SUBCUTANEOUS at 05:50

## 2024-10-30 RX ADMIN — GABAPENTIN 300 MG: 300 CAPSULE ORAL at 21:24

## 2024-10-30 RX ADMIN — SODIUM CHLORIDE 20 ML: 9 INJECTION INTRAMUSCULAR; INTRAVENOUS; SUBCUTANEOUS at 12:41

## 2024-10-30 RX ADMIN — VANCOMYCIN HYDROCHLORIDE 1000 MG: 1 INJECTION, POWDER, LYOPHILIZED, FOR SOLUTION INTRAVENOUS at 09:01

## 2024-10-30 RX ADMIN — ENOXAPARIN SODIUM 40 MG: 100 INJECTION SUBCUTANEOUS at 08:54

## 2024-10-30 RX ADMIN — PIPERACILLIN AND TAZOBACTAM 4500 MG: 4; .5 INJECTION, POWDER, FOR SOLUTION INTRAVENOUS at 12:41

## 2024-10-30 RX ADMIN — PIPERACILLIN AND TAZOBACTAM 4500 MG: 4; .5 INJECTION, POWDER, FOR SOLUTION INTRAVENOUS at 18:27

## 2024-10-30 RX ADMIN — PIPERACILLIN AND TAZOBACTAM 4500 MG: 4; .5 INJECTION, POWDER, FOR SOLUTION INTRAVENOUS at 05:50

## 2024-10-30 RX ADMIN — DRONABINOL 2.5 MG: 2.5 CAPSULE ORAL at 21:24

## 2024-10-30 ASSESSMENT — PAIN SCALES - GENERAL: PAINLEVEL_OUTOF10: 0

## 2024-10-30 NOTE — PROGRESS NOTES
Subjective:    The patient is awake and alert, sitting up in bed on 2L O2. Friend at the bedside. He states he is feeling better, feels like his breathing is starting to improve and he has eaten well so far today.  No problems overnight.  Denies chest pain, angina, dyspnea or abdominal discomfort. No nausea or vomiting. He had a couple episodes of diarrhea today but feels it is from his antibiotics.      Objective:    /70   Pulse 78   Temp 98.1 °F (36.7 °C) (Oral)   Resp 16   Ht 1.651 m (5' 5\")   Wt 75.4 kg (166 lb 3.6 oz)   SpO2 96%   BMI 27.66 kg/m²     General: Alert, oriented, no acute distress  HEENT: No thrush or mucositis, EOMI, PERRLA  Heart:  RRR, no murmurs, gallops, or rubs.  Lungs:  Diminished in all fields with faint rhonchi in left base.  Abd: BS present, nontender, nondistended, no masses  Extrem:  No clubbing, cyanosis, or edema  Lymphatics: No palpable adenopathy in cervical and supraclavicular regions  Skin: Intact, no petechia or purpura    CBC with Differential:    Lab Results   Component Value Date/Time    WBC 6.4 10/30/2024 04:50 AM    RBC 3.18 10/30/2024 04:50 AM    HGB 9.1 10/30/2024 04:50 AM    HCT 29.6 10/30/2024 04:50 AM     10/30/2024 04:50 AM    MCV 93.1 10/30/2024 04:50 AM    MCH 28.6 10/30/2024 04:50 AM    MCHC 30.7 10/30/2024 04:50 AM    RDW 14.1 10/30/2024 04:50 AM    LYMPHOPCT 16 10/30/2024 04:50 AM    MONOPCT 13 10/30/2024 04:50 AM    EOSPCT 6 10/30/2024 04:50 AM    BASOPCT 1 10/30/2024 04:50 AM    MONOSABS 0.85 10/30/2024 04:50 AM    LYMPHSABS 1.02 10/30/2024 04:50 AM    EOSABS 0.38 10/30/2024 04:50 AM    BASOSABS 0.05 10/30/2024 04:50 AM     CMP:    Lab Results   Component Value Date/Time     10/30/2024 04:50 AM    K 3.9 10/30/2024 04:50 AM     10/30/2024 04:50 AM    CO2 24 10/30/2024 04:50 AM    BUN 11 10/30/2024 04:50 AM    CREATININE 1.0 10/30/2024 04:50 AM    LABGLOM 73 10/30/2024 04:50 AM    LABGLOM 71 04/04/2024 09:43 AM    GLUCOSE 93  differentiated lung adenocarcinoma, lytic lesion met to T9, 5 mm ring-enhancing lesion with associated mild edema at the left junction of the frontal and parietal lobes suspicious of metastatic disease.   8/14 started maintenance pemetrexed and pembrolizumab, cycle # 7 was on 10/2.   9/10 SBRT left frontal lobe lesion  9/20 area of activity in the thoracic spine and right acetabulum suspicious for mets.   He presents to the ER from [facility] with sob, nausea, loss of appetite.  10/24 s/p thoracentesis 700 cc tea-colored fluid removed. Cytology, chem, culture pending  He now presents with fatigue after falling at home.    Plan:    - On 2L O2-baseline  - CBC with diff daily  - DVT prophylaxis with Lovenox  - Start probiotic daily  - Imodium PRN for diarrhea  - Antibiotics for pneumonia per primary, Zosyn  - Pulm following  - Continue supportive care  - Patient will need to reschedule cycle #8 at [facility].     Collaboration of care with Dr. Caceres    Electronically signed by JANIE Kate - CNP on 10/30/2024 at 8:31 AM

## 2024-10-30 NOTE — CARE COORDINATION
Transition of Care-[facility] accepted, pre-cert submitted yesterday. Patient remains on Vanco Q18 and Zosyn Q8, per attending-will not discharge on IV antibiotics. GERARDO, HENS and transport form created in soft chart.    Ginette CHN, RN  SEB

## 2024-10-30 NOTE — PROGRESS NOTES
Internal Medicine Progress Note    Patient's name: Terell Martinez  : 1945  Chief complaints (on day of admission): Fatigue (Pt discharge from hospital Friday and since has been fatigue had fall at home. Pt states hit head when fell does not take blood thinners ) and Shortness of Breath  Admission date: 10/28/2024  Date of service: 10/30/2024   Room: [address] MED SURG/TELE  Primary care physician: Awadalla, Maged I, MD  Reason for visit: Follow-up for pleural effusion    Subjective  Terell was seen and examined.  Patient states his breathing is stable today.  Starting to eat and drink a little better.  Still having a difficult time getting around secondary to weakness and fatigue.  Still somewhat short of breath at times.  Patient admits to a couple episodes of diarrhea.  States he gets like this with antibiotics at times.      Review of Systems  There are no new complaints of chest pain, shortness of breath, abdominal pain, nausea, vomiting, diarrhea, constipation.    Hospital Medications  Current Facility-Administered Medications   Medication Dose Route Frequency Provider Last Rate Last Admin    vancomycin 1000 mg IVPB in 250 mL NS addavial  1,000 mg IntraVENous Q18H Heather Rosales APRN - CNP   Stopped at 10/30/24 1014    droNABinol (MARINOL) capsule 2.5 mg  2.5 mg Oral BID Heather Rosales APRN - CNP   2.5 mg at 10/30/24 0552    folic acid (FOLVITE) tablet 1 mg  1 mg Oral QAM Heather Rosales APRN - CNP   1 mg at 10/30/24 0854    gabapentin (NEURONTIN) capsule 300 mg  300 mg Oral BID Heather Rosales APRN - CNP   300 mg at 10/30/24 0854    HYDROcodone-acetaminophen (NORCO) 5-325 MG per tablet 1 tablet  1 tablet Oral Q6H PRN Heather Rosales APRN - CNP        rosuvastatin (CRESTOR) tablet 5 mg  5 mg Oral Nightly Heather Rosales APRN - CNP   5 mg at 10/29/24 2139    sodium chloride flush 0.9 % injection 10 mL  10 mL IntraVENous 2 times per day Heather Rosales APRN - CNP   10 mL at 10/30/24 0855    sodium   Wt 75.4 kg (166 lb 3.6 oz)   SpO2 96%   BMI 27.66 kg/m²   I/O last 3 completed shifts:  In: 240 [P.O.:240]  Out: -   No intake/output data recorded.    Physical Exam:  General: AAO to person/place/time/purpose, NAD, mild labored breathing with any activity  Eyes: conjunctivae/corneas clear, sclera non icteric  Skin: color/texture/turgor normal, no rashes or lesions  Lungs: Diminished breath sounds with some rhonchi present at the left base posteriorly, no retractions/use of accessory muscles, no vocal fremitus, no crackle, no rales, on oxygen  Heart: regular rate, regular rhythm, no murmur  Abdomen: soft, NT, bowel sounds normal  Extremities: atraumatic, no edema  Neurologic: cranial nerves 2-12 grossly intact, no slurred speech    Most Recent Labs  Lab Results   Component Value Date    WBC 6.4 10/30/2024    HGB 9.1 (L) 10/30/2024    HCT 29.6 (L) 10/30/2024     10/30/2024     10/30/2024    K 3.9 10/30/2024     10/30/2024    CREATININE 1.0 10/30/2024    BUN 11 10/30/2024    CO2 24 10/30/2024    GLUCOSE 93 10/30/2024     (H) 10/30/2024    AST 83 (H) 10/30/2024    INR 1.6 10/23/2024    TSH 0.76 10/23/2024       CT CHEST WO CONTRAST   Final Result   1. Stable to slightly decreased volume of moderate volume right pleural   effusion with adjacent opacifications likely largely atelectatic however   intermixed airspace disease difficult to exclude without clear progression   from recent comparison.   2. Mixed interstitial edema pattern with septal thickening and mosaicism in   the mid and lower lungs predominantly with moderate volume persistent right   pleural effusion.   3. Cholelithiasis.         XR CHEST PORTABLE   Final Result   Interstitial and hazy opacities are present bilaterally notable in the lower   lung fields which may indicate interstitial pulmonary edema or bilateral   pneumonia.  Opacities appear to have increased in right lower lung field   compared with prior from October

## 2024-10-30 NOTE — PROGRESS NOTES
Pharmacy Consultation Note  (Antibiotic Dosing and Monitoring)    Initial consult date: 10/29  Consulting physician/provider: MAGNOLIA Rosales  Drug: Vancomycin  Indication: PNA    Age/  Gender Height Weight IBW  Allergy Information   79 y.o./male 165.1 cm (5' 5\") 73.9 kg (163 lb)     Ideal body weight: 61.5 kg (135 lb 9.3 oz)  Adjusted ideal body weight: 67.1 kg (147 lb 13.4 oz)   Patient has no known allergies.      Renal Function:  Recent Labs     10/28/24  2010 10/29/24  0439 10/30/24  0450   BUN 20 18 11   CREATININE 1.1 1.1 1.0     No intake or output data in the 24 hours ending 10/30/24 1419    Vancomycin Monitoring:  Trough:  No results for input(s): \"VANCOTROUGH\" in the last 72 hours.  Random:  No results for input(s): \"VANCORANDOM\" in the last 72 hours.    Vancomycin Administration Times:  Recent vancomycin administrations                     vancomycin 1000 mg IVPB in 250 mL NS addavial (mg) 1,000 mg New Bag 10/30/24 0901     1,000 mg New Bag 10/29/24 1653    vancomycin (VANCOCIN) 1,500 mg in sodium chloride 0.9 % 250 mL IVPB (mg) 1,500 mg New Bag 10/29/24 0239                    Assessment:  Patient is a 79 y.o. male who has been initiated on vancomycin  Estimated Creatinine Clearance: 56 mL/min (based on SCr of 1 mg/dL).  To dose vancomycin, pharmacy will be utilizing [employer] calculation software for goal AUC/TORREY 400-600 mg/L-hr (predicted AUC/TORREY = 487, Tr =13.8 mcg/mL)    Plan:  Will continue vancomycin 1000 mg IV every 18 hours  Will check vancomycin levels when appropriate  Will continue to monitor renal function   Pharmacy to follow      Bertha Barahona PharmD, BCPS 10/30/2024 2:19 PM   Ext: [phone]  BOBBI: [phone]  SEY: [phone]  SJW: [phone]

## 2024-10-30 NOTE — PROGRESS NOTES
ADLs/functional transfers  * Therapeutic activities to facilitate/challenge dynamic balance, stand tolerance for increased safety and independence with ADLs  * Neuro-muscular re-education: facilitation of righting/equilibrium reactions, midline orientation, scapular stability/mobility, normalization of muscle tone, and facilitation of volitional active controled movement  * Positioning to improve skin integrity, interaction with environment and functional independence     Recommended Adaptive Equipment: TBD      Home Living: Patient lives alone in a one-floor home.  Bathroom Setup: tub shower (no seat)  Equipment Owned: N/A     Prior Level of Function (PLOF): Patient was independent with ADLs, IADLs, and functional mobility (without device) prior to recent hospitalization. Patient reported that friends will likely be able to assist with IADLs intermittently upon his return home.  Driving: Yes     Pain Level: P  Cognition:Awake and alert. Min cues for safety.      Functional Assessment:                  AM-PAC Daily Activity Raw Score: 17/24    Initial Eval Status  Date: 10/29/2024 Treatment Status  Date: 10/30/24  Short Term Goals = Long Term Goals   Feeding Setup   Independent   Grooming Min A   Mod I (seated/standing at sink)   UB Dressing SBA   Mod I (including item retrieval)   LB Dressing Mod A Min A   Mod I / Independent - with use of AE, as needed/appropriate   Bathing Mod A   Independent / Mod I - with use of AE/DME, as needed/appropriate   Toileting Min A  SBA   Pt completed geovany hygiene.    Supervision toilet transfers.    Independent / Mod I   Bed Mobility  Supine-to-Sit: SBA  Sit-to-Supine: SBA   independent supine to sit  Independent in order to maximize patient's independence/participation with ADLs, re-positioning, and other functional tasks.   Functional Transfers Sit-to-Stand: SBA   from EOB. Cues given to maximize safety.  supervision from bed and toilet surfaces.    Independent   Functional  Mobility Min A (with walker) within patient's room; assistance needed with management of O2 line. Cues given occasionally to maximize safety with management/use of walker.  CGA without device to and from bathroom. Min A to manage O2 line from getting tangled around his feet.    Mod I with functional mobility (with device, as needed/appropriate) in order to maximize independence with ADLs/IADLs and other functional tasks.   Balance Sitting: Good (at EOB)  Standing: Fair (with walker)  stand balance during ADL SBA Fair+ dynamic standing balance during completion of ADLs/IADLs and other functional tasks.   Activity Tolerance Fair Fair -   Fatigue noted with exertion.  Slight SOB.  O2 saturation 95%.   Patient will demonstrate Good understanding and consistent implementation of energy conservation techniques and work simplification techniques into ADL/IADL routines.   Visual/  Perceptual Patient wears sunglasses consistently due to light sensitivity. Patient's vision WFL grossly.  Patient reported having B cataract surgery earlier this year.     N/A   B UE Strength, ROM B UE ROM: WFL grossly  B UE Strength: 4/5 grossly    Functional use of UE's noted.   Patient will demonstrate 5/5 B UE strength in order to maximize independence with ADLs/IADLs and functional transfers.     Comments:  Pt laying in the bed. Agreeable to therapy.  Completed mobility to the bathroom and completed toileting/personal care.  His son arrived during this time.  Pt requesting to sit on the side of the bed and visit with his son.  Declined further activity due to his son leaving soon to travel home to [place].      Education/treatment:  ADL retraining with facilitation of movement to increase self care skills. Therapeutic activity to address balance and endurance for ADL and transfers.  Pt education of O2 line safety management.       Pt has made  progress towards set goals.       Time In: 115  Time Out: 125     Min Units   Therapeutic Ex [id number]

## 2024-10-30 NOTE — PROGRESS NOTES
[facility]  [address], [address]  [place] [address]      Pulmonary Progress Note      SUBJECTIVE:  sitting up in bed on 2 li NC, claims some diarrhea/abdominal discomfort, stable/similar with respiratory function with no cough/congestion, planning for rehab.    OBJECTIVE    Medications    Continuous Infusions:   sodium chloride         Scheduled Meds:   vancomycin  1,000 mg IntraVENous Q18H    droNABinol  2.5 mg Oral BID    folic acid  1 mg Oral QAM    gabapentin  300 mg Oral BID    rosuvastatin  5 mg Oral Nightly    sodium chloride flush  10 mL IntraVENous 2 times per day    enoxaparin  40 mg SubCUTAneous Daily    piperacillin-tazobactam  4,500 mg IntraVENous Q6H    And    sodium chloride (PF)  20 mL IntraVENous Q6H       PRN Meds:HYDROcodone-acetaminophen, sodium chloride flush, sodium chloride, potassium chloride **OR** potassium alternative oral replacement **OR** potassium chloride, magnesium sulfate, ondansetron **OR** ondansetron, senna, acetaminophen **OR** acetaminophen    Physical    VITALS:  /70   Pulse 78   Temp 98.1 °F (36.7 °C) (Oral)   Resp 16   Ht 1.651 m (5' 5\")   Wt 75.4 kg (166 lb 3.6 oz)   SpO2 96%   BMI 27.66 kg/m²     24HR INTAKE/OUTPUT:      Intake/Output Summary (Last 24 hours) at 10/30/2024 0824  Last data filed at 10/29/2024 0830  Gross per 24 hour   Intake 240 ml   Output --   Net 240 ml       24HR PULSE OXIMETRY RANGE:    SpO2  Av %  Min: 96 %  Max: 100 %    General appearance: alert, appears stated age, and cooperative  Lungs: rhonchi bibasilar minimal  Heart: regular rate and rhythm, S1, S2 normal, no murmur, click, rub or gallop  Abdomen: soft, non-tender; bowel sounds normal; no masses,  no organomegaly  Extremities: extremities normal, atraumatic, no cyanosis or edema  Neurologic: Mental status: Alert, oriented, thought content appropriate    Data    CBC:   Recent Labs     10/28/24  2010

## 2024-10-30 NOTE — DISCHARGE INSTR - COC
Continuity of Care Form    Patient Name: Terell Martinez   :  1945  MRN:  [id number]    Admit date:  10/28/2024  Discharge date:  10/31/2024    Code Status Order: DNR-CCA   Advance Directives:   Advance Care Flowsheet Documentation             Admitting Physician:  Nicolas Mcgovern DO  PCP: Awadalla, Maged I, MD    Discharging Nurse: Vimal  Discharging Hospital Unit/Room#: 0533/0533-A  Discharging Unit Phone Number: [phone]    Emergency Contact:   Extended Emergency Contact Information  Primary Emergency Contact: Lena Stauffer (Cousin)  Home Phone: [phone]  Mobile Phone: [phone]  Relation: Other Relative  Secondary Emergency Contact: RodVeronica  Mobile Phone: [phone]  Relation: Friend    Past Surgical History:  Past Surgical History:   Procedure Laterality Date    BACK SURGERY      CATARACT EXTRACTION Bilateral 2024  left eye and 2024 right eye.    COLONOSCOPY      CT NEEDLE BIOPSY LUNG PERCUTANEOUS  2024    CT NEEDLE BIOPSY LUNG PERCUTANEOUS 2024 [facility] CT    EYE SURGERY      HERNIA REPAIR      JOINT REPLACEMENT      TONSILLECTOMY      TOTAL KNEE ARTHROPLASTY Left        Immunization History:   Immunization History   Administered Date(s) Administered    COVID-19, PFIZER GRAY top, DO NOT Dilute, (age 12 y+), IM, 30 mcg/0.3 mL 2022    COVID-19, PFIZER PURPLE top, DILUTE for use, (age 12 y+), 30mcg/0.3mL 2021, 2021       Active Problems:  Patient Active Problem List   Diagnosis Code    Renal calculus, right N20.0    Pulmonary nodules R91.8    Pleural effusion on right J90    Pleural effusion J90    Adenocarcinoma, lung, right (HCC) C34.91    Metastasis to brain (HCC) C79.31    Status post stereotactic radiosurgery Z92.3    Hyperlipidemia E78.5    COPD (chronic obstructive pulmonary disease) (HCC) J44.9    Moderate protein-calorie malnutrition (HCC) E44.0    Pneumonia due to organism J18.9       Isolation/Infection:   Isolation            No Isolation

## 2024-10-31 ENCOUNTER — APPOINTMENT (OUTPATIENT)
Dept: GENERAL RADIOLOGY | Age: 79
End: 2024-10-31
Payer: OTHER GOVERNMENT

## 2024-10-31 VITALS
SYSTOLIC BLOOD PRESSURE: 111 MMHG | BODY MASS INDEX: 27.7 KG/M2 | WEIGHT: 166.27 LBS | TEMPERATURE: 97.6 F | RESPIRATION RATE: 18 BRPM | HEART RATE: 70 BPM | HEIGHT: 65 IN | DIASTOLIC BLOOD PRESSURE: 66 MMHG | OXYGEN SATURATION: 98 %

## 2024-10-31 LAB
ALBUMIN SERPL-MCNC: 2.7 G/DL (ref 3.5–5.2)
ALP SERPL-CCNC: 63 U/L (ref 40–129)
ALT SERPL-CCNC: 100 U/L (ref 0–40)
ANION GAP SERPL CALCULATED.3IONS-SCNC: 10 MMOL/L (ref 7–16)
AST SERPL-CCNC: 74 U/L (ref 0–39)
BASOPHILS # BLD: 0.05 K/UL (ref 0–0.2)
BASOPHILS NFR BLD: 1 % (ref 0–2)
BILIRUB SERPL-MCNC: 0.4 MG/DL (ref 0–1.2)
BUN SERPL-MCNC: 9 MG/DL (ref 6–23)
CALCIUM SERPL-MCNC: 8.6 MG/DL (ref 8.6–10.2)
CHLORIDE SERPL-SCNC: 105 MMOL/L (ref 98–107)
CO2 SERPL-SCNC: 23 MMOL/L (ref 22–29)
CREAT SERPL-MCNC: 1 MG/DL (ref 0.7–1.2)
EOSINOPHIL # BLD: 0.39 K/UL (ref 0.05–0.5)
EOSINOPHILS RELATIVE PERCENT: 6 % (ref 0–6)
ERYTHROCYTE [DISTWIDTH] IN BLOOD BY AUTOMATED COUNT: 14.2 % (ref 11.5–15)
GFR, ESTIMATED: 77 ML/MIN/1.73M2
GLUCOSE SERPL-MCNC: 95 MG/DL (ref 74–99)
HCT VFR BLD AUTO: 29.2 % (ref 37–54)
HGB BLD-MCNC: 8.9 G/DL (ref 12.5–16.5)
IMM GRANULOCYTES # BLD AUTO: 0.05 K/UL (ref 0–0.58)
IMM GRANULOCYTES NFR BLD: 1 % (ref 0–5)
LYMPHOCYTES NFR BLD: 1.09 K/UL (ref 1.5–4)
LYMPHOCYTES RELATIVE PERCENT: 17 % (ref 20–42)
MCH RBC QN AUTO: 28.3 PG (ref 26–35)
MCHC RBC AUTO-ENTMCNC: 30.5 G/DL (ref 32–34.5)
MCV RBC AUTO: 92.7 FL (ref 80–99.9)
MICROORGANISM SPEC CULT: NORMAL
MONOCYTES NFR BLD: 0.85 K/UL (ref 0.1–0.95)
MONOCYTES NFR BLD: 14 % (ref 2–12)
NEUTROPHILS NFR BLD: 61 % (ref 43–80)
NEUTS SEG NFR BLD: 3.82 K/UL (ref 1.8–7.3)
PLATELET # BLD AUTO: 368 K/UL (ref 130–450)
PMV BLD AUTO: 9.2 FL (ref 7–12)
POTASSIUM SERPL-SCNC: 3.7 MMOL/L (ref 3.5–5)
PROT SERPL-MCNC: 5.9 G/DL (ref 6.4–8.3)
RBC # BLD AUTO: 3.15 M/UL (ref 3.8–5.8)
SERVICE CMNT-IMP: NORMAL
SODIUM SERPL-SCNC: 138 MMOL/L (ref 132–146)
SPECIMEN DESCRIPTION: NORMAL
WBC OTHER # BLD: 6.3 K/UL (ref 4.5–11.5)

## 2024-10-31 PROCEDURE — 2700000000 HC OXYGEN THERAPY PER DAY

## 2024-10-31 PROCEDURE — 2580000003 HC RX 258: Performed by: NURSE PRACTITIONER

## 2024-10-31 PROCEDURE — 71045 X-RAY EXAM CHEST 1 VIEW: CPT

## 2024-10-31 PROCEDURE — 80053 COMPREHEN METABOLIC PANEL: CPT

## 2024-10-31 PROCEDURE — 6370000000 HC RX 637 (ALT 250 FOR IP)

## 2024-10-31 PROCEDURE — 6370000000 HC RX 637 (ALT 250 FOR IP): Performed by: NURSE PRACTITIONER

## 2024-10-31 PROCEDURE — 85025 COMPLETE CBC W/AUTO DIFF WBC: CPT

## 2024-10-31 PROCEDURE — 6360000002 HC RX W HCPCS: Performed by: NURSE PRACTITIONER

## 2024-10-31 RX ORDER — LACTOBACILLUS RHAMNOSUS GG 10B CELL
1 CAPSULE ORAL
DISCHARGE
Start: 2024-11-01

## 2024-10-31 RX ORDER — HYDROCODONE BITARTRATE AND ACETAMINOPHEN 5; 325 MG/1; MG/1
1 TABLET ORAL EVERY 6 HOURS PRN
Status: SHIPPED | DISCHARGE
Start: 2024-10-31 | End: 2024-11-15

## 2024-10-31 RX ORDER — CIPROFLOXACIN 500 MG/1
500 TABLET, FILM COATED ORAL 2 TIMES DAILY
DISCHARGE
Start: 2024-10-31 | End: 2024-11-05

## 2024-10-31 RX ADMIN — PIPERACILLIN AND TAZOBACTAM 4500 MG: 4; .5 INJECTION, POWDER, FOR SOLUTION INTRAVENOUS at 13:33

## 2024-10-31 RX ADMIN — PIPERACILLIN AND TAZOBACTAM 4500 MG: 4; .5 INJECTION, POWDER, FOR SOLUTION INTRAVENOUS at 05:49

## 2024-10-31 RX ADMIN — SODIUM CHLORIDE 20 ML: 9 INJECTION INTRAMUSCULAR; INTRAVENOUS; SUBCUTANEOUS at 00:10

## 2024-10-31 RX ADMIN — ENOXAPARIN SODIUM 40 MG: 100 INJECTION SUBCUTANEOUS at 08:44

## 2024-10-31 RX ADMIN — SODIUM CHLORIDE 20 ML: 9 INJECTION INTRAMUSCULAR; INTRAVENOUS; SUBCUTANEOUS at 13:33

## 2024-10-31 RX ADMIN — Medication 1 CAPSULE: at 08:44

## 2024-10-31 RX ADMIN — VANCOMYCIN HYDROCHLORIDE 1000 MG: 1 INJECTION, POWDER, LYOPHILIZED, FOR SOLUTION INTRAVENOUS at 04:30

## 2024-10-31 RX ADMIN — DRONABINOL 2.5 MG: 2.5 CAPSULE ORAL at 08:44

## 2024-10-31 RX ADMIN — PIPERACILLIN AND TAZOBACTAM 4500 MG: 4; .5 INJECTION, POWDER, FOR SOLUTION INTRAVENOUS at 00:10

## 2024-10-31 RX ADMIN — GABAPENTIN 300 MG: 300 CAPSULE ORAL at 08:44

## 2024-10-31 RX ADMIN — SODIUM CHLORIDE, PRESERVATIVE FREE 10 ML: 5 INJECTION INTRAVENOUS at 08:44

## 2024-10-31 RX ADMIN — FOLIC ACID 1 MG: 1 TABLET ORAL at 08:44

## 2024-10-31 RX ADMIN — SODIUM CHLORIDE 20 ML: 9 INJECTION INTRAMUSCULAR; INTRAVENOUS; SUBCUTANEOUS at 05:49

## 2024-10-31 NOTE — CARE COORDINATION
Transition of Care-Discharge after attending see's this afternoon. Auth obtained this am for [facility] to transport- time is 1630. Nurse to Nurse to call report is [phone]. Facility, patient, and Lena-primary contact notified of discharge.    Ginette SWARTZ, RN  [facility]

## 2024-10-31 NOTE — PROGRESS NOTES
Pharmacy Consultation Note  (Antibiotic Dosing and Monitoring)        Note vancomycin discontinued. Clinical pharmacy will sign-off. Please re-consult if we can be of further assistance.    Thanks for this consult,  Bertha Barahona RPH, PharmD, BCPS  10/31/2024  1:33 PM

## 2024-10-31 NOTE — PROGRESS NOTES
Subjective:    The patient is awake and alert, sitting up in bed on 1.5L O2. He is hoping to be able to wean off of oxygen all together. His breathing is feeling better. He does have some complaints of diarrhea still but was unaware that I prescribed meds for him yesterday.  No problems overnight.  Denies chest pain, angina, dyspnea or abdominal discomfort. No nausea or vomiting. Tolerating diet.      Objective:    /74   Pulse 80   Temp 97.9 °F (36.6 °C) (Oral)   Resp 18   Ht 1.651 m (5' 5\")   Wt 75.4 kg (166 lb 4.3 oz)   SpO2 98%   BMI 27.67 kg/m²     General: Alert, oriented, no acute distress  HEENT: No thrush or mucositis, EOMI, PERRLA  Heart:  RRR, no murmurs, gallops, or rubs.  Lungs:  Rhonchi bilat bases, no wheezing or crackles  Abd: BS present, nontender, nondistended, no masses  Extrem:  No clubbing, cyanosis, or edema  Lymphatics: No palpable adenopathy in cervical and supraclavicular regions  Skin: Intact, no petechia or purpura    CBC with Differential:    Lab Results   Component Value Date/Time    WBC 6.3 10/31/2024 02:30 AM    RBC 3.15 10/31/2024 02:30 AM    HGB 8.9 10/31/2024 02:30 AM    HCT 29.2 10/31/2024 02:30 AM     10/31/2024 02:30 AM    MCV 92.7 10/31/2024 02:30 AM    MCH 28.3 10/31/2024 02:30 AM    MCHC 30.5 10/31/2024 02:30 AM    RDW 14.2 10/31/2024 02:30 AM    LYMPHOPCT 17 10/31/2024 02:30 AM    MONOPCT 14 10/31/2024 02:30 AM    EOSPCT 6 10/31/2024 02:30 AM    BASOPCT 1 10/31/2024 02:30 AM    MONOSABS 0.85 10/31/2024 02:30 AM    LYMPHSABS 1.09 10/31/2024 02:30 AM    EOSABS 0.39 10/31/2024 02:30 AM    BASOSABS 0.05 10/31/2024 02:30 AM     CMP:    Lab Results   Component Value Date/Time     10/31/2024 02:30 AM    K 3.7 10/31/2024 02:30 AM     10/31/2024 02:30 AM    CO2 23 10/31/2024 02:30 AM    BUN 9 10/31/2024 02:30 AM    CREATININE 1.0 10/31/2024 02:30 AM    LABGLOM 77 10/31/2024 02:30 AM    LABGLOM 71 04/04/2024 09:43 AM    GLUCOSE 95 10/31/2024 02:30 AM

## 2024-10-31 NOTE — PLAN OF CARE
Problem: Discharge Planning  Goal: Discharge to home or other facility with appropriate resources  10/31/2024 1037 by Vimal Valentin RN  Outcome: Progressing  10/31/2024 0452 by Ruby Fernando RN  Outcome: Progressing     Problem: Pain  Goal: Verbalizes/displays adequate comfort level or baseline comfort level  10/31/2024 1037 by Vimal Valentin RN  Outcome: Progressing  10/31/2024 0452 by Ruby Fernando RN  Outcome: Progressing     Problem: Safety - Adult  Goal: Free from fall injury  10/31/2024 1037 by Vimal Valentin RN  Outcome: Progressing  10/31/2024 0452 by Ruby Fernando RN  Outcome: Progressing     Problem: ABCDS Injury Assessment  Goal: Absence of physical injury  Outcome: Progressing     Problem: Nutrition Deficit:  Goal: Optimize nutritional status  Outcome: Progressing

## 2024-10-31 NOTE — DISCHARGE SUMMARY
Internal Medicine Discharge Summary    NAME: Terell Martinez :  1945  MRN:  [id number] PCP:Awadalla, Maged I, MD    ADMITTED: 10/28/2024   DISCHARGED: No discharge date for patient encounter.    ADMITTING PHYSICIAN: Keenan Stauffer MD    PCP: Awadalla, Maged I, MD    CONSULTANT(S):   IP CONSULT TO INTERNAL MEDICINE  IP CONSULT TO SOCIAL WORK  IP CONSULT TO PHARMACY  IP CONSULT TO PULMONOLOGY  IP CONSULT TO ONCOLOGY     ADMITTING DIAGNOSIS:   Pneumonia due to organism [J18.9]  Elevated LFTs [R79.89]  Pneumonia of both lungs due to infectious organism, unspecified part of lung [J18.9]     Please see H&P for further details    DISCHARGE DIAGNOSES:   Active Hospital Problems    Diagnosis     Pneumonia due to organism [J18.9]     Moderate protein-calorie malnutrition (HCC) [E44.0]     COPD (chronic obstructive pulmonary disease) (HCC) [J44.9]     Metastasis to brain (HCC) [C79.31]     Adenocarcinoma, lung, right (HCC) [C34.91]     Pulmonary nodules [R91.8]        BRIEF HISTORY OF PRESENT ILLNESS: Terell Martinez is a 79 y.o. male patient of Awadalla, Maged I, MD who  has a past medical history of Adenocarcinoma, lung, right (HCC), Cancer (HCC), COPD (chronic obstructive pulmonary disease) (HCC), Hyperlipidemia, Secondary cancer of bone (HCC), Secondary cancer of brain (HCC), and Status post stereotactic radiosurgery. who originally had concerns including Fatigue (Pt discharge from hospital Friday and since has been fatigue had fall at home. Pt states hit head when fell does not take blood thinners ) and Shortness of Breath. at presentation on 10/28/2024, and was found to have Pneumonia due to organism [J18.9]  Elevated LFTs [R79.89]  Pneumonia of both lungs due to infectious organism, unspecified part of lung [J18.9] after workup.    Please see H&P for further details.    HOSPITAL COURSE:   The patient presented to the hospital with the chief complaint of Fatigue (Pt discharge from hospital Friday and since has been fatigue  the posterior basal parietal pleura as well as a 7 mm enhancing pleural nodule suspicious for pleural metastatic disease. 2. Cholelithiasis without cholecystitis. 3. No evidence of abdominal or pelvic or osseous metastatic disease.     CT HEAD WO CONTRAST    Result Date: 10/23/2024  EXAMINATION: CT OF THE HEAD WITHOUT CONTRAST  10/23/2024 1:31 pm TECHNIQUE: CT of the head was performed without the administration of intravenous contrast. Automated exposure control, iterative reconstruction, and/or weight based adjustment of the mA/kV was utilized to reduce the radiation dose to as low as reasonably achievable. COMPARISON: MRI of the brain dated 08/28/2024 HISTORY: ORDERING SYSTEM PROVIDED HISTORY: sob lethargic hx of lung cancer mets to brain TECHNOLOGIST PROVIDED HISTORY: Reason for exam:->sob lethargic hx of lung cancer mets to brain Has a \"code stroke\" or \"stroke alert\" been called?->No Decision Support Exception - unselect if not a suspected or confirmed emergency medical condition->Emergency Medical Condition (MA) FINDINGS: BRAIN/VENTRICLES: Generalized atrophy identified the brain.  Minimal low-attenuation areas seen within the periventricular and subcortical white matter to suggest chronic small vessel ischemic change.  Known metastatic lesions within the brain are not well seen on this unenhanced examination. There is no significant vasogenic edema.  There is no acute intracranial hemorrhage, mass effect or midline shift.  No abnormal extra-axial fluid collection.  The gray-white differentiation is maintained without evidence of an acute infarct.  There is no evidence of hydrocephalus. ORBITS: The visualized portion of the orbits demonstrate no acute abnormality. SINUSES: The visualized paranasal sinuses and mastoid air cells demonstrate no acute abnormality. SOFT TISSUES/SKULL:  No acute abnormality of the visualized skull or soft tissues.     Known lesions within the brain parenchyma are not well seen on

## 2024-10-31 NOTE — PROGRESS NOTES
[facility]  [address], [address]  [place] [address]      Pulmonary Progress Note      SUBJECTIVE:  reclining in bed on 2 li NC, stable/similar with respiratory function with no cough/congestion, planning for rehab (?) tomorrow.    OBJECTIVE    Medications    Continuous Infusions:   sodium chloride         Scheduled Meds:   lactobacillus  1 capsule Oral Daily with breakfast    vancomycin  1,000 mg IntraVENous Q18H    droNABinol  2.5 mg Oral BID    folic acid  1 mg Oral QAM    gabapentin  300 mg Oral BID    rosuvastatin  5 mg Oral Nightly    sodium chloride flush  10 mL IntraVENous 2 times per day    enoxaparin  40 mg SubCUTAneous Daily    piperacillin-tazobactam  4,500 mg IntraVENous Q6H    And    sodium chloride (PF)  20 mL IntraVENous Q6H       PRN Meds:loperamide, HYDROcodone-acetaminophen, sodium chloride flush, sodium chloride, potassium chloride **OR** potassium alternative oral replacement **OR** potassium chloride, magnesium sulfate, ondansetron **OR** ondansetron, senna, acetaminophen **OR** acetaminophen    Physical    VITALS:  /74   Pulse 80   Temp 97.9 °F (36.6 °C) (Oral)   Resp 18   Ht 1.651 m (5' 5\")   Wt 75.4 kg (166 lb 4.3 oz)   SpO2 98%   BMI 27.67 kg/m²     24HR INTAKE/OUTPUT:    No intake or output data in the 24 hours ending 10/31/24 0810      24HR PULSE OXIMETRY RANGE:    SpO2  Av %  Min: 98 %  Max: 98 %    General appearance: alert, appears stated age, and cooperative  Lungs: rhonchi bibasilar minimal  Heart: regular rate and rhythm, S1, S2 normal, no murmur, click, rub or gallop  Abdomen: soft, non-tender; bowel sounds normal; no masses,  no organomegaly  Extremities: extremities normal, atraumatic, no cyanosis or edema  Neurologic: Mental status: Alert, oriented, thought content appropriate    Data    CBC:   Recent Labs     10/29/24  0439 10/30/24  0450 10/31/24  0230   WBC 6.6 6.4 6.3   HGB 9.6* 9.1*  8.9*   HCT 30.1* 29.6* 29.2*   MCV 90.4 93.1 92.7    382 368       BMP:  Recent Labs     10/29/24  0439 10/30/24  0450 10/31/24  0230    139 138   K 3.6 3.9 3.7   CL 99 106 105   CO2 25 24 23   BUN 18 11 9   CREATININE 1.1 1.0 1.0    ALB:3,BILIDIR:3,BILITOT:3,ALKPHOS:3)@    PT/INR: No results for input(s): \"PROTIME\", \"INR\" in the last 72 hours.    ABG:   No results for input(s): \"PH\", \"PO2\", \"PCO2\", \"HCO3\", \"BE\", \"O2SAT\", \"METHB\", \"O2HB\", \"COHB\", \"O2CON\", \"HHB\", \"THB\" in the last 72 hours.          Radiology/Other tests reviewed: CXR reviewed looking similar compared to previous    Assessment:     Principal Problem:    Pneumonia due to organism  Active Problems:    Pulmonary nodules    Adenocarcinoma, lung, right (HCC)    Metastasis to brain (HCC)    COPD (chronic obstructive pulmonary disease) (HCC)    Moderate protein-calorie malnutrition (HCC)  Resolved Problems:    * No resolved hospital problems. *      Plan:       Not much recollection of effusion noted, no clear need for pleurx cath for now, can repeat CXR in a week for ffup, can consider pleurx cath placement as out-pt if needed  Cont with oxygen, taper as tolerated  No lung medications, observe respiratory function  Cont with antibiotics, blood culture (-), may be able to deescalate antibiotics quickly for a short course given lack of symptoms  Can be discharged from pulmonary pov      Time at the bedside, reviewing labs and radiographs, reviewing notes and consultations, discussing with staff and family was more than 50 minutes.      Thanks for letting us see this patient in consultation.  Please contact us with any questions. Office [phone] or after hours through [employer], x [phone].

## 2024-12-02 DIAGNOSIS — Z01.812 BLOOD TESTS PRIOR TO TREATMENT OR PROCEDURE: Primary | ICD-10-CM

## 2024-12-03 ENCOUNTER — HOSPITAL ENCOUNTER (OUTPATIENT)
Age: 79
Discharge: HOME | End: 2024-12-03
Payer: OTHER GOVERNMENT

## 2024-12-03 ENCOUNTER — HOSPITAL ENCOUNTER (OUTPATIENT)
Dept: MRI IMAGING | Age: 79
Discharge: HOME | End: 2024-12-05
Payer: OTHER GOVERNMENT

## 2024-12-03 DIAGNOSIS — Z08 ENCOUNTER FOR FOLLOW-UP SURVEILLANCE OF BRAIN CANCER: ICD-10-CM

## 2024-12-03 DIAGNOSIS — Z01.812 BLOOD TESTS PRIOR TO TREATMENT OR PROCEDURE: ICD-10-CM

## 2024-12-03 DIAGNOSIS — Z85.841 ENCOUNTER FOR FOLLOW-UP SURVEILLANCE OF BRAIN CANCER: ICD-10-CM

## 2024-12-03 DIAGNOSIS — Z08 ENCOUNTER FOR FOLLOW-UP EXAMINATION AFTER COMPLETED TREATMENT FOR MALIGNANT NEOPLASM: ICD-10-CM

## 2024-12-03 LAB
BUN SERPL-MCNC: 16 MG/DL (ref 6–23)
CREAT SERPL-MCNC: 0.8 MG/DL (ref 0.7–1.2)
GFR, ESTIMATED: >90 ML/MIN/1.73M2

## 2024-12-03 PROCEDURE — 6360000004 HC RX CONTRAST MEDICATION: Performed by: RADIOLOGY

## 2024-12-03 PROCEDURE — A9577 INJ MULTIHANCE: HCPCS | Performed by: RADIOLOGY

## 2024-12-03 PROCEDURE — 82565 ASSAY OF CREATININE: CPT

## 2024-12-03 PROCEDURE — 70553 MRI BRAIN STEM W/O & W/DYE: CPT

## 2024-12-03 PROCEDURE — 36415 COLL VENOUS BLD VENIPUNCTURE: CPT

## 2024-12-03 PROCEDURE — 84520 ASSAY OF UREA NITROGEN: CPT

## 2024-12-03 RX ADMIN — GADOBENATE DIMEGLUMINE 30 ML: 529 INJECTION, SOLUTION INTRAVENOUS at 17:47

## 2024-12-05 ENCOUNTER — TELEPHONE (OUTPATIENT)
Dept: RADIATION ONCOLOGY | Age: 79
End: 2024-12-05

## 2024-12-05 NOTE — TELEPHONE ENCOUNTER
Placed call to Terell Martinez to discussed MRI BRAIN results and recommendations. No answer, left voicemail identifying myself, reason for call and my direct office callback number for today. Will await patient's return call.

## 2024-12-05 NOTE — TELEPHONE ENCOUNTER
Terell Martinez returned my call. Discussed MRI BRAIN completed 12/03/2024 that revealed the left anterior frontal metastasis (treated metastasis) is grossly stable. Also revealed two (2) enlarging CNS metastases, a 4.2 mm metastasis in the right parietal periventricular white matter; a 4.4 mm metastasis in the left thalamus. Recommend proceeding with SRS. The patient is agreeable to proceed. All questions answered. Terell Martinez aware the one of the RTT from [facility] Radiation Oncology department will be calling to scheduled for CT SIM.

## 2024-12-12 ENCOUNTER — CLINICAL DOCUMENTATION (OUTPATIENT)
Dept: RADIATION ONCOLOGY | Age: 79
End: 2024-12-12

## 2024-12-12 NOTE — PROGRESS NOTES
RADIATION ONCOLOGY NOTE      I spoke to Dr. Miky Song today regarding the patient and treatment of his intracranial metastasis.  I was informed that the patient's extracranial disease is progressing with evidence of pleural effusion and poor oxygenation.  Dr. Song has requested that we defer treatment of his brain metastasis until there is greater clarity on his progress with chemotherapy.    I therefore terminated the plans for treatment.  Dr. Kaufman and will alert us once the patient has stabilized and we will obtain new MRI of his brain to assess the extent of intracranial disease.          Brett Mayfield MD, JANETT, FACRO, FACR, FCTSI, FASTRO    Department of Radiation Oncology  [facility]: [phone] (FAX: [phone])  [facility]: [phone] (FAX: [phone])  [facility]:  [phone] (FAX:  [phone])    NOTE: This report was transcribed using voice recognition software. Every effort was made to ensure accuracy; however, inadvertent computerized transcription errors may be present.    
please take motrin 600mg every 6 hours as needed for pain control

## 2024-12-17 ENCOUNTER — HOSPITAL ENCOUNTER (INPATIENT)
Age: 79
LOS: 9 days | Discharge: NURSING, REHAB OR LONG-TERM CARE | DRG: 542 | End: 2024-12-26
Attending: EMERGENCY MEDICINE | Admitting: INTERNAL MEDICINE
Payer: MEDICARE

## 2024-12-17 ENCOUNTER — APPOINTMENT (OUTPATIENT)
Dept: CT IMAGING | Age: 79
DRG: 542 | End: 2024-12-17
Payer: MEDICARE

## 2024-12-17 DIAGNOSIS — M54.9 INTRACTABLE BACK PAIN: ICD-10-CM

## 2024-12-17 DIAGNOSIS — C34.91 ADENOCARCINOMA, LUNG, RIGHT (HCC): ICD-10-CM

## 2024-12-17 DIAGNOSIS — M89.9 LYTIC BONE LESIONS ON XRAY: ICD-10-CM

## 2024-12-17 DIAGNOSIS — I26.99 ACUTE PULMONARY EMBOLISM WITHOUT ACUTE COR PULMONALE (HCC): ICD-10-CM

## 2024-12-17 DIAGNOSIS — I26.99 ACUTE PULMONARY EMBOLISM WITHOUT ACUTE COR PULMONALE, UNSPECIFIED PULMONARY EMBOLISM TYPE (HCC): ICD-10-CM

## 2024-12-17 DIAGNOSIS — C79.9 METASTATIC MALIGNANT NEOPLASM, UNSPECIFIED SITE (HCC): Primary | ICD-10-CM

## 2024-12-17 PROBLEM — R52 INTRACTABLE PAIN: Status: ACTIVE | Noted: 2024-12-17

## 2024-12-17 LAB
ALBUMIN SERPL-MCNC: 3.7 G/DL (ref 3.5–5.2)
ALP SERPL-CCNC: 121 U/L (ref 40–129)
ALT SERPL-CCNC: 16 U/L (ref 0–40)
ANION GAP SERPL CALCULATED.3IONS-SCNC: 11 MMOL/L (ref 7–16)
AST SERPL-CCNC: 20 U/L (ref 0–39)
ATYPICAL LYMPHOCYTE ABSOLUTE COUNT: 0.17 K/UL (ref 0–0.46)
ATYPICAL LYMPHOCYTES: 2 % (ref 0–4)
BASOPHILS # BLD: 0.08 K/UL (ref 0–0.2)
BASOPHILS NFR BLD: 1 % (ref 0–2)
BILIRUB SERPL-MCNC: 0.8 MG/DL (ref 0–1.2)
BILIRUB UR QL STRIP: ABNORMAL
BUN SERPL-MCNC: 15 MG/DL (ref 6–23)
CALCIUM SERPL-MCNC: 9.1 MG/DL (ref 8.6–10.2)
CHLORIDE SERPL-SCNC: 102 MMOL/L (ref 98–107)
CLARITY UR: CLEAR
CO2 SERPL-SCNC: 24 MMOL/L (ref 22–29)
COLOR UR: YELLOW
CREAT SERPL-MCNC: 0.9 MG/DL (ref 0.7–1.2)
EOSINOPHIL # BLD: 0.08 K/UL (ref 0.05–0.5)
EOSINOPHILS RELATIVE PERCENT: 1 % (ref 0–6)
EPI CELLS #/AREA URNS HPF: NORMAL /HPF
ERYTHROCYTE [DISTWIDTH] IN BLOOD BY AUTOMATED COUNT: 16.4 % (ref 11.5–15)
GFR, ESTIMATED: 86 ML/MIN/1.73M2
GLUCOSE SERPL-MCNC: 116 MG/DL (ref 74–99)
GLUCOSE UR STRIP-MCNC: NEGATIVE MG/DL
HCT VFR BLD AUTO: 40 % (ref 37–54)
HGB BLD-MCNC: 12.6 G/DL (ref 12.5–16.5)
HGB UR QL STRIP.AUTO: NEGATIVE
KETONES UR STRIP-MCNC: ABNORMAL MG/DL
LEUKOCYTE ESTERASE UR QL STRIP: NEGATIVE
LIPASE SERPL-CCNC: 13 U/L (ref 13–60)
LYMPHOCYTES NFR BLD: 1.67 K/UL (ref 1.5–4)
LYMPHOCYTES RELATIVE PERCENT: 17 % (ref 20–42)
MAGNESIUM SERPL-MCNC: 2 MG/DL (ref 1.6–2.6)
MCH RBC QN AUTO: 28.1 PG (ref 26–35)
MCHC RBC AUTO-ENTMCNC: 31.5 G/DL (ref 32–34.5)
MCV RBC AUTO: 89.3 FL (ref 80–99.9)
MONOCYTES NFR BLD: 0.67 K/UL (ref 0.1–0.95)
MONOCYTES NFR BLD: 7 % (ref 2–12)
NEUTROPHILS NFR BLD: 70 % (ref 43–80)
NEUTS SEG NFR BLD: 6.76 K/UL (ref 1.8–7.3)
NITRITE UR QL STRIP: NEGATIVE
NUCLEATED RED BLOOD CELLS: 1 PER 100 WBC
PH UR STRIP: 6 [PH] (ref 5–9)
PLATELET # BLD AUTO: 171 K/UL (ref 130–450)
PMV BLD AUTO: 9.9 FL (ref 7–12)
POTASSIUM SERPL-SCNC: 4.4 MMOL/L (ref 3.5–5)
PROMYELOCYTES ABSOLUTE COUNT: 0.17 K/UL
PROMYELOCYTES: 2 %
PROT SERPL-MCNC: 7.6 G/DL (ref 6.4–8.3)
PROT UR STRIP-MCNC: ABNORMAL MG/DL
RBC # BLD AUTO: 4.48 M/UL (ref 3.8–5.8)
RBC # BLD: ABNORMAL 10*6/UL
RBC #/AREA URNS HPF: NORMAL /HPF
SODIUM SERPL-SCNC: 137 MMOL/L (ref 132–146)
SP GR UR STRIP: 1.02 (ref 1–1.03)
TROPONIN I SERPL HS-MCNC: 12 NG/L (ref 0–11)
TROPONIN I SERPL HS-MCNC: 18 NG/L (ref 0–11)
UROBILINOGEN UR STRIP-ACNC: 0.2 EU/DL (ref 0–1)
WBC # BLD: ABNORMAL 10*3/UL
WBC # BLD: ABNORMAL 10*3/UL
WBC #/AREA URNS HPF: NORMAL /HPF
WBC OTHER # BLD: 9.6 K/UL (ref 4.5–11.5)

## 2024-12-17 PROCEDURE — 85025 COMPLETE CBC W/AUTO DIFF WBC: CPT

## 2024-12-17 PROCEDURE — 96372 THER/PROPH/DIAG INJ SC/IM: CPT

## 2024-12-17 PROCEDURE — 99285 EMERGENCY DEPT VISIT HI MDM: CPT

## 2024-12-17 PROCEDURE — 6360000002 HC RX W HCPCS: Performed by: HOSPITALIST

## 2024-12-17 PROCEDURE — 72128 CT CHEST SPINE W/O DYE: CPT

## 2024-12-17 PROCEDURE — 2060000000 HC ICU INTERMEDIATE R&B

## 2024-12-17 PROCEDURE — 71275 CT ANGIOGRAPHY CHEST: CPT

## 2024-12-17 PROCEDURE — 96374 THER/PROPH/DIAG INJ IV PUSH: CPT

## 2024-12-17 PROCEDURE — 80053 COMPREHEN METABOLIC PANEL: CPT

## 2024-12-17 PROCEDURE — 84484 ASSAY OF TROPONIN QUANT: CPT

## 2024-12-17 PROCEDURE — 6360000004 HC RX CONTRAST MEDICATION: Performed by: RADIOLOGY

## 2024-12-17 PROCEDURE — 6360000002 HC RX W HCPCS: Performed by: EMERGENCY MEDICINE

## 2024-12-17 PROCEDURE — 83690 ASSAY OF LIPASE: CPT

## 2024-12-17 PROCEDURE — 2500000003 HC RX 250 WO HCPCS: Performed by: HOSPITALIST

## 2024-12-17 PROCEDURE — 6370000000 HC RX 637 (ALT 250 FOR IP): Performed by: HOSPITALIST

## 2024-12-17 PROCEDURE — 81001 URINALYSIS AUTO W/SCOPE: CPT

## 2024-12-17 PROCEDURE — 2580000003 HC RX 258: Performed by: HOSPITALIST

## 2024-12-17 PROCEDURE — 2700000000 HC OXYGEN THERAPY PER DAY

## 2024-12-17 PROCEDURE — 1200000000 HC SEMI PRIVATE

## 2024-12-17 PROCEDURE — 74176 CT ABD & PELVIS W/O CONTRAST: CPT

## 2024-12-17 PROCEDURE — 83735 ASSAY OF MAGNESIUM: CPT

## 2024-12-17 PROCEDURE — 72131 CT LUMBAR SPINE W/O DYE: CPT

## 2024-12-17 RX ORDER — OXYCODONE HYDROCHLORIDE 5 MG/1
5 TABLET ORAL EVERY 6 HOURS PRN
Status: ON HOLD | COMMUNITY
Start: 2024-12-11 | End: 2024-12-26 | Stop reason: HOSPADM

## 2024-12-17 RX ORDER — ONDANSETRON 4 MG/1
4 TABLET, ORALLY DISINTEGRATING ORAL EVERY 8 HOURS PRN
Status: DISCONTINUED | OUTPATIENT
Start: 2024-12-17 | End: 2024-12-26 | Stop reason: HOSPADM

## 2024-12-17 RX ORDER — POLYETHYLENE GLYCOL 3350 17 G/17G
17 POWDER, FOR SOLUTION ORAL DAILY PRN
Status: DISCONTINUED | OUTPATIENT
Start: 2024-12-17 | End: 2024-12-26 | Stop reason: HOSPADM

## 2024-12-17 RX ORDER — POTASSIUM CHLORIDE 1500 MG/1
40 TABLET, EXTENDED RELEASE ORAL PRN
Status: DISCONTINUED | OUTPATIENT
Start: 2024-12-17 | End: 2024-12-26 | Stop reason: HOSPADM

## 2024-12-17 RX ORDER — POTASSIUM CHLORIDE 7.45 MG/ML
10 INJECTION INTRAVENOUS PRN
Status: DISCONTINUED | OUTPATIENT
Start: 2024-12-17 | End: 2024-12-26 | Stop reason: HOSPADM

## 2024-12-17 RX ORDER — ACETAMINOPHEN 650 MG/1
650 SUPPOSITORY RECTAL EVERY 6 HOURS PRN
Status: DISCONTINUED | OUTPATIENT
Start: 2024-12-17 | End: 2024-12-26 | Stop reason: HOSPADM

## 2024-12-17 RX ORDER — CHOLECALCIFEROL (VITAMIN D3) 50 MCG
2000 TABLET ORAL EVERY MORNING
Status: DISCONTINUED | OUTPATIENT
Start: 2024-12-18 | End: 2024-12-26 | Stop reason: HOSPADM

## 2024-12-17 RX ORDER — ENOXAPARIN SODIUM 100 MG/ML
1 INJECTION SUBCUTANEOUS 2 TIMES DAILY
Status: DISCONTINUED | OUTPATIENT
Start: 2024-12-17 | End: 2024-12-17 | Stop reason: SDUPTHER

## 2024-12-17 RX ORDER — ONDANSETRON 2 MG/ML
4 INJECTION INTRAMUSCULAR; INTRAVENOUS EVERY 6 HOURS PRN
Status: DISCONTINUED | OUTPATIENT
Start: 2024-12-17 | End: 2024-12-26 | Stop reason: HOSPADM

## 2024-12-17 RX ORDER — SODIUM CHLORIDE 0.9 % (FLUSH) 0.9 %
5-40 SYRINGE (ML) INJECTION PRN
Status: DISCONTINUED | OUTPATIENT
Start: 2024-12-17 | End: 2024-12-26 | Stop reason: HOSPADM

## 2024-12-17 RX ORDER — SODIUM CHLORIDE 9 MG/ML
INJECTION, SOLUTION INTRAVENOUS PRN
Status: DISCONTINUED | OUTPATIENT
Start: 2024-12-17 | End: 2024-12-26 | Stop reason: HOSPADM

## 2024-12-17 RX ORDER — SODIUM CHLORIDE 0.9 % (FLUSH) 0.9 %
5-40 SYRINGE (ML) INJECTION EVERY 12 HOURS SCHEDULED
Status: DISCONTINUED | OUTPATIENT
Start: 2024-12-17 | End: 2024-12-26 | Stop reason: HOSPADM

## 2024-12-17 RX ORDER — IOPAMIDOL 755 MG/ML
75 INJECTION, SOLUTION INTRAVASCULAR
Status: COMPLETED | OUTPATIENT
Start: 2024-12-17 | End: 2024-12-17

## 2024-12-17 RX ORDER — ENOXAPARIN SODIUM 100 MG/ML
40 INJECTION SUBCUTANEOUS DAILY
Status: DISCONTINUED | OUTPATIENT
Start: 2024-12-17 | End: 2024-12-18

## 2024-12-17 RX ORDER — SODIUM CHLORIDE 9 MG/ML
INJECTION, SOLUTION INTRAVENOUS CONTINUOUS
Status: ACTIVE | OUTPATIENT
Start: 2024-12-17 | End: 2024-12-18

## 2024-12-17 RX ORDER — ACETAMINOPHEN 325 MG/1
650 TABLET ORAL EVERY 6 HOURS PRN
Status: DISCONTINUED | OUTPATIENT
Start: 2024-12-17 | End: 2024-12-26 | Stop reason: HOSPADM

## 2024-12-17 RX ORDER — BACLOFEN 10 MG/1
10 TABLET ORAL 3 TIMES DAILY
Status: DISCONTINUED | OUTPATIENT
Start: 2024-12-17 | End: 2024-12-19

## 2024-12-17 RX ORDER — MAGNESIUM SULFATE IN WATER 40 MG/ML
2000 INJECTION, SOLUTION INTRAVENOUS PRN
Status: DISCONTINUED | OUTPATIENT
Start: 2024-12-17 | End: 2024-12-26 | Stop reason: HOSPADM

## 2024-12-17 RX ADMIN — ENOXAPARIN SODIUM 40 MG: 100 INJECTION SUBCUTANEOUS at 20:22

## 2024-12-17 RX ADMIN — IOPAMIDOL 75 ML: 755 INJECTION, SOLUTION INTRAVENOUS at 17:19

## 2024-12-17 RX ADMIN — HYDROMORPHONE HYDROCHLORIDE 0.5 MG: 1 INJECTION, SOLUTION INTRAMUSCULAR; INTRAVENOUS; SUBCUTANEOUS at 16:23

## 2024-12-17 RX ADMIN — SODIUM CHLORIDE, PRESERVATIVE FREE 10 ML: 5 INJECTION INTRAVENOUS at 20:22

## 2024-12-17 RX ADMIN — HYDROMORPHONE HYDROCHLORIDE 0.5 MG: 1 INJECTION, SOLUTION INTRAMUSCULAR; INTRAVENOUS; SUBCUTANEOUS at 11:38

## 2024-12-17 RX ADMIN — BACLOFEN 10 MG: 10 TABLET ORAL at 20:22

## 2024-12-17 RX ADMIN — SODIUM CHLORIDE: 9 INJECTION, SOLUTION INTRAVENOUS at 20:29

## 2024-12-17 ASSESSMENT — PAIN SCALES - GENERAL
PAINLEVEL_OUTOF10: 10
PAINLEVEL_OUTOF10: 5

## 2024-12-17 ASSESSMENT — PAIN DESCRIPTION - LOCATION
LOCATION: BACK
LOCATION: BACK

## 2024-12-17 ASSESSMENT — PAIN DESCRIPTION - FREQUENCY: FREQUENCY: CONTINUOUS

## 2024-12-17 ASSESSMENT — PAIN DESCRIPTION - PAIN TYPE: TYPE: ACUTE PAIN

## 2024-12-17 ASSESSMENT — PAIN DESCRIPTION - DESCRIPTORS: DESCRIPTORS: DISCOMFORT

## 2024-12-17 NOTE — ED PROVIDER NOTES
79-year-old male known history of lung cancer with metastatic disease to the bones presents to the emergency department with worsening back pain.  Patient has a known history of metastatic disease to the bone of his back but states the oxycodone that he has been given by his oncologist is not helping.  The patient also reports some intermittent epistaxis though at this time bleeding has stopped.  He reports no bowel or bladder incontinence no saddle paresthesias no fevers no other concerns or complaints at this time    The history is provided by the patient.   Back Pain  Location:  Thoracic spine and lumbar spine  Quality:  Aching  Radiates to:  Does not radiate  Pain severity:  Moderate  Onset quality:  Gradual  Duration:  3 months  Timing:  Intermittent  Progression:  Waxing and waning  Chronicity:  New  Relieved by:  Nothing  Worsened by:  Nothing  Ineffective treatments:  None tried  Associated symptoms: no abdominal pain, no bladder incontinence, no bowel incontinence, no chest pain, no dysuria, no fever, no headaches, no numbness, no paresthesias, no perianal numbness and no weakness    Risk factors: hx of cancer         Review of Systems   Constitutional:  Negative for chills and fever.   HENT:  Positive for nosebleeds. Negative for ear pain, sinus pressure and sore throat.    Eyes:  Negative for pain, discharge and redness.   Respiratory:  Negative for cough, shortness of breath and wheezing.    Cardiovascular:  Negative for chest pain.   Gastrointestinal:  Positive for constipation. Negative for abdominal pain, bowel incontinence, diarrhea, nausea and vomiting.   Genitourinary:  Negative for bladder incontinence, dysuria and frequency.   Musculoskeletal:  Positive for back pain. Negative for arthralgias.   Skin:  Negative for rash and wound.   Neurological:  Negative for weakness, numbness, headaches and paresthesias.   Hematological:  Negative for adenopathy.   All other systems reviewed and are

## 2024-12-17 NOTE — CARE COORDINATION
Internal Medicine On-call Care Coordination Note     I was called by the ED physician because they recommended admission for this patient and we cover their PCP.  The history as I understand it after discussion with the ED physician is as follows:     Admit for intractable pain  IV pain meds ordered  Hospice consulted     I placed admission orders.  Including:     Continue meds as ordered       Dr. Mcgovern or his coverage will see the patient tomorrow for H&P and review of all orders.     Electronically signed by Keenan Stauffer MD on 12/17/2024 at 6:04 PM

## 2024-12-18 PROBLEM — I26.99 ACUTE PULMONARY EMBOLISM WITHOUT ACUTE COR PULMONALE (HCC): Status: ACTIVE | Noted: 2024-12-18

## 2024-12-18 LAB
ALBUMIN SERPL-MCNC: 3 G/DL (ref 3.5–5.2)
ALP SERPL-CCNC: 89 U/L (ref 40–129)
ALT SERPL-CCNC: 14 U/L (ref 0–40)
ANION GAP SERPL CALCULATED.3IONS-SCNC: 11 MMOL/L (ref 7–16)
AST SERPL-CCNC: 17 U/L (ref 0–39)
BASOPHILS # BLD: 0 K/UL (ref 0–0.2)
BASOPHILS NFR BLD: 0 % (ref 0–2)
BILIRUB SERPL-MCNC: 0.7 MG/DL (ref 0–1.2)
BUN SERPL-MCNC: 16 MG/DL (ref 6–23)
CALCIUM SERPL-MCNC: 8.3 MG/DL (ref 8.6–10.2)
CHLORIDE SERPL-SCNC: 102 MMOL/L (ref 98–107)
CO2 SERPL-SCNC: 23 MMOL/L (ref 22–29)
CREAT SERPL-MCNC: 1 MG/DL (ref 0.7–1.2)
EOSINOPHIL # BLD: 0.25 K/UL (ref 0.05–0.5)
EOSINOPHILS RELATIVE PERCENT: 2 % (ref 0–6)
ERYTHROCYTE [DISTWIDTH] IN BLOOD BY AUTOMATED COUNT: 16.5 % (ref 11.5–15)
GFR, ESTIMATED: 79 ML/MIN/1.73M2
GLUCOSE SERPL-MCNC: 112 MG/DL (ref 74–99)
HCT VFR BLD AUTO: 34.1 % (ref 37–54)
HGB BLD-MCNC: 10.8 G/DL (ref 12.5–16.5)
LACTATE BLDV-SCNC: 1.8 MMOL/L (ref 0.5–2.2)
LYMPHOCYTES NFR BLD: 0 K/UL (ref 1.5–4)
LYMPHOCYTES RELATIVE PERCENT: 0 % (ref 20–42)
MCH RBC QN AUTO: 28.7 PG (ref 26–35)
MCHC RBC AUTO-ENTMCNC: 31.7 G/DL (ref 32–34.5)
MCV RBC AUTO: 90.7 FL (ref 80–99.9)
METAMYELOCYTES ABSOLUTE COUNT: 0.25 K/UL (ref 0–0.12)
METAMYELOCYTES: 2 % (ref 0–1)
MONOCYTES NFR BLD: 0.49 K/UL (ref 0.1–0.95)
MONOCYTES NFR BLD: 4 % (ref 2–12)
MYELOCYTES ABSOLUTE COUNT: 0.12 K/UL
MYELOCYTES: 1 %
NEUTROPHILS NFR BLD: 90 % (ref 43–80)
NEUTS SEG NFR BLD: 12.72 K/UL (ref 1.8–7.3)
NUCLEATED RED BLOOD CELLS: 1 PER 100 WBC
PHOSPHATE SERPL-MCNC: 2.6 MG/DL (ref 2.5–4.5)
PLATELET # BLD AUTO: 145 K/UL (ref 130–450)
PMV BLD AUTO: 10.7 FL (ref 7–12)
POTASSIUM SERPL-SCNC: 3.9 MMOL/L (ref 3.5–5)
PROMYELOCYTES ABSOLUTE COUNT: 0.37 K/UL
PROMYELOCYTES: 3 %
PROT SERPL-MCNC: 6.5 G/DL (ref 6.4–8.3)
RBC # BLD AUTO: 3.76 M/UL (ref 3.8–5.8)
RBC # BLD: ABNORMAL 10*6/UL
SODIUM SERPL-SCNC: 136 MMOL/L (ref 132–146)
WBC OTHER # BLD: 14.2 K/UL (ref 4.5–11.5)

## 2024-12-18 PROCEDURE — 2580000003 HC RX 258: Performed by: HOSPITALIST

## 2024-12-18 PROCEDURE — 84100 ASSAY OF PHOSPHORUS: CPT

## 2024-12-18 PROCEDURE — 80053 COMPREHEN METABOLIC PANEL: CPT

## 2024-12-18 PROCEDURE — 2500000003 HC RX 250 WO HCPCS: Performed by: HOSPITALIST

## 2024-12-18 PROCEDURE — 85025 COMPLETE CBC W/AUTO DIFF WBC: CPT

## 2024-12-18 PROCEDURE — 36415 COLL VENOUS BLD VENIPUNCTURE: CPT

## 2024-12-18 PROCEDURE — 2060000000 HC ICU INTERMEDIATE R&B

## 2024-12-18 PROCEDURE — 97530 THERAPEUTIC ACTIVITIES: CPT

## 2024-12-18 PROCEDURE — 6360000002 HC RX W HCPCS: Performed by: HOSPITALIST

## 2024-12-18 PROCEDURE — 83605 ASSAY OF LACTIC ACID: CPT

## 2024-12-18 PROCEDURE — 6370000000 HC RX 637 (ALT 250 FOR IP): Performed by: NURSE PRACTITIONER

## 2024-12-18 PROCEDURE — 6370000000 HC RX 637 (ALT 250 FOR IP)

## 2024-12-18 PROCEDURE — 2700000000 HC OXYGEN THERAPY PER DAY

## 2024-12-18 PROCEDURE — 99223 1ST HOSP IP/OBS HIGH 75: CPT | Performed by: NURSE PRACTITIONER

## 2024-12-18 PROCEDURE — 1200000000 HC SEMI PRIVATE

## 2024-12-18 PROCEDURE — 6370000000 HC RX 637 (ALT 250 FOR IP): Performed by: HOSPITALIST

## 2024-12-18 PROCEDURE — 97161 PT EVAL LOW COMPLEX 20 MIN: CPT

## 2024-12-18 RX ORDER — ENOXAPARIN SODIUM 100 MG/ML
1 INJECTION SUBCUTANEOUS DAILY
Status: DISCONTINUED | OUTPATIENT
Start: 2024-12-19 | End: 2024-12-18 | Stop reason: DRUGHIGH

## 2024-12-18 RX ORDER — ENOXAPARIN SODIUM 100 MG/ML
1 INJECTION SUBCUTANEOUS 2 TIMES DAILY
Status: DISCONTINUED | OUTPATIENT
Start: 2024-12-19 | End: 2024-12-19

## 2024-12-18 RX ORDER — SENNA AND DOCUSATE SODIUM 50; 8.6 MG/1; MG/1
1 TABLET, FILM COATED ORAL DAILY
Status: DISCONTINUED | OUTPATIENT
Start: 2024-12-18 | End: 2024-12-19

## 2024-12-18 RX ORDER — DRONABINOL 2.5 MG/1
2.5 CAPSULE ORAL 2 TIMES DAILY
Status: DISCONTINUED | OUTPATIENT
Start: 2024-12-18 | End: 2024-12-23

## 2024-12-18 RX ORDER — OXYCODONE HYDROCHLORIDE 5 MG/1
10 TABLET ORAL EVERY 4 HOURS PRN
Status: DISCONTINUED | OUTPATIENT
Start: 2024-12-18 | End: 2024-12-26 | Stop reason: HOSPADM

## 2024-12-18 RX ORDER — GABAPENTIN 300 MG/1
300 CAPSULE ORAL 3 TIMES DAILY
Status: DISCONTINUED | OUTPATIENT
Start: 2024-12-18 | End: 2024-12-26 | Stop reason: HOSPADM

## 2024-12-18 RX ADMIN — SALINE NASAL SPRAY 1 SPRAY: 1.5 SOLUTION NASAL at 10:33

## 2024-12-18 RX ADMIN — SODIUM CHLORIDE, PRESERVATIVE FREE 10 ML: 5 INJECTION INTRAVENOUS at 21:07

## 2024-12-18 RX ADMIN — HYDROMORPHONE HYDROCHLORIDE 0.5 MG: 1 INJECTION, SOLUTION INTRAMUSCULAR; INTRAVENOUS; SUBCUTANEOUS at 10:47

## 2024-12-18 RX ADMIN — HYDROMORPHONE HYDROCHLORIDE 0.5 MG: 1 INJECTION, SOLUTION INTRAMUSCULAR; INTRAVENOUS; SUBCUTANEOUS at 16:54

## 2024-12-18 RX ADMIN — BACLOFEN 10 MG: 10 TABLET ORAL at 10:32

## 2024-12-18 RX ADMIN — DOCUSATE SODIUM 50 MG AND SENNOSIDES 8.6 MG 1 TABLET: 8.6; 5 TABLET, FILM COATED ORAL at 14:00

## 2024-12-18 RX ADMIN — BACLOFEN 10 MG: 10 TABLET ORAL at 14:00

## 2024-12-18 RX ADMIN — DRONABINOL 2.5 MG: 2.5 CAPSULE ORAL at 10:32

## 2024-12-18 RX ADMIN — Medication 2000 UNITS: at 10:32

## 2024-12-18 RX ADMIN — GABAPENTIN 300 MG: 300 CAPSULE ORAL at 14:00

## 2024-12-18 RX ADMIN — ENOXAPARIN SODIUM 40 MG: 100 INJECTION SUBCUTANEOUS at 10:31

## 2024-12-18 RX ADMIN — SODIUM CHLORIDE, PRESERVATIVE FREE 10 ML: 5 INJECTION INTRAVENOUS at 10:37

## 2024-12-18 RX ADMIN — SODIUM CHLORIDE: 9 INJECTION, SOLUTION INTRAVENOUS at 10:31

## 2024-12-18 ASSESSMENT — PAIN DESCRIPTION - LOCATION
LOCATION: BACK

## 2024-12-18 ASSESSMENT — PAIN SCALES - GENERAL
PAINLEVEL_OUTOF10: 3
PAINLEVEL_OUTOF10: 8
PAINLEVEL_OUTOF10: 8
PAINLEVEL_OUTOF10: 0

## 2024-12-18 ASSESSMENT — PAIN DESCRIPTION - DESCRIPTORS
DESCRIPTORS: ACHING
DESCRIPTORS: SORE;SHOOTING;STABBING

## 2024-12-18 NOTE — H&P
sodium chloride flush 0.9 % injection 5-40 mL (10 mLs IntraVENous Given 12/17/24 2022)   sodium chloride flush 0.9 % injection 5-40 mL (has no administration in time range)   0.9 % sodium chloride infusion (has no administration in time range)   potassium chloride (KLOR-CON M) extended release tablet 40 mEq (has no administration in time range)     Or   potassium bicarb-citric acid (EFFER-K) effervescent tablet 40 mEq (has no administration in time range)     Or   potassium chloride 10 mEq/100 mL IVPB (Peripheral Line) (has no administration in time range)   magnesium sulfate 2000 mg in 50 mL IVPB premix (has no administration in time range)   enoxaparin (LOVENOX) injection 40 mg (40 mg SubCUTAneous Given 12/17/24 2022)   ondansetron (ZOFRAN-ODT) disintegrating tablet 4 mg (has no administration in time range)     Or   ondansetron (ZOFRAN) injection 4 mg (has no administration in time range)   polyethylene glycol (GLYCOLAX) packet 17 g (has no administration in time range)   acetaminophen (TYLENOL) tablet 650 mg (has no administration in time range)     Or   acetaminophen (TYLENOL) suppository 650 mg (has no administration in time range)   0.9 % sodium chloride infusion ( IntraVENous Rate/Dose Verify 12/18/24 0508)   baclofen (LIORESAL) tablet 10 mg (10 mg Oral Given 12/17/24 2022)   HYDROmorphone (DILAUDID) injection 0.5 mg (0.5 mg IntraMUSCular Given 12/17/24 1138)   HYDROmorphone (DILAUDID) injection 0.5 mg (0.5 mg IntraVENous Given 12/17/24 1623)   iopamidol (ISOVUE-370) 76 % injection 75 mL (75 mLs IntraVENous Given 12/17/24 1719)       Past Medical History:   Diagnosis Date    Adenocarcinoma, lung, right (HCC) 04/05/2024    Cancer (HCC)     COPD (chronic obstructive pulmonary disease) (HCC)     Hyperlipidemia     Secondary cancer of bone (HCC) 04/19/2024    T9 lytic lesion on PET/CT    Secondary cancer of brain (HCC) 08/28/2024    Status post stereotactic radiosurgery 09/10/2024    S/p SRS to left anterior

## 2024-12-18 NOTE — CARE COORDINATION
Met with patient about diagnosis and discharge plan of care. Pt admit for intractable back pain. Pt has hx of lung ca with mets to bone and pain meds have not been working. Pt is active at [facility] for chemo every 3 weeks. Pt wears 2-3 L/NC at home, cannot remember company. Pt has wheeled walker. Lives on 1st floor apt. PCP is Dr Awadalla and he follows at [facility]. Discussed hospice consult and choices. Pt DOES NOT WANT TO SEE HOSPICE. Palliative care consult placed for now. Pt states he ambulates and uses wheeled walker, prn. Will need to continue to follow for needs-o

## 2024-12-18 NOTE — ED NOTES
ED to Inpatient Handoff Report    Notified Jodee that electronic handoff available and patient ready for transport to room 725.    Safety Risks: Risk of falls    Patient in Restraints: no    Constant Observer or Patient : no    Telemetry Monitoring Ordered :No           Order to transfer to unit without monitor:YES    Last MEWS: 1 Time completed: 1908    Deterioration Index Score:   Predictive Model Details          30  Factor Value    Calculated 12/17/2024 19:26 41% Age 79 years old    Deterioration Index Model 37% Supplemental oxygen Nasal cannula     8% Potassium 4.4 mmol/L     4% Pulse 96     3% Pulse oximetry 93 %     3% Systolic 129     3% WBC count 9.6 k/uL     1% Sodium 137 mmol/L     0% Respiratory rate 16     0% Hematocrit 40.0 %     0% Temperature 98.3 °F (36.8 °C)        Vitals:    12/17/24 1029 12/17/24 1030 12/17/24 1908   BP:  (!) 100/90 129/71   Pulse:  (!) 103 96   Resp:  16 16   Temp:  97 °F (36.1 °C) 98.3 °F (36.8 °C)   TempSrc:  Oral Oral   SpO2:  98% 93%   Weight: 79.4 kg (175 lb)     Height: 1.651 m (5' 5\")           Opportunity for questions and clarification was provided.

## 2024-12-18 NOTE — ACP (ADVANCE CARE PLANNING)
Advance Care Planning   Healthcare Decision Maker:    Primary Decision Maker: Lena Stauffer (Cousin) - Other Relative - [phone]    Secondary Decision Maker: Veronica Velasco - Friend - [phone]    Click here to complete Healthcare Decision Makers including selection of the Healthcare Decision Maker Relationship (ie \"Primary\").

## 2024-12-18 NOTE — PLAN OF CARE

## 2024-12-19 LAB
ALBUMIN SERPL-MCNC: 3.1 G/DL (ref 3.5–5.2)
ALP SERPL-CCNC: 126 U/L (ref 40–129)
ALT SERPL-CCNC: 14 U/L (ref 0–40)
ANION GAP SERPL CALCULATED.3IONS-SCNC: 9 MMOL/L (ref 7–16)
AST SERPL-CCNC: 21 U/L (ref 0–39)
BASOPHILS # BLD: 0 K/UL (ref 0–0.2)
BASOPHILS NFR BLD: 0 % (ref 0–2)
BILIRUB SERPL-MCNC: 0.4 MG/DL (ref 0–1.2)
BLASTS ABSOLUTE COUNT: 0.31 K/UL
BLASTS: 2 %
BUN SERPL-MCNC: 12 MG/DL (ref 6–23)
CALCIUM SERPL-MCNC: 8.3 MG/DL (ref 8.6–10.2)
CHLORIDE SERPL-SCNC: 107 MMOL/L (ref 98–107)
CO2 SERPL-SCNC: 22 MMOL/L (ref 22–29)
CREAT SERPL-MCNC: 0.8 MG/DL (ref 0.7–1.2)
EOSINOPHIL # BLD: 0.31 K/UL (ref 0.05–0.5)
EOSINOPHILS RELATIVE PERCENT: 2 % (ref 0–6)
ERYTHROCYTE [DISTWIDTH] IN BLOOD BY AUTOMATED COUNT: 16.6 % (ref 11.5–15)
ERYTHROCYTE [DISTWIDTH] IN BLOOD BY AUTOMATED COUNT: 16.7 % (ref 11.5–15)
GFR, ESTIMATED: 89 ML/MIN/1.73M2
GLUCOSE SERPL-MCNC: 101 MG/DL (ref 74–99)
HCT VFR BLD AUTO: 35 % (ref 37–54)
HCT VFR BLD AUTO: 36.1 % (ref 37–54)
HGB BLD-MCNC: 11.2 G/DL (ref 12.5–16.5)
HGB BLD-MCNC: 11.6 G/DL (ref 12.5–16.5)
LYMPHOCYTES NFR BLD: 0.77 K/UL (ref 1.5–4)
LYMPHOCYTES RELATIVE PERCENT: 4 % (ref 20–42)
MCH RBC QN AUTO: 28.7 PG (ref 26–35)
MCH RBC QN AUTO: 28.9 PG (ref 26–35)
MCHC RBC AUTO-ENTMCNC: 32 G/DL (ref 32–34.5)
MCHC RBC AUTO-ENTMCNC: 32.1 G/DL (ref 32–34.5)
MCV RBC AUTO: 89.4 FL (ref 80–99.9)
MCV RBC AUTO: 90.2 FL (ref 80–99.9)
MONOCYTES NFR BLD: 0 % (ref 2–12)
MONOCYTES NFR BLD: 0 K/UL (ref 0.1–0.95)
MYELOCYTES ABSOLUTE COUNT: 0.15 K/UL
MYELOCYTES: 1 %
NEUTROPHILS NFR BLD: 90 % (ref 43–80)
NEUTS SEG NFR BLD: 15.94 K/UL (ref 1.8–7.3)
PARTIAL THROMBOPLASTIN TIME: 27.1 SEC (ref 24.5–35.1)
PARTIAL THROMBOPLASTIN TIME: 80.5 SEC (ref 24.5–35.1)
PLATELET # BLD AUTO: 164 K/UL (ref 130–450)
PLATELET # BLD AUTO: 166 K/UL (ref 130–450)
PMV BLD AUTO: 10 FL (ref 7–12)
PMV BLD AUTO: 9.8 FL (ref 7–12)
POTASSIUM SERPL-SCNC: 3.8 MMOL/L (ref 3.5–5)
PROMYELOCYTES ABSOLUTE COUNT: 0.31 K/UL
PROMYELOCYTES: 2 %
PROT SERPL-MCNC: 6.5 G/DL (ref 6.4–8.3)
RBC # BLD AUTO: 3.88 M/UL (ref 3.8–5.8)
RBC # BLD AUTO: 4.04 M/UL (ref 3.8–5.8)
RBC # BLD: ABNORMAL 10*6/UL
SODIUM SERPL-SCNC: 138 MMOL/L (ref 132–146)
WBC OTHER # BLD: 17.8 K/UL (ref 4.5–11.5)
WBC OTHER # BLD: 24.7 K/UL (ref 4.5–11.5)

## 2024-12-19 PROCEDURE — 2700000000 HC OXYGEN THERAPY PER DAY

## 2024-12-19 PROCEDURE — 6370000000 HC RX 637 (ALT 250 FOR IP): Performed by: HOSPITALIST

## 2024-12-19 PROCEDURE — 36415 COLL VENOUS BLD VENIPUNCTURE: CPT

## 2024-12-19 PROCEDURE — 6370000000 HC RX 637 (ALT 250 FOR IP): Performed by: NURSE PRACTITIONER

## 2024-12-19 PROCEDURE — 6360000002 HC RX W HCPCS: Performed by: INTERNAL MEDICINE

## 2024-12-19 PROCEDURE — 2500000003 HC RX 250 WO HCPCS: Performed by: HOSPITALIST

## 2024-12-19 PROCEDURE — 85730 THROMBOPLASTIN TIME PARTIAL: CPT

## 2024-12-19 PROCEDURE — 2060000000 HC ICU INTERMEDIATE R&B

## 2024-12-19 PROCEDURE — 6370000000 HC RX 637 (ALT 250 FOR IP)

## 2024-12-19 PROCEDURE — 6370000000 HC RX 637 (ALT 250 FOR IP): Performed by: INTERNAL MEDICINE

## 2024-12-19 PROCEDURE — 80053 COMPREHEN METABOLIC PANEL: CPT

## 2024-12-19 PROCEDURE — 85027 COMPLETE CBC AUTOMATED: CPT

## 2024-12-19 PROCEDURE — 99231 SBSQ HOSP IP/OBS SF/LOW 25: CPT

## 2024-12-19 PROCEDURE — 1200000000 HC SEMI PRIVATE

## 2024-12-19 PROCEDURE — 85025 COMPLETE CBC W/AUTO DIFF WBC: CPT

## 2024-12-19 PROCEDURE — 97165 OT EVAL LOW COMPLEX 30 MIN: CPT

## 2024-12-19 RX ORDER — HEPARIN SODIUM 1000 [USP'U]/ML
40 INJECTION, SOLUTION INTRAVENOUS; SUBCUTANEOUS PRN
Status: DISCONTINUED | OUTPATIENT
Start: 2024-12-19 | End: 2024-12-25

## 2024-12-19 RX ORDER — HEPARIN SODIUM 10000 [USP'U]/100ML
5-30 INJECTION, SOLUTION INTRAVENOUS CONTINUOUS
Status: DISCONTINUED | OUTPATIENT
Start: 2024-12-19 | End: 2024-12-25

## 2024-12-19 RX ORDER — IPRATROPIUM BROMIDE AND ALBUTEROL SULFATE 2.5; .5 MG/3ML; MG/3ML
1 SOLUTION RESPIRATORY (INHALATION) EVERY 6 HOURS PRN
Status: DISCONTINUED | OUTPATIENT
Start: 2024-12-19 | End: 2024-12-26 | Stop reason: HOSPADM

## 2024-12-19 RX ORDER — CHOLESTYRAMINE 4 G/9G
1 POWDER, FOR SUSPENSION ORAL 2 TIMES DAILY
Status: DISCONTINUED | OUTPATIENT
Start: 2024-12-19 | End: 2024-12-24

## 2024-12-19 RX ORDER — BACLOFEN 10 MG/1
5 TABLET ORAL 3 TIMES DAILY
Status: DISCONTINUED | OUTPATIENT
Start: 2024-12-19 | End: 2024-12-26 | Stop reason: HOSPADM

## 2024-12-19 RX ORDER — HEPARIN SODIUM 1000 [USP'U]/ML
80 INJECTION, SOLUTION INTRAVENOUS; SUBCUTANEOUS PRN
Status: DISCONTINUED | OUTPATIENT
Start: 2024-12-19 | End: 2024-12-25

## 2024-12-19 RX ADMIN — SODIUM CHLORIDE, PRESERVATIVE FREE 10 ML: 5 INJECTION INTRAVENOUS at 19:58

## 2024-12-19 RX ADMIN — GABAPENTIN 300 MG: 300 CAPSULE ORAL at 14:10

## 2024-12-19 RX ADMIN — GABAPENTIN 300 MG: 300 CAPSULE ORAL at 08:38

## 2024-12-19 RX ADMIN — Medication 2000 UNITS: at 08:38

## 2024-12-19 RX ADMIN — DRONABINOL 2.5 MG: 2.5 CAPSULE ORAL at 19:53

## 2024-12-19 RX ADMIN — HEPARIN SODIUM 18 UNITS/KG/HR: 10000 INJECTION, SOLUTION INTRAVENOUS at 12:34

## 2024-12-19 RX ADMIN — BACLOFEN 5 MG: 10 TABLET ORAL at 14:09

## 2024-12-19 RX ADMIN — BACLOFEN 5 MG: 10 TABLET ORAL at 19:53

## 2024-12-19 RX ADMIN — DRONABINOL 2.5 MG: 2.5 CAPSULE ORAL at 08:38

## 2024-12-19 RX ADMIN — CHOLESTYRAMINE 4 G: 4 POWDER, FOR SUSPENSION ORAL at 19:53

## 2024-12-19 RX ADMIN — GABAPENTIN 300 MG: 300 CAPSULE ORAL at 20:30

## 2024-12-19 RX ADMIN — CHOLESTYRAMINE 4 G: 4 POWDER, FOR SUSPENSION ORAL at 08:39

## 2024-12-19 RX ADMIN — SODIUM CHLORIDE, PRESERVATIVE FREE 10 ML: 5 INJECTION INTRAVENOUS at 08:39

## 2024-12-19 RX ADMIN — SALINE NASAL SPRAY 1 SPRAY: 1.5 SOLUTION NASAL at 14:10

## 2024-12-19 ASSESSMENT — PAIN DESCRIPTION - LOCATION: LOCATION: BACK

## 2024-12-19 ASSESSMENT — PAIN SCALES - WONG BAKER
WONGBAKER_NUMERICALRESPONSE: NO HURT
WONGBAKER_NUMERICALRESPONSE: NO HURT

## 2024-12-19 ASSESSMENT — PAIN DESCRIPTION - ORIENTATION: ORIENTATION: MID

## 2024-12-19 ASSESSMENT — PAIN SCALES - GENERAL
PAINLEVEL_OUTOF10: 2
PAINLEVEL_OUTOF10: 4
PAINLEVEL_OUTOF10: 1
PAINLEVEL_OUTOF10: 1

## 2024-12-19 ASSESSMENT — PAIN - FUNCTIONAL ASSESSMENT: PAIN_FUNCTIONAL_ASSESSMENT: PREVENTS OR INTERFERES SOME ACTIVE ACTIVITIES AND ADLS

## 2024-12-19 ASSESSMENT — PAIN DESCRIPTION - DESCRIPTORS: DESCRIPTORS: ACHING

## 2024-12-19 NOTE — PLAN OF CARE
Problem: ABCDS Injury Assessment  Goal: Absence of physical injury  Outcome: Progressing     Problem: Skin/Tissue Integrity  Goal: Absence of new skin breakdown  Description: 1.  Monitor for areas of redness and/or skin breakdown  2.  Assess vascular access sites hourly  3.  Every 4-6 hours minimum:  Change oxygen saturation probe site  4.  Every 4-6 hours:  If on nasal continuous positive airway pressure, respiratory therapy assess nares and determine need for appliance change or resting period.  Outcome: Progressing     Problem: Discharge Planning  Goal: Discharge to home or other facility with appropriate resources  Outcome: Progressing  Flowsheets (Taken 12/18/2024 2000)  Discharge to home or other facility with appropriate resources: Refer to discharge planning if patient needs post-hospital services based on physician order or complex needs related to functional status, cognitive ability or social support system     Problem: Pain  Goal: Verbalizes/displays adequate comfort level or baseline comfort level  Outcome: Progressing     Problem: Safety - Adult  Goal: Free from fall injury  Outcome: Progressing

## 2024-12-19 NOTE — CARE COORDINATION
Soil Work / Discharge Planning : SW followed up with patient and Lena cousin and contact. Patient plan at this time is HOME.Therapy am/pac 20/24. Patient deciding on Pleur-X cath placement per Pulmonology recommendation. Cousin updated SW that [facility] called her this am and she cancelled them but now understands they need to follow if patient does return home. SW called Monica from [place] and updated. Monica from [facility] is following. Patient has a hx at [facility]. Cousin inquiring about SNF first then [facility]. SNF and insurance discussed at length. Cousin and RN nd expressed an understanding. Son will be visiting this weekend and they will discuss possible SNF( Private pay if denied )vs Home with [facility]. Patient does NOT want Hospice at this time. SW to follow. Electronically signed by INDIRA Weems on 12/19/24 at 10:39 AM EST     Addendum: SW received a follow visit from  Lena patient cousin and first contact. She updated SW they spoke to patient agreeable for SNF for at least 7 days and then home with [facility]. SNF choices discussed and they would like [facility]. Referral called to [place]. Await if they can accept. Patient cannot get treatment outpatient during SNF stay. Pre-cert will be needed. If denies, will pay privately.SW to follow. Electronically signed by INDIRA Weems on 12/19/24 at 3:10 PM EST

## 2024-12-20 ENCOUNTER — APPOINTMENT (OUTPATIENT)
Dept: ULTRASOUND IMAGING | Age: 79
DRG: 542 | End: 2024-12-20
Payer: MEDICARE

## 2024-12-20 LAB
ALBUMIN SERPL-MCNC: 3.1 G/DL (ref 3.5–5.2)
ALP SERPL-CCNC: 218 U/L (ref 40–129)
ALT SERPL-CCNC: 13 U/L (ref 0–40)
ANION GAP SERPL CALCULATED.3IONS-SCNC: 10 MMOL/L (ref 7–16)
AST SERPL-CCNC: 20 U/L (ref 0–39)
BASOPHILS # BLD: 0 K/UL (ref 0–0.2)
BASOPHILS NFR BLD: 0 % (ref 0–2)
BILIRUB SERPL-MCNC: 0.3 MG/DL (ref 0–1.2)
BUN SERPL-MCNC: 9 MG/DL (ref 6–23)
CALCIUM SERPL-MCNC: 8.2 MG/DL (ref 8.6–10.2)
CHLORIDE SERPL-SCNC: 107 MMOL/L (ref 98–107)
CO2 SERPL-SCNC: 22 MMOL/L (ref 22–29)
CREAT SERPL-MCNC: 0.7 MG/DL (ref 0.7–1.2)
EOSINOPHIL # BLD: 0 K/UL (ref 0.05–0.5)
EOSINOPHILS RELATIVE PERCENT: 0 % (ref 0–6)
ERYTHROCYTE [DISTWIDTH] IN BLOOD BY AUTOMATED COUNT: 16.9 % (ref 11.5–15)
GFR, ESTIMATED: >90 ML/MIN/1.73M2
GLUCOSE SERPL-MCNC: 117 MG/DL (ref 74–99)
HCT VFR BLD AUTO: 33.2 % (ref 37–54)
HGB BLD-MCNC: 10.7 G/DL (ref 12.5–16.5)
INR PPP: 1.3
LYMPHOCYTES NFR BLD: 1.76 K/UL (ref 1.5–4)
LYMPHOCYTES RELATIVE PERCENT: 8 % (ref 20–42)
MCH RBC QN AUTO: 28.8 PG (ref 26–35)
MCHC RBC AUTO-ENTMCNC: 32.2 G/DL (ref 32–34.5)
MCV RBC AUTO: 89.5 FL (ref 80–99.9)
MONOCYTES NFR BLD: 1.37 K/UL (ref 0.1–0.95)
MONOCYTES NFR BLD: 6 % (ref 2–12)
MYELOCYTES ABSOLUTE COUNT: 0.59 K/UL
MYELOCYTES: 3 %
NEUTROPHILS NFR BLD: 84 % (ref 43–80)
NEUTS SEG NFR BLD: 19.18 K/UL (ref 1.8–7.3)
PARTIAL THROMBOPLASTIN TIME: 30 SEC (ref 24.5–35.1)
PARTIAL THROMBOPLASTIN TIME: 86.8 SEC (ref 24.5–35.1)
PARTIAL THROMBOPLASTIN TIME: 92.5 SEC (ref 24.5–35.1)
PLATELET # BLD AUTO: 166 K/UL (ref 130–450)
PMV BLD AUTO: 9.8 FL (ref 7–12)
POTASSIUM SERPL-SCNC: 3.6 MMOL/L (ref 3.5–5)
PROT SERPL-MCNC: 6.3 G/DL (ref 6.4–8.3)
PROTHROMBIN TIME: 13.4 SEC (ref 9.3–12.4)
RBC # BLD AUTO: 3.71 M/UL (ref 3.8–5.8)
RBC # BLD: ABNORMAL 10*6/UL
SODIUM SERPL-SCNC: 139 MMOL/L (ref 132–146)
WBC OTHER # BLD: 22.9 K/UL (ref 4.5–11.5)

## 2024-12-20 PROCEDURE — 2500000003 HC RX 250 WO HCPCS: Performed by: HOSPITALIST

## 2024-12-20 PROCEDURE — 99231 SBSQ HOSP IP/OBS SF/LOW 25: CPT

## 2024-12-20 PROCEDURE — 02HV33Z INSERTION OF INFUSION DEVICE INTO SUPERIOR VENA CAVA, PERCUTANEOUS APPROACH: ICD-10-PCS | Performed by: STUDENT IN AN ORGANIZED HEALTH CARE EDUCATION/TRAINING PROGRAM

## 2024-12-20 PROCEDURE — 85610 PROTHROMBIN TIME: CPT

## 2024-12-20 PROCEDURE — 2709999900 US INSERT TUNNELED INTRAPERITONEAL CATH W OR WO CONTRAST PERC S&I

## 2024-12-20 PROCEDURE — 0JH63XZ INSERTION OF TUNNELED VASCULAR ACCESS DEVICE INTO CHEST SUBCUTANEOUS TISSUE AND FASCIA, PERCUTANEOUS APPROACH: ICD-10-PCS | Performed by: STUDENT IN AN ORGANIZED HEALTH CARE EDUCATION/TRAINING PROGRAM

## 2024-12-20 PROCEDURE — 6370000000 HC RX 637 (ALT 250 FOR IP): Performed by: HOSPITALIST

## 2024-12-20 PROCEDURE — 2500000003 HC RX 250 WO HCPCS: Performed by: RADIOLOGY

## 2024-12-20 PROCEDURE — 6360000002 HC RX W HCPCS: Performed by: RADIOLOGY

## 2024-12-20 PROCEDURE — 1200000000 HC SEMI PRIVATE

## 2024-12-20 PROCEDURE — 6370000000 HC RX 637 (ALT 250 FOR IP): Performed by: NURSE PRACTITIONER

## 2024-12-20 PROCEDURE — 2700000000 HC OXYGEN THERAPY PER DAY

## 2024-12-20 PROCEDURE — 80053 COMPREHEN METABOLIC PANEL: CPT

## 2024-12-20 PROCEDURE — 85730 THROMBOPLASTIN TIME PARTIAL: CPT

## 2024-12-20 PROCEDURE — 6360000002 HC RX W HCPCS: Performed by: INTERNAL MEDICINE

## 2024-12-20 PROCEDURE — 6370000000 HC RX 637 (ALT 250 FOR IP)

## 2024-12-20 PROCEDURE — 2060000000 HC ICU INTERMEDIATE R&B

## 2024-12-20 PROCEDURE — 97530 THERAPEUTIC ACTIVITIES: CPT

## 2024-12-20 PROCEDURE — 85025 COMPLETE CBC W/AUTO DIFF WBC: CPT

## 2024-12-20 RX ORDER — MIDAZOLAM HYDROCHLORIDE 2 MG/2ML
INJECTION, SOLUTION INTRAMUSCULAR; INTRAVENOUS PRN
Status: COMPLETED | OUTPATIENT
Start: 2024-12-20 | End: 2024-12-20

## 2024-12-20 RX ORDER — FENTANYL CITRATE 50 UG/ML
INJECTION, SOLUTION INTRAMUSCULAR; INTRAVENOUS PRN
Status: COMPLETED | OUTPATIENT
Start: 2024-12-20 | End: 2024-12-20

## 2024-12-20 RX ORDER — FLUTICASONE PROPIONATE 50 MCG
1 SPRAY, SUSPENSION (ML) NASAL DAILY PRN
Status: DISCONTINUED | OUTPATIENT
Start: 2024-12-20 | End: 2024-12-26 | Stop reason: HOSPADM

## 2024-12-20 RX ORDER — LIDOCAINE HYDROCHLORIDE 20 MG/ML
INJECTION, SOLUTION INFILTRATION; PERINEURAL PRN
Status: COMPLETED | OUTPATIENT
Start: 2024-12-20 | End: 2024-12-20

## 2024-12-20 RX ADMIN — WATER 2000 MG: 1 INJECTION INTRAMUSCULAR; INTRAVENOUS; SUBCUTANEOUS at 16:20

## 2024-12-20 RX ADMIN — Medication 2000 UNITS: at 09:47

## 2024-12-20 RX ADMIN — HEPARIN SODIUM 14 UNITS/KG/HR: 10000 INJECTION, SOLUTION INTRAVENOUS at 19:32

## 2024-12-20 RX ADMIN — BACLOFEN 5 MG: 10 TABLET ORAL at 21:40

## 2024-12-20 RX ADMIN — LIDOCAINE HYDROCHLORIDE 10 ML: 20 INJECTION, SOLUTION INFILTRATION; PERINEURAL at 16:29

## 2024-12-20 RX ADMIN — HEPARIN SODIUM 18 UNITS/KG/HR: 10000 INJECTION, SOLUTION INTRAVENOUS at 20:56

## 2024-12-20 RX ADMIN — MIDAZOLAM HYDROCHLORIDE 1 MG: 1 INJECTION, SOLUTION INTRAMUSCULAR; INTRAVENOUS at 16:29

## 2024-12-20 RX ADMIN — DRONABINOL 2.5 MG: 2.5 CAPSULE ORAL at 09:47

## 2024-12-20 RX ADMIN — BACLOFEN 5 MG: 10 TABLET ORAL at 09:47

## 2024-12-20 RX ADMIN — SALINE NASAL SPRAY 1 SPRAY: 1.5 SOLUTION NASAL at 17:27

## 2024-12-20 RX ADMIN — GABAPENTIN 300 MG: 300 CAPSULE ORAL at 09:47

## 2024-12-20 RX ADMIN — CHOLESTYRAMINE 4 G: 4 POWDER, FOR SUSPENSION ORAL at 21:39

## 2024-12-20 RX ADMIN — SODIUM CHLORIDE, PRESERVATIVE FREE 10 ML: 5 INJECTION INTRAVENOUS at 21:43

## 2024-12-20 RX ADMIN — GABAPENTIN 300 MG: 300 CAPSULE ORAL at 21:40

## 2024-12-20 RX ADMIN — CHOLESTYRAMINE 4 G: 4 POWDER, FOR SUSPENSION ORAL at 09:47

## 2024-12-20 RX ADMIN — DRONABINOL 2.5 MG: 2.5 CAPSULE ORAL at 21:40

## 2024-12-20 RX ADMIN — FENTANYL CITRATE 50 MCG: 50 INJECTION, SOLUTION INTRAMUSCULAR; INTRAVENOUS at 16:29

## 2024-12-20 RX ADMIN — HEPARIN SODIUM 6400 UNITS: 1000 INJECTION INTRAVENOUS; SUBCUTANEOUS at 20:48

## 2024-12-20 RX ADMIN — HEPARIN SODIUM 16 UNITS/KG/HR: 10000 INJECTION, SOLUTION INTRAVENOUS at 06:44

## 2024-12-20 ASSESSMENT — PAIN SCALES - GENERAL
PAINLEVEL_OUTOF10: 3
PAINLEVEL_OUTOF10: 0
PAINLEVEL_OUTOF10: 3
PAINLEVEL_OUTOF10: 1

## 2024-12-20 ASSESSMENT — PAIN - FUNCTIONAL ASSESSMENT: PAIN_FUNCTIONAL_ASSESSMENT: PREVENTS OR INTERFERES SOME ACTIVE ACTIVITIES AND ADLS

## 2024-12-20 ASSESSMENT — PAIN DESCRIPTION - ORIENTATION
ORIENTATION: RIGHT;LOWER
ORIENTATION: RIGHT

## 2024-12-20 ASSESSMENT — PAIN DESCRIPTION - LOCATION
LOCATION: BACK
LOCATION: CHEST

## 2024-12-20 ASSESSMENT — PAIN DESCRIPTION - DESCRIPTORS
DESCRIPTORS: ACHING
DESCRIPTORS: ACHING;DISCOMFORT;SORE

## 2024-12-20 NOTE — DISCHARGE INSTR - COC
Continuity of Care Form    Patient Name: Terell Martinez   :  1945  MRN:  [id number]    Admit date:  2024  Discharge date:  2024    Code Status Order: Limited   Advance Directives:   Advance Care Flowsheet Documentation             Admitting Physician:  Nicolas Mcgovern DO  PCP: Awadalla, Maged I, MD    Discharging Nurse: Francesca Sutherland RN  Discharging Hospital Unit/Room#: 0731/0731-A  Discharging Unit Phone Number: [phone]    Emergency Contact:   Extended Emergency Contact Information  Primary Emergency Contact: Lena Stauffer (Cousin)  Home Phone: [phone]  Mobile Phone: [phone]  Relation: Other Relative  Secondary Emergency Contact: Veronica Velasco  Mobile Phone: [phone]  Relation: Friend    Past Surgical History:  Past Surgical History:   Procedure Laterality Date    BACK SURGERY      CATARACT EXTRACTION Bilateral 2024  left eye and 2024 right eye.    COLONOSCOPY      CT NEEDLE BIOPSY LUNG PERCUTANEOUS W IMAGING GUIDANCE  2024    CT NEEDLE BIOPSY LUNG PERCUTANEOUS 2024 [facility] CT    EYE SURGERY      HERNIA REPAIR      JOINT REPLACEMENT      TONSILLECTOMY      TOTAL KNEE ARTHROPLASTY Left        Immunization History:   Immunization History   Administered Date(s) Administered    COVID-19, PFIZER GRAY top, DO NOT Dilute, (age 12 y+), IM, 30 mcg/0.3 mL 2022    COVID-19, PFIZER PURPLE top, DILUTE for use, (age 12 y+), 30mcg/0.3mL 2021, 2021       Active Problems:  Patient Active Problem List   Diagnosis Code    Renal calculus, right N20.0    Pulmonary nodules R91.8    Pleural effusion on right J90    Pleural effusion J90    Adenocarcinoma, lung, right (HCC) C34.91    Metastasis to brain (HCC) C79.31    Status post stereotactic radiosurgery Z92.3    Hyperlipidemia E78.5    COPD (chronic obstructive pulmonary disease) (HCC) J44.9    Moderate protein-calorie malnutrition (HCC) E44.0    Pneumonia due to organism J18.9    Intractable pain R52

## 2024-12-20 NOTE — PLAN OF CARE
Problem: ABCDS Injury Assessment  Goal: Absence of physical injury  Outcome: Progressing     Problem: Skin/Tissue Integrity  Goal: Absence of new skin breakdown  Description: 1.  Monitor for areas of redness and/or skin breakdown  2.  Assess vascular access sites hourly  3.  Every 4-6 hours minimum:  Change oxygen saturation probe site  4.  Every 4-6 hours:  If on nasal continuous positive airway pressure, respiratory therapy assess nares and determine need for appliance change or resting period.  Outcome: Progressing     Problem: Discharge Planning  Goal: Discharge to home or other facility with appropriate resources  Outcome: Progressing     Problem: Pain  Goal: Verbalizes/displays adequate comfort level or baseline comfort level  Outcome: Progressing     Problem: Safety - Adult  Goal: Free from fall injury  Outcome: Progressing     Problem: Nutrition Deficit:  Goal: Optimize nutritional status  12/19/2024 2131 by Zahraa Wise RN  Outcome: Progressing  12/19/2024 1117 by Anne Colvin RD, CNSC, LD  Flowsheets (Taken 12/19/2024 1117)  Nutrient intake appropriate for improving, restoring, or maintaining nutritional needs:   Monitor oral intake, labs, and treatment plans   Assess nutritional status and recommend course of action   Recommend appropriate diets, oral nutritional supplements, and vitamin/mineral supplements

## 2024-12-20 NOTE — CARE COORDINATION
Social Work / Discharge Planning: Await if [facility] can accept for  rehab. Will need udpated therapy. If insurance declines, Lena stated they would consider private pay for a week. Ghassan aware patient CANNOT have infusion treatment for cancer if at SNF. . Patient spoke to Pulmonology this am and is agreeable for Pleur-X placement. Await IR availability. Patient will need pre-cert for SNF. Patient is active with [facility] nd if patient does go home at d/c, will need [facility] and order Pleur-X cath supplies. SW to follow. Electronically signed by NIDIRA Weems on 12/20/24 at 10:30 AM EST     Addendum: BF Accepted. Sigrid from [facility] asking for updated therapy. Completed. Still awaiting Pleur-x cath placement. [facility] can accept Pleur-x. Just need to verify bed on Monday. SW completed PASSAR due to HENS system not working and initiated N 17  as well as transport forms. Will need pre-cert . SW to follow. Electronically signed by INDIRA Weems on 12/20/24 at 3:16 PM EST

## 2024-12-20 NOTE — OR NURSING
Patient vitals taken, Dr Fatima in to speak to patient regarding  risks, consent signed. Questions answered.   Patient in fluoro, positioned on table left side down  with O2 and monitoring equipment attached. Patient scanned and images reviewed by . Patient prepped and draped per protocol. Lidocaine used for numbing and medication given for sedation . 15.5 Mongolian pleurx catheter  placed right lateral chest wall with fluoscopy  guidance by Dr. Fatima @ 1635 . Connected to bottle suction for  clear blood tinged rust colored fluid and sutured internally with 3-0 vicryl and 2-0 silk outside.   Patient re-scanned. Puncture site cleansed, and dressing applied. Patient tolerated well. Procedure completed @ 1647, patient transported back to floor, and nurse to nurse given to Mela STEELE.

## 2024-12-20 NOTE — DISCHARGE INSTRUCTIONS
Indwelling Catheter Drainage for Chest or Abdomen: Care Instructions  Overview     An indwelling chest or belly (abdominal) catheter is a soft, flexible tube that runs under your skin to an area next to your lungs or in your belly. One end of the tube stays outside your body and drains into a bottle or other container.  When fluid builds up around your lung (pleural effusion), it can cause discomfort and make it hard for you to breathe. Other symptoms can include fever or cough.  When fluid collects in the space between the organs in your belly and the belly wall (ascites), you may feel bloated or overly full. Other symptoms of ascites (say \"uh-SY-teez\") include belly pain and weight gain.  Draining the fluid through a tube helps relieve these symptoms.  \"Indwelling\" means the tube stays in place for as long as you need it. You don't have to get a new catheter put in every time fluid builds up. You can empty the fluid from the bottle at home--or have someone else do it--whenever you need to.  The doctor may use stitches to hold the catheter in place where it exits your body. The site may be sore for a day or two.  Follow-up care is a key part of your treatment and safety. Be sure to make and go to all appointments, and call your doctor if you are having problems. It's also a good idea to know your test results and keep a list of the medicines you take.  How can you care for yourself at home?  Follow the instructions for taking care of your catheter. Your doctor or nurse will teach you or your caregivers what to do.  How to drain fluid  Wash your hands well. Clean your work area with a disinfecting wipe or alcohol. Place everything you will need on your work area, including the drain tube and the bag or bottle that will collect the fluid.  Wear disposable gloves if they are part of your kit or if your doctor told you to.  Make sure the drainage tube is closed. It may have a clip on it.  Remove the cap at the end

## 2024-12-20 NOTE — PLAN OF CARE
Problem: ABCDS Injury Assessment  Goal: Absence of physical injury  12/20/2024 1725 by Margaret Benito RN  Outcome: Progressing  12/20/2024 1606 by Margaret Benito RN  Outcome: Progressing     Problem: Skin/Tissue Integrity  Goal: Absence of new skin breakdown  Description: 1.  Monitor for areas of redness and/or skin breakdown  2.  Assess vascular access sites hourly  3.  Every 4-6 hours minimum:  Change oxygen saturation probe site  4.  Every 4-6 hours:  If on nasal continuous positive airway pressure, respiratory therapy assess nares and determine need for appliance change or resting period.  12/20/2024 1725 by Margaret Benito RN  Outcome: Progressing  12/20/2024 1606 by Margaret Benito RN  Outcome: Progressing     Problem: Discharge Planning  Goal: Discharge to home or other facility with appropriate resources  12/20/2024 1725 by Margaret Benito RN  Outcome: Progressing  12/20/2024 1606 by Margaret Benito RN  Outcome: Progressing     Problem: Pain  Goal: Verbalizes/displays adequate comfort level or baseline comfort level  12/20/2024 1725 by Margaret Benito RN  Outcome: Progressing  12/20/2024 1606 by Margaret Benito RN  Outcome: Progressing     Problem: Safety - Adult  Goal: Free from fall injury  12/20/2024 1725 by Margaret Benito RN  Outcome: Progressing  12/20/2024 1606 by Margaret Benito RN  Outcome: Progressing     Problem: Nutrition Deficit:  Goal: Optimize nutritional status  12/20/2024 1725 by Margaret Benito RN  Outcome: Progressing  12/20/2024 1606 by Margaret Benito RN  Outcome: Progressing

## 2024-12-20 NOTE — PRE SEDATION
Sedation Pre-Procedure Note    Patient Name: Terell Martinez   YOB: 1945  Room/Bed: [facility]0731-A  Medical Record Number: [id number]  Date: 12/20/2024   Time: 4:01 PM       Indication:  Lung CA, Recurrent right pleural effusion    Consent: I have discussed with the patient and/or the patient representative the indication, alternatives, and the possible risks and/or complications of the planned procedure and the anesthesia methods. The patient and/or patient representative appear to understand and agree to proceed.    Vital Signs:   Vitals:    12/20/24 0742   BP: 129/75   Pulse: 95   Resp: 17   Temp: 97.4 °F (36.3 °C)   SpO2: 95%       Past Medical History:   has a past medical history of Adenocarcinoma, lung, right (HCC), Cancer (HCC), COPD (chronic obstructive pulmonary disease) (HCC), Hyperlipidemia, Secondary cancer of bone (HCC), Secondary cancer of brain (HCC), and Status post stereotactic radiosurgery.    Past Surgical History:   has a past surgical history that includes CT NEEDLE BIOPSY LUNG PERCUTANEOUS (04/05/2024); Cataract extraction (Bilateral, 02/2024); back surgery (2005); Total knee arthroplasty (Left); Colonoscopy; eye surgery; Tonsillectomy; hernia repair; and joint replacement.    Medications:   Scheduled Meds:    cholestyramine  1 packet Oral BID    baclofen  5 mg Oral TID    sodium chloride  1 spray Each Nostril TID    droNABinol  2.5 mg Oral BID    gabapentin  300 mg Oral TID    vitamin D  2,000 Units Oral QAM    sodium chloride flush  5-40 mL IntraVENous 2 times per day     Continuous Infusions:    [Held by provider] heparin (PORCINE) Infusion Stopped (12/20/24 0900)    sodium chloride       PRN Meds: fluticasone, ipratropium 0.5 mg-albuterol 2.5 mg, heparin (porcine), heparin (porcine), sodium chloride, HYDROmorphone, oxyCODONE, sodium chloride flush, sodium chloride, potassium chloride **OR** potassium alternative oral replacement **OR** potassium chloride, magnesium sulfate, ondansetron

## 2024-12-21 LAB
ERYTHROCYTE [DISTWIDTH] IN BLOOD BY AUTOMATED COUNT: 17.1 % (ref 11.5–15)
HCT VFR BLD AUTO: 32.4 % (ref 37–54)
HGB BLD-MCNC: 10.5 G/DL (ref 12.5–16.5)
MCH RBC QN AUTO: 28.9 PG (ref 26–35)
MCHC RBC AUTO-ENTMCNC: 32.4 G/DL (ref 32–34.5)
MCV RBC AUTO: 89.3 FL (ref 80–99.9)
PARTIAL THROMBOPLASTIN TIME: 144.5 SEC (ref 24.5–35.1)
PARTIAL THROMBOPLASTIN TIME: 59.3 SEC (ref 24.5–35.1)
PARTIAL THROMBOPLASTIN TIME: 59.5 SEC (ref 24.5–35.1)
PARTIAL THROMBOPLASTIN TIME: 87.7 SEC (ref 24.5–35.1)
PLATELET # BLD AUTO: 173 K/UL (ref 130–450)
PMV BLD AUTO: 10 FL (ref 7–12)
RBC # BLD AUTO: 3.63 M/UL (ref 3.8–5.8)
WBC OTHER # BLD: 28.8 K/UL (ref 4.5–11.5)

## 2024-12-21 PROCEDURE — 1200000000 HC SEMI PRIVATE

## 2024-12-21 PROCEDURE — 2700000000 HC OXYGEN THERAPY PER DAY

## 2024-12-21 PROCEDURE — 2500000003 HC RX 250 WO HCPCS: Performed by: HOSPITALIST

## 2024-12-21 PROCEDURE — 85027 COMPLETE CBC AUTOMATED: CPT

## 2024-12-21 PROCEDURE — 6360000002 HC RX W HCPCS: Performed by: INTERNAL MEDICINE

## 2024-12-21 PROCEDURE — 2060000000 HC ICU INTERMEDIATE R&B

## 2024-12-21 PROCEDURE — 6370000000 HC RX 637 (ALT 250 FOR IP): Performed by: NURSE PRACTITIONER

## 2024-12-21 PROCEDURE — 85730 THROMBOPLASTIN TIME PARTIAL: CPT

## 2024-12-21 PROCEDURE — 6370000000 HC RX 637 (ALT 250 FOR IP)

## 2024-12-21 PROCEDURE — 51798 US URINE CAPACITY MEASURE: CPT

## 2024-12-21 PROCEDURE — 6370000000 HC RX 637 (ALT 250 FOR IP): Performed by: HOSPITALIST

## 2024-12-21 RX ADMIN — OXYCODONE 10 MG: 5 TABLET ORAL at 11:13

## 2024-12-21 RX ADMIN — Medication 2000 UNITS: at 09:06

## 2024-12-21 RX ADMIN — BACLOFEN 5 MG: 10 TABLET ORAL at 09:06

## 2024-12-21 RX ADMIN — SALINE NASAL SPRAY 1 SPRAY: 1.5 SOLUTION NASAL at 09:07

## 2024-12-21 RX ADMIN — DRONABINOL 2.5 MG: 2.5 CAPSULE ORAL at 21:52

## 2024-12-21 RX ADMIN — HEPARIN SODIUM 13 UNITS/KG/HR: 10000 INJECTION, SOLUTION INTRAVENOUS at 21:29

## 2024-12-21 RX ADMIN — BACLOFEN 5 MG: 10 TABLET ORAL at 21:51

## 2024-12-21 RX ADMIN — SALINE NASAL SPRAY 1 SPRAY: 1.5 SOLUTION NASAL at 22:05

## 2024-12-21 RX ADMIN — SALINE NASAL SPRAY 1 SPRAY: 1.5 SOLUTION NASAL at 13:50

## 2024-12-21 RX ADMIN — CHOLESTYRAMINE 4 G: 4 POWDER, FOR SUSPENSION ORAL at 09:05

## 2024-12-21 RX ADMIN — GABAPENTIN 300 MG: 300 CAPSULE ORAL at 21:52

## 2024-12-21 RX ADMIN — SODIUM CHLORIDE, PRESERVATIVE FREE 10 ML: 5 INJECTION INTRAVENOUS at 09:07

## 2024-12-21 RX ADMIN — HEPARIN SODIUM 15 UNITS/KG/HR: 10000 INJECTION, SOLUTION INTRAVENOUS at 03:35

## 2024-12-21 RX ADMIN — HEPARIN SODIUM 13 UNITS/KG/HR: 10000 INJECTION, SOLUTION INTRAVENOUS at 06:28

## 2024-12-21 RX ADMIN — GABAPENTIN 300 MG: 300 CAPSULE ORAL at 09:06

## 2024-12-21 RX ADMIN — SALINE NASAL SPRAY 1 SPRAY: 1.5 SOLUTION NASAL at 18:19

## 2024-12-21 RX ADMIN — DRONABINOL 2.5 MG: 2.5 CAPSULE ORAL at 09:06

## 2024-12-21 RX ADMIN — CHOLESTYRAMINE 4 G: 4 POWDER, FOR SUSPENSION ORAL at 21:52

## 2024-12-21 ASSESSMENT — PAIN SCALES - GENERAL
PAINLEVEL_OUTOF10: 5
PAINLEVEL_OUTOF10: 7
PAINLEVEL_OUTOF10: 7

## 2024-12-21 ASSESSMENT — PAIN - FUNCTIONAL ASSESSMENT: PAIN_FUNCTIONAL_ASSESSMENT: ACTIVITIES ARE NOT PREVENTED

## 2024-12-21 ASSESSMENT — PAIN DESCRIPTION - PAIN TYPE: TYPE: CHRONIC PAIN

## 2024-12-21 ASSESSMENT — PAIN DESCRIPTION - LOCATION
LOCATION: BACK
LOCATION: BACK

## 2024-12-21 ASSESSMENT — PAIN DESCRIPTION - DESCRIPTORS
DESCRIPTORS: ACHING
DESCRIPTORS: ACHING;DISCOMFORT;SORE

## 2024-12-21 ASSESSMENT — PAIN DESCRIPTION - ORIENTATION
ORIENTATION: RIGHT
ORIENTATION: RIGHT

## 2024-12-21 ASSESSMENT — PAIN DESCRIPTION - ONSET: ONSET: ON-GOING

## 2024-12-21 ASSESSMENT — PAIN DESCRIPTION - FREQUENCY: FREQUENCY: CONTINUOUS

## 2024-12-21 NOTE — PLAN OF CARE
Problem: ABCDS Injury Assessment  Goal: Absence of physical injury  12/21/2024 1131 by Jodee Reis, RN  Outcome: Progressing     Problem: Skin/Tissue Integrity  Goal: Absence of new skin breakdown  Description: 1.  Monitor for areas of redness and/or skin breakdown  2.  Assess vascular access sites hourly  3.  Every 4-6 hours minimum:  Change oxygen saturation probe site  4.  Every 4-6 hours:  If on nasal continuous positive airway pressure, respiratory therapy assess nares and determine need for appliance change or resting period.  12/21/2024 1131 by Jodee Reis, RN  Outcome: Progressing     Problem: Discharge Planning  Goal: Discharge to home or other facility with appropriate resources  12/21/2024 1131 by Jodee Reis, RN  Outcome: Progressing     Problem: Pain  Goal: Verbalizes/displays adequate comfort level or baseline comfort level  12/21/2024 1131 by Jodee Reis, RN  Outcome: Progressing     Problem: Safety - Adult  Goal: Free from fall injury  12/21/2024 1131 by Jodee Reis, RN  Outcome: Progressing     Problem: Nutrition Deficit:  Goal: Optimize nutritional status  12/21/2024 1131 by Jodee Reis, RN  Outcome: Progressing

## 2024-12-21 NOTE — PLAN OF CARE
Problem: ABCDS Injury Assessment  Goal: Absence of physical injury  12/21/2024 0023 by June Springer RN  Outcome: Progressing  12/20/2024 1725 by Margaret Benito RN  Outcome: Progressing  12/20/2024 1606 by Margaret Benito RN  Outcome: Progressing     Problem: Skin/Tissue Integrity  Goal: Absence of new skin breakdown  Description: 1.  Monitor for areas of redness and/or skin breakdown  2.  Assess vascular access sites hourly  3.  Every 4-6 hours minimum:  Change oxygen saturation probe site  4.  Every 4-6 hours:  If on nasal continuous positive airway pressure, respiratory therapy assess nares and determine need for appliance change or resting period.  12/21/2024 0023 by June Springer RN  Outcome: Progressing  12/20/2024 1725 by Margaret Benito RN  Outcome: Progressing  12/20/2024 1606 by Margaret Benito RN  Outcome: Progressing     Problem: Discharge Planning  Goal: Discharge to home or other facility with appropriate resources  12/21/2024 0023 by June Springer RN  Outcome: Progressing  12/20/2024 1725 by Margaret Benito RN  Outcome: Progressing  12/20/2024 1606 by Margaret Benito RN  Outcome: Progressing     Problem: Pain  Goal: Verbalizes/displays adequate comfort level or baseline comfort level  12/21/2024 0023 by June Springer RN  Outcome: Progressing  12/20/2024 1725 by Margaret Benito RN  Outcome: Progressing  12/20/2024 1606 by Margaret Benito RN  Outcome: Progressing     Problem: Safety - Adult  Goal: Free from fall injury  12/21/2024 0023 by June Springer RN  Outcome: Progressing  12/20/2024 1725 by Margaret Benito RN  Outcome: Progressing  12/20/2024 1606 by Margaret Benito RN  Outcome: Progressing     Problem: Nutrition Deficit:  Goal: Optimize nutritional status  12/21/2024 0023 by June Springer RN  Outcome: Progressing  12/20/2024 1725 by Margaret Benito RN  Outcome: Progressing  12/20/2024 1606 by Linnus, Margaret, RN  Outcome: Progressing

## 2024-12-22 ENCOUNTER — APPOINTMENT (OUTPATIENT)
Dept: ULTRASOUND IMAGING | Age: 79
DRG: 542 | End: 2024-12-22
Payer: MEDICARE

## 2024-12-22 LAB
ANION GAP SERPL CALCULATED.3IONS-SCNC: 11 MMOL/L (ref 7–16)
BUN SERPL-MCNC: 10 MG/DL (ref 6–23)
CALCIUM SERPL-MCNC: 8.6 MG/DL (ref 8.6–10.2)
CHLORIDE SERPL-SCNC: 102 MMOL/L (ref 98–107)
CO2 SERPL-SCNC: 22 MMOL/L (ref 22–29)
CREAT SERPL-MCNC: 1 MG/DL (ref 0.7–1.2)
ERYTHROCYTE [DISTWIDTH] IN BLOOD BY AUTOMATED COUNT: 17.5 % (ref 11.5–15)
GFR, ESTIMATED: 79 ML/MIN/1.73M2
GLUCOSE SERPL-MCNC: 134 MG/DL (ref 74–99)
HCT VFR BLD AUTO: 35 % (ref 37–54)
HGB BLD-MCNC: 10.9 G/DL (ref 12.5–16.5)
MCH RBC QN AUTO: 28.5 PG (ref 26–35)
MCHC RBC AUTO-ENTMCNC: 31.1 G/DL (ref 32–34.5)
MCV RBC AUTO: 91.6 FL (ref 80–99.9)
PARTIAL THROMBOPLASTIN TIME: 29 SEC (ref 24.5–35.1)
PLATELET # BLD AUTO: 173 K/UL (ref 130–450)
PMV BLD AUTO: 10.3 FL (ref 7–12)
POTASSIUM SERPL-SCNC: 3.5 MMOL/L (ref 3.5–5)
RBC # BLD AUTO: 3.82 M/UL (ref 3.8–5.8)
SODIUM SERPL-SCNC: 135 MMOL/L (ref 132–146)
WBC OTHER # BLD: 22.3 K/UL (ref 4.5–11.5)

## 2024-12-22 PROCEDURE — 6370000000 HC RX 637 (ALT 250 FOR IP): Performed by: NURSE PRACTITIONER

## 2024-12-22 PROCEDURE — 1200000000 HC SEMI PRIVATE

## 2024-12-22 PROCEDURE — 93970 EXTREMITY STUDY: CPT

## 2024-12-22 PROCEDURE — 2060000000 HC ICU INTERMEDIATE R&B

## 2024-12-22 PROCEDURE — 85730 THROMBOPLASTIN TIME PARTIAL: CPT

## 2024-12-22 PROCEDURE — 97530 THERAPEUTIC ACTIVITIES: CPT

## 2024-12-22 PROCEDURE — 6370000000 HC RX 637 (ALT 250 FOR IP)

## 2024-12-22 PROCEDURE — 2700000000 HC OXYGEN THERAPY PER DAY

## 2024-12-22 PROCEDURE — 80048 BASIC METABOLIC PNL TOTAL CA: CPT

## 2024-12-22 PROCEDURE — 2500000003 HC RX 250 WO HCPCS: Performed by: HOSPITALIST

## 2024-12-22 PROCEDURE — 6370000000 HC RX 637 (ALT 250 FOR IP): Performed by: HOSPITALIST

## 2024-12-22 PROCEDURE — 85027 COMPLETE CBC AUTOMATED: CPT

## 2024-12-22 RX ADMIN — DRONABINOL 2.5 MG: 2.5 CAPSULE ORAL at 08:57

## 2024-12-22 RX ADMIN — GABAPENTIN 300 MG: 300 CAPSULE ORAL at 20:36

## 2024-12-22 RX ADMIN — GABAPENTIN 300 MG: 300 CAPSULE ORAL at 08:57

## 2024-12-22 RX ADMIN — BACLOFEN 5 MG: 10 TABLET ORAL at 13:21

## 2024-12-22 RX ADMIN — GABAPENTIN 300 MG: 300 CAPSULE ORAL at 13:30

## 2024-12-22 RX ADMIN — SODIUM CHLORIDE, PRESERVATIVE FREE 10 ML: 5 INJECTION INTRAVENOUS at 08:57

## 2024-12-22 RX ADMIN — DRONABINOL 2.5 MG: 2.5 CAPSULE ORAL at 20:36

## 2024-12-22 RX ADMIN — SALINE NASAL SPRAY 1 SPRAY: 1.5 SOLUTION NASAL at 20:37

## 2024-12-22 RX ADMIN — SALINE NASAL SPRAY 1 SPRAY: 1.5 SOLUTION NASAL at 17:43

## 2024-12-22 RX ADMIN — SALINE NASAL SPRAY 1 SPRAY: 1.5 SOLUTION NASAL at 07:47

## 2024-12-22 RX ADMIN — CHOLESTYRAMINE 4 G: 4 POWDER, FOR SUSPENSION ORAL at 20:35

## 2024-12-22 RX ADMIN — BACLOFEN 5 MG: 10 TABLET ORAL at 08:56

## 2024-12-22 RX ADMIN — SALINE NASAL SPRAY 1 SPRAY: 1.5 SOLUTION NASAL at 13:21

## 2024-12-22 RX ADMIN — BACLOFEN 5 MG: 10 TABLET ORAL at 20:35

## 2024-12-22 RX ADMIN — SODIUM CHLORIDE, PRESERVATIVE FREE 10 ML: 5 INJECTION INTRAVENOUS at 20:35

## 2024-12-22 RX ADMIN — Medication 2000 UNITS: at 08:57

## 2024-12-22 RX ADMIN — CHOLESTYRAMINE 4 G: 4 POWDER, FOR SUSPENSION ORAL at 08:56

## 2024-12-22 ASSESSMENT — PAIN DESCRIPTION - ONSET: ONSET: ON-GOING

## 2024-12-22 ASSESSMENT — PAIN DESCRIPTION - PAIN TYPE: TYPE: ACUTE PAIN

## 2024-12-22 ASSESSMENT — PAIN SCALES - GENERAL
PAINLEVEL_OUTOF10: 8
PAINLEVEL_OUTOF10: 8
PAINLEVEL_OUTOF10: 7

## 2024-12-22 ASSESSMENT — PAIN - FUNCTIONAL ASSESSMENT
PAIN_FUNCTIONAL_ASSESSMENT: ACTIVITIES ARE NOT PREVENTED
PAIN_FUNCTIONAL_ASSESSMENT: ACTIVITIES ARE NOT PREVENTED

## 2024-12-22 ASSESSMENT — PAIN DESCRIPTION - DESCRIPTORS
DESCRIPTORS: ACHING;DISCOMFORT
DESCRIPTORS: ACHING;DISCOMFORT

## 2024-12-22 ASSESSMENT — PAIN DESCRIPTION - ORIENTATION
ORIENTATION: MID;LOWER
ORIENTATION: LOWER

## 2024-12-22 ASSESSMENT — PAIN DESCRIPTION - LOCATION
LOCATION: BACK
LOCATION: BACK

## 2024-12-22 ASSESSMENT — PAIN DESCRIPTION - FREQUENCY: FREQUENCY: INTERMITTENT

## 2024-12-22 NOTE — CONSULTS
[facility]  [facility]  [address], [address]  [place] [address]    Pulmonary Consultation      Reason for Consult:  back pain    Requesting Physician:  Awadalla, Maged I, MD    CHIEF COMPLAINT:  back pain    History Obtained From:  patient    HISTORY OF PRESENT ILLNESS:                The patient is a 79 y.o. male with significant past medical history of metastatic adenoca who presents with increased back pain for the past few months, unclear if any worse with breathing or related to back pain, got worse with pain the past few weeks. May have noticed slightly increased cough/minimal sputum production, mentions some occ bleeding from nose. Currently on 2 li NC reclining in bed, some blood seen in tissue beside him from nose, claims stable with respiratory function though looking slightly tachypneic (?) both from back pain and breathing, no cough during exam.    Past Medical History:        Diagnosis Date    Adenocarcinoma, lung, right (HCC) 04/05/2024    Cancer (HCC)     COPD (chronic obstructive pulmonary disease) (HCC)     Hyperlipidemia     Secondary cancer of bone (HCC) 04/19/2024    T9 lytic lesion on PET/CT    Secondary cancer of brain (HCC) 08/28/2024    Status post stereotactic radiosurgery 09/10/2024    S/p SRS to left anterior frontal metastasis.       Past Surgical History:        Procedure Laterality Date    BACK SURGERY  2005    CATARACT EXTRACTION Bilateral 02/2024 2/22/2024  left eye and 2/29/2024 right eye.    COLONOSCOPY      CT NEEDLE BIOPSY LUNG PERCUTANEOUS W IMAGING GUIDANCE  04/05/2024    CT NEEDLE BIOPSY LUNG PERCUTANEOUS 4/5/2024 [facility] CT    EYE SURGERY      HERNIA REPAIR      JOINT REPLACEMENT      TONSILLECTOMY      TOTAL KNEE ARTHROPLASTY Left        Current Medications:    Current Facility-Administered Medications: cholestyramine (QUESTRAN) packet 4 g, 1 packet, Oral, BID  baclofen (LIORESAL) tablet 5 mg, 5 mg, Oral, 
  Palliative Care Department  [phone]  Palliative Care Initial Consult  Provider Jocelyne Herrera, APRN - CNP      PATIENT: Terell Martinez  : 1945  MRN: [id number]  ADMISSION DATE: 2024 10:31 AM  Referring Provider: Keenan Stauffer MD     Palliative Medicine was consulted on hospital day 1 for assistance with Goals of care    HPI:     Clinical Summary:Terell Martinez is a 79 y.o. y/o male with a history of metastatic lung cancer with bone and brain mets who presented to [facility] on 2024 with intractable pain.  CT of the spine showed lytic and sclerotic metastasis at T9 and new sclerotic metastasis at L1, multilevel degenerative changes.  CT of the chest showed a PE in the right upper lobe and a large right pleural effusion.  He was admitted to telemetry for further medical management.    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  Capacity is time limited and situation/question specific  Outcome of goals of care meeting:  Goal is to continue chemo to see how he responds  Code status changed to a limited: DNRCCA/ DNI  Code status Limited DNRCCA/ DNI  Advanced Directives: DNR CCA form in [facility]  Surrogate/Legal NOK:  EhLena (Cousin) - Other Relative - [phone]     Spiritual assessment: no spiritual distress identified  Bereavement and grief: to be determined  Referrals to: 
Chart reviewed    79M widely metastatic lung Ca w/ R side pleurex in place for malignant effusion. Has R side PE. Being anticoagulated, however with epistaxis thus vascular being c/s for IVC filter and AC was stopped. Does not appear to have any sort of real drift in hgb. Also no DVT duplex. Have ordered DVT duplex. I am unclear if he is actually failing anticoagulation, does not seem to be the case given hgb seems stable. Will follow up on studies. Thanks for consult.     Adebayo Sargent MD    
OTOLARYNGOLOGY  CONSULT NOTE  12/22/2024    Physician Consulted: Dr. Guerrero  Reason for Consult: Epistaxis  Referring Physician: Dr. Molina    HPI  Terell Martinez is a 79 y.o. male with a complex medical history as noted below, who has experienced multiple episodes of epistaxis over the past few days. He was receiving IV heparin and remains on supplemental O2 via NC. O2 is humidified, and he has been using nasal saline intermittently throughout the day. No further bleeding at present, but he does report that his nose is very dry and crusted.       Past Medical History:   Diagnosis Date    Adenocarcinoma, lung, right (HCC) 04/05/2024    Cancer (HCC)     COPD (chronic obstructive pulmonary disease) (HCC)     Hyperlipidemia     Secondary cancer of bone (HCC) 04/19/2024    T9 lytic lesion on PET/CT    Secondary cancer of brain (HCC) 08/28/2024    Status post stereotactic radiosurgery 09/10/2024    S/p SRS to left anterior frontal metastasis.       Past Surgical History:   Procedure Laterality Date    BACK SURGERY  2005    CATARACT EXTRACTION Bilateral 02/2024 2/22/2024  left eye and 2/29/2024 right eye.    COLONOSCOPY      CT NEEDLE BIOPSY LUNG PERCUTANEOUS W IMAGING GUIDANCE  04/05/2024    CT NEEDLE BIOPSY LUNG PERCUTANEOUS 4/5/2024 [facility] CT    EYE SURGERY      HERNIA REPAIR      JOINT REPLACEMENT      TONSILLECTOMY      TOTAL KNEE ARTHROPLASTY Left     US INSERT TUNNELED INTRAPERITONEAL CATH W OR WO CONTRAST PERC S&I  12/20/2024    US INSERT TUNNELED INTRAPERITONEAL CATH W OR WO CONTRAST PERC S&I 12/20/2024 [facility] ULTRASOUND       Medications Prior to Admission:    Prior to Admission medications    Medication Sig Start Date End Date Taking? Authorizing Provider   oxyCODONE (ROXICODONE) 5 MG immediate release tablet Take 1 tablet by mouth every 6 hours as needed for Pain. 12/11/24  Yes Kahlil Gabriel MD   rosuvastatin (CRESTOR) 10 MG tablet Take 0.5 tablets by mouth nightly   Yes ProviderKahlil MD   folic 
Requirements Based On: Formula ([place] [employer] Jeor)  Weight Used for Energy Requirements: Current  Energy (kcal/day):   Weight Used for Protein Requirements: Ideal  Protein (g/day): 80-90 (1.3-1.5 g/kg)  Method Used for Fluid Requirements: 1 ml/kcal  Fluid (ml/day):     Nutrition Diagnosis:   Inadequate oral intake related to catabolic illness as evidenced by poor intake prior to admission, variable po intake    Nutrition Interventions:   Food and/or Nutrient Delivery: Continue Current Diet, Start Oral Nutrition Supplement  Nutrition Education/Counseling: No recommendation at this time  Coordination of Nutrition Care: Continue to monitor while inpatient       Goals:  Goals: PO intake 75% or greater, by next RD assessment  Type of Goal: New goal  Previous Goal Met: New Goal    Nutrition Monitoring and Evaluation:   Behavioral-Environmental Outcomes: None Identified  Food/Nutrient Intake Outcomes: Food and Nutrient Intake, Supplement Intake  Physical Signs/Symptoms Outcomes: Biochemical Data, GI Status, Fluid Status or Edema, Nutrition Focused Physical Findings, Skin, Weight    Discharge Planning:    Continue Oral Nutrition Supplement     Anne Colvin RD, CNSC, LD  Contact: x [phone]

## 2024-12-22 NOTE — PLAN OF CARE
Problem: ABCDS Injury Assessment  Goal: Absence of physical injury  12/22/2024 1358 by Jodee Reis, RN  Outcome: Progressing     Problem: Skin/Tissue Integrity  Goal: Absence of new skin breakdown  Description: 1.  Monitor for areas of redness and/or skin breakdown  2.  Assess vascular access sites hourly  3.  Every 4-6 hours minimum:  Change oxygen saturation probe site  4.  Every 4-6 hours:  If on nasal continuous positive airway pressure, respiratory therapy assess nares and determine need for appliance change or resting period.  12/22/2024 1358 by Jodee Reis, RN  Outcome: Progressing     Problem: Discharge Planning  Goal: Discharge to home or other facility with appropriate resources  12/22/2024 1358 by Jodee Reis, RN  Outcome: Progressing     Problem: Pain  Goal: Verbalizes/displays adequate comfort level or baseline comfort level  12/22/2024 1358 by Jodee Reis, RN  Outcome: Progressing     Problem: Safety - Adult  Goal: Free from fall injury  12/22/2024 1358 by Jodee Reis, RN  Outcome: Progressing     Problem: Nutrition Deficit:  Goal: Optimize nutritional status  12/22/2024 1358 by Jodee Reis, RN  Outcome: Progressing

## 2024-12-23 LAB
ERYTHROCYTE [DISTWIDTH] IN BLOOD BY AUTOMATED COUNT: 17.2 % (ref 11.5–15)
HCT VFR BLD AUTO: 33.3 % (ref 37–54)
HGB BLD-MCNC: 10.5 G/DL (ref 12.5–16.5)
MCH RBC QN AUTO: 28.9 PG (ref 26–35)
MCHC RBC AUTO-ENTMCNC: 31.5 G/DL (ref 32–34.5)
MCV RBC AUTO: 91.7 FL (ref 80–99.9)
PARTIAL THROMBOPLASTIN TIME: 27.8 SEC (ref 24.5–35.1)
PARTIAL THROMBOPLASTIN TIME: 47.4 SEC (ref 24.5–35.1)
PLATELET # BLD AUTO: 144 K/UL (ref 130–450)
PMV BLD AUTO: 10.3 FL (ref 7–12)
RBC # BLD AUTO: 3.63 M/UL (ref 3.8–5.8)
WBC OTHER # BLD: 22.9 K/UL (ref 4.5–11.5)

## 2024-12-23 PROCEDURE — 36415 COLL VENOUS BLD VENIPUNCTURE: CPT

## 2024-12-23 PROCEDURE — 6370000000 HC RX 637 (ALT 250 FOR IP): Performed by: HOSPITALIST

## 2024-12-23 PROCEDURE — 6370000000 HC RX 637 (ALT 250 FOR IP): Performed by: NURSE PRACTITIONER

## 2024-12-23 PROCEDURE — 1200000000 HC SEMI PRIVATE

## 2024-12-23 PROCEDURE — 2500000003 HC RX 250 WO HCPCS: Performed by: HOSPITALIST

## 2024-12-23 PROCEDURE — 85027 COMPLETE CBC AUTOMATED: CPT

## 2024-12-23 PROCEDURE — 97530 THERAPEUTIC ACTIVITIES: CPT

## 2024-12-23 PROCEDURE — 85730 THROMBOPLASTIN TIME PARTIAL: CPT

## 2024-12-23 PROCEDURE — 6370000000 HC RX 637 (ALT 250 FOR IP)

## 2024-12-23 PROCEDURE — 6360000002 HC RX W HCPCS: Performed by: INTERNAL MEDICINE

## 2024-12-23 PROCEDURE — 6370000000 HC RX 637 (ALT 250 FOR IP): Performed by: INTERNAL MEDICINE

## 2024-12-23 PROCEDURE — 2700000000 HC OXYGEN THERAPY PER DAY

## 2024-12-23 RX ORDER — DRONABINOL 2.5 MG/1
5 CAPSULE ORAL 2 TIMES DAILY WITH MEALS
Status: DISCONTINUED | OUTPATIENT
Start: 2024-12-23 | End: 2024-12-26 | Stop reason: HOSPADM

## 2024-12-23 RX ADMIN — BACLOFEN 5 MG: 10 TABLET ORAL at 08:48

## 2024-12-23 RX ADMIN — SODIUM CHLORIDE, PRESERVATIVE FREE 10 ML: 5 INJECTION INTRAVENOUS at 19:49

## 2024-12-23 RX ADMIN — CHOLESTYRAMINE 4 G: 4 POWDER, FOR SUSPENSION ORAL at 08:48

## 2024-12-23 RX ADMIN — POLYETHYLENE GLYCOL 3350 17 G: 17 POWDER, FOR SOLUTION ORAL at 14:34

## 2024-12-23 RX ADMIN — HEPARIN SODIUM 3200 UNITS: 1000 INJECTION INTRAVENOUS; SUBCUTANEOUS at 18:28

## 2024-12-23 RX ADMIN — HEPARIN SODIUM 15 UNITS/KG/HR: 10000 INJECTION, SOLUTION INTRAVENOUS at 18:27

## 2024-12-23 RX ADMIN — Medication 2000 UNITS: at 08:48

## 2024-12-23 RX ADMIN — GABAPENTIN 300 MG: 300 CAPSULE ORAL at 19:49

## 2024-12-23 RX ADMIN — HEPARIN SODIUM 13 UNITS/KG/HR: 10000 INJECTION, SOLUTION INTRAVENOUS at 11:27

## 2024-12-23 RX ADMIN — DRONABINOL 5 MG: 2.5 CAPSULE ORAL at 16:25

## 2024-12-23 RX ADMIN — BACLOFEN 5 MG: 10 TABLET ORAL at 14:34

## 2024-12-23 RX ADMIN — SALINE NASAL SPRAY 1 SPRAY: 1.5 SOLUTION NASAL at 08:49

## 2024-12-23 RX ADMIN — SALINE NASAL SPRAY 1 SPRAY: 1.5 SOLUTION NASAL at 16:25

## 2024-12-23 RX ADMIN — DRONABINOL 2.5 MG: 2.5 CAPSULE ORAL at 08:48

## 2024-12-23 RX ADMIN — BACLOFEN 5 MG: 10 TABLET ORAL at 19:49

## 2024-12-23 RX ADMIN — SODIUM CHLORIDE, PRESERVATIVE FREE 10 ML: 5 INJECTION INTRAVENOUS at 08:48

## 2024-12-23 RX ADMIN — GABAPENTIN 300 MG: 300 CAPSULE ORAL at 14:34

## 2024-12-23 RX ADMIN — SALINE NASAL SPRAY 1 SPRAY: 1.5 SOLUTION NASAL at 15:00

## 2024-12-23 RX ADMIN — GABAPENTIN 300 MG: 300 CAPSULE ORAL at 08:48

## 2024-12-23 ASSESSMENT — PAIN DESCRIPTION - ORIENTATION
ORIENTATION: RIGHT;LEFT
ORIENTATION: MID

## 2024-12-23 ASSESSMENT — PAIN DESCRIPTION - DESCRIPTORS
DESCRIPTORS: ACHING
DESCRIPTORS: ACHING;DISCOMFORT

## 2024-12-23 ASSESSMENT — PAIN DESCRIPTION - LOCATION
LOCATION: BACK
LOCATION: BACK

## 2024-12-23 ASSESSMENT — PAIN SCALES - GENERAL
PAINLEVEL_OUTOF10: 1
PAINLEVEL_OUTOF10: 2
PAINLEVEL_OUTOF10: 5
PAINLEVEL_OUTOF10: 2

## 2024-12-23 NOTE — PLAN OF CARE
Problem: ABCDS Injury Assessment  Goal: Absence of physical injury  12/22/2024 2256 by Rayne Sanchez RN  Outcome: Progressing  12/22/2024 1358 by Jodee Reis RN  Outcome: Progressing     Problem: Skin/Tissue Integrity  Goal: Absence of new skin breakdown  Description: 1.  Monitor for areas of redness and/or skin breakdown  2.  Assess vascular access sites hourly  3.  Every 4-6 hours minimum:  Change oxygen saturation probe site  4.  Every 4-6 hours:  If on nasal continuous positive airway pressure, respiratory therapy assess nares and determine need for appliance change or resting period.  12/22/2024 2256 by Rayne Sanchez RN  Outcome: Progressing  12/22/2024 1358 by Jodee Reis RN  Outcome: Progressing     Problem: Discharge Planning  Goal: Discharge to home or other facility with appropriate resources  12/22/2024 2256 by Rayne Sanchez RN  Outcome: Progressing  12/22/2024 1358 by Jodee Reis RN  Outcome: Progressing     Problem: Pain  Goal: Verbalizes/displays adequate comfort level or baseline comfort level  12/22/2024 2256 by Rayne Sanchez RN  Outcome: Progressing  12/22/2024 1358 by Jodee Reis RN  Outcome: Progressing     Problem: Safety - Adult  Goal: Free from fall injury  12/22/2024 2256 by Rayne Sanchez RN  Outcome: Progressing  12/22/2024 1358 by Jodee Reis RN  Outcome: Progressing     Problem: Nutrition Deficit:  Goal: Optimize nutritional status  12/22/2024 2256 by Rayne Sanchez RN  Outcome: Progressing  12/22/2024 1358 by Jodee Reis RN  Outcome: Progressing

## 2024-12-23 NOTE — CARE COORDINATION
Updated plan of care. Pre-cert initiated for [facility]. Pt therapies were good but pt has pleur-x cath, o2 and lives alone. Await for final determination. Confirmed with [facility] that they are following, also. If denied, pt can go home with [facility]-orders completed. Will follow-o

## 2024-12-24 ENCOUNTER — APPOINTMENT (OUTPATIENT)
Dept: GENERAL RADIOLOGY | Age: 79
DRG: 542 | End: 2024-12-24
Payer: MEDICARE

## 2024-12-24 LAB
PARTIAL THROMBOPLASTIN TIME: 58.3 SEC (ref 24.5–35.1)
PARTIAL THROMBOPLASTIN TIME: 60.6 SEC (ref 24.5–35.1)
PARTIAL THROMBOPLASTIN TIME: 75.5 SEC (ref 24.5–35.1)
PARTIAL THROMBOPLASTIN TIME: 82.2 SEC (ref 24.5–35.1)

## 2024-12-24 PROCEDURE — 2500000003 HC RX 250 WO HCPCS: Performed by: HOSPITALIST

## 2024-12-24 PROCEDURE — 6370000000 HC RX 637 (ALT 250 FOR IP): Performed by: INTERNAL MEDICINE

## 2024-12-24 PROCEDURE — 6360000002 HC RX W HCPCS: Performed by: INTERNAL MEDICINE

## 2024-12-24 PROCEDURE — 1200000000 HC SEMI PRIVATE

## 2024-12-24 PROCEDURE — 6370000000 HC RX 637 (ALT 250 FOR IP): Performed by: HOSPITALIST

## 2024-12-24 PROCEDURE — 93005 ELECTROCARDIOGRAM TRACING: CPT | Performed by: INTERNAL MEDICINE

## 2024-12-24 PROCEDURE — 71045 X-RAY EXAM CHEST 1 VIEW: CPT

## 2024-12-24 PROCEDURE — 6370000000 HC RX 637 (ALT 250 FOR IP): Performed by: NURSE PRACTITIONER

## 2024-12-24 PROCEDURE — 2700000000 HC OXYGEN THERAPY PER DAY

## 2024-12-24 PROCEDURE — 85730 THROMBOPLASTIN TIME PARTIAL: CPT

## 2024-12-24 RX ORDER — OXYMETAZOLINE HYDROCHLORIDE 0.05 G/100ML
1 SPRAY NASAL 2 TIMES DAILY
Status: COMPLETED | OUTPATIENT
Start: 2024-12-24 | End: 2024-12-25

## 2024-12-24 RX ADMIN — OXYMETAZOLINE HYDROCHLORIDE 1 SPRAY: 0.5 SPRAY NASAL at 22:45

## 2024-12-24 RX ADMIN — SALINE NASAL SPRAY 1 SPRAY: 1.5 SOLUTION NASAL at 22:44

## 2024-12-24 RX ADMIN — GABAPENTIN 300 MG: 300 CAPSULE ORAL at 22:44

## 2024-12-24 RX ADMIN — OXYMETAZOLINE HYDROCHLORIDE 1 SPRAY: 0.5 SPRAY NASAL at 11:51

## 2024-12-24 RX ADMIN — BACLOFEN 5 MG: 10 TABLET ORAL at 22:44

## 2024-12-24 RX ADMIN — HEPARIN SODIUM 15 UNITS/KG/HR: 10000 INJECTION, SOLUTION INTRAVENOUS at 04:37

## 2024-12-24 RX ADMIN — DRONABINOL 5 MG: 2.5 CAPSULE ORAL at 18:00

## 2024-12-24 RX ADMIN — OXYCODONE 10 MG: 5 TABLET ORAL at 15:42

## 2024-12-24 RX ADMIN — DRONABINOL 5 MG: 2.5 CAPSULE ORAL at 09:01

## 2024-12-24 RX ADMIN — GABAPENTIN 300 MG: 300 CAPSULE ORAL at 15:33

## 2024-12-24 RX ADMIN — GABAPENTIN 300 MG: 300 CAPSULE ORAL at 09:01

## 2024-12-24 RX ADMIN — BACLOFEN 5 MG: 10 TABLET ORAL at 15:42

## 2024-12-24 RX ADMIN — Medication 2000 UNITS: at 09:01

## 2024-12-24 RX ADMIN — CHOLESTYRAMINE 4 G: 4 POWDER, FOR SUSPENSION ORAL at 09:02

## 2024-12-24 RX ADMIN — SALINE NASAL SPRAY 1 SPRAY: 1.5 SOLUTION NASAL at 09:05

## 2024-12-24 RX ADMIN — BACLOFEN 5 MG: 10 TABLET ORAL at 09:01

## 2024-12-24 RX ADMIN — SODIUM CHLORIDE, PRESERVATIVE FREE 10 ML: 5 INJECTION INTRAVENOUS at 09:05

## 2024-12-24 RX ADMIN — SODIUM CHLORIDE, PRESERVATIVE FREE 10 ML: 5 INJECTION INTRAVENOUS at 22:44

## 2024-12-24 RX ADMIN — HEPARIN SODIUM 13 UNITS/KG/HR: 10000 INJECTION, SOLUTION INTRAVENOUS at 07:36

## 2024-12-24 RX ADMIN — SALINE NASAL SPRAY 1 SPRAY: 1.5 SOLUTION NASAL at 18:01

## 2024-12-24 ASSESSMENT — PAIN SCALES - GENERAL: PAINLEVEL_OUTOF10: 10

## 2024-12-24 ASSESSMENT — PAIN DESCRIPTION - DESCRIPTORS: DESCRIPTORS: ACHING;STABBING

## 2024-12-24 ASSESSMENT — PAIN DESCRIPTION - LOCATION: LOCATION: ABDOMEN;CHEST

## 2024-12-24 NOTE — CARE COORDINATION
CASE MANAGEMENT.... Received notification that insurance denied patient for [facility]. P2P is being offered. Needs completed by 12/26/24 am. Ph: [phone] opt 4. If appeal needed, Ph: [phone]. Ref # [id number] Insurance ID # [id number]. Nursing updated and will notify Dr Molina. If no SAMEER, plan is [facility] -will need to send home with supplies. Monica Flores updated on the above. Will follow.

## 2024-12-24 NOTE — CARE COORDINATION
CASE MANAGEMENT...  Right sided pleaurx cath remains intact. Was placed on 12/20/24. Per pulm, drain qod. Tolerating baseline o2 2Lnc. Patient continues on iv heparin gtt. PT 17/OT 20. Precert for [facility] initiated yesterday. Messaged Marjorie/Laurie from the facility to check precert status-await input. If denied [facility], plan [facility]-orders in. Will cont to follow along.

## 2024-12-25 LAB
ERYTHROCYTE [DISTWIDTH] IN BLOOD BY AUTOMATED COUNT: 17.5 % (ref 11.5–15)
HCT VFR BLD AUTO: 32.2 % (ref 37–54)
HGB BLD-MCNC: 10.2 G/DL (ref 12.5–16.5)
MCH RBC QN AUTO: 28.7 PG (ref 26–35)
MCHC RBC AUTO-ENTMCNC: 31.7 G/DL (ref 32–34.5)
MCV RBC AUTO: 90.4 FL (ref 80–99.9)
PARTIAL THROMBOPLASTIN TIME: 50.1 SEC (ref 24.5–35.1)
PLATELET # BLD AUTO: 197 K/UL (ref 130–450)
PMV BLD AUTO: 10.2 FL (ref 7–12)
RBC # BLD AUTO: 3.56 M/UL (ref 3.8–5.8)
WBC OTHER # BLD: 23.6 K/UL (ref 4.5–11.5)

## 2024-12-25 PROCEDURE — 6370000000 HC RX 637 (ALT 250 FOR IP): Performed by: INTERNAL MEDICINE

## 2024-12-25 PROCEDURE — 6370000000 HC RX 637 (ALT 250 FOR IP): Performed by: HOSPITALIST

## 2024-12-25 PROCEDURE — 85027 COMPLETE CBC AUTOMATED: CPT

## 2024-12-25 PROCEDURE — 6370000000 HC RX 637 (ALT 250 FOR IP): Performed by: NURSE PRACTITIONER

## 2024-12-25 PROCEDURE — 6360000002 HC RX W HCPCS: Performed by: INTERNAL MEDICINE

## 2024-12-25 PROCEDURE — 85730 THROMBOPLASTIN TIME PARTIAL: CPT

## 2024-12-25 PROCEDURE — 1200000000 HC SEMI PRIVATE

## 2024-12-25 PROCEDURE — 2700000000 HC OXYGEN THERAPY PER DAY

## 2024-12-25 PROCEDURE — 2500000003 HC RX 250 WO HCPCS: Performed by: HOSPITALIST

## 2024-12-25 RX ADMIN — OXYCODONE 10 MG: 5 TABLET ORAL at 09:16

## 2024-12-25 RX ADMIN — SODIUM CHLORIDE, PRESERVATIVE FREE 10 ML: 5 INJECTION INTRAVENOUS at 22:16

## 2024-12-25 RX ADMIN — DRONABINOL 5 MG: 2.5 CAPSULE ORAL at 18:27

## 2024-12-25 RX ADMIN — GABAPENTIN 300 MG: 300 CAPSULE ORAL at 13:52

## 2024-12-25 RX ADMIN — OXYMETAZOLINE HYDROCHLORIDE 1 SPRAY: 0.5 SPRAY NASAL at 21:43

## 2024-12-25 RX ADMIN — HEPARIN SODIUM 13 UNITS/KG/HR: 10000 INJECTION, SOLUTION INTRAVENOUS at 08:01

## 2024-12-25 RX ADMIN — GABAPENTIN 300 MG: 300 CAPSULE ORAL at 21:42

## 2024-12-25 RX ADMIN — SALINE NASAL SPRAY 1 SPRAY: 1.5 SOLUTION NASAL at 09:06

## 2024-12-25 RX ADMIN — BACLOFEN 5 MG: 10 TABLET ORAL at 13:52

## 2024-12-25 RX ADMIN — DRONABINOL 5 MG: 2.5 CAPSULE ORAL at 09:06

## 2024-12-25 RX ADMIN — BACLOFEN 5 MG: 10 TABLET ORAL at 09:06

## 2024-12-25 RX ADMIN — APIXABAN 10 MG: 5 TABLET, FILM COATED ORAL at 21:42

## 2024-12-25 RX ADMIN — OXYMETAZOLINE HYDROCHLORIDE 1 SPRAY: 0.5 SPRAY NASAL at 09:06

## 2024-12-25 RX ADMIN — Medication 2000 UNITS: at 09:06

## 2024-12-25 RX ADMIN — SODIUM CHLORIDE, PRESERVATIVE FREE 10 ML: 5 INJECTION INTRAVENOUS at 09:06

## 2024-12-25 RX ADMIN — GABAPENTIN 300 MG: 300 CAPSULE ORAL at 09:06

## 2024-12-25 RX ADMIN — BACLOFEN 5 MG: 10 TABLET ORAL at 21:42

## 2024-12-25 RX ADMIN — SALINE NASAL SPRAY 1 SPRAY: 1.5 SOLUTION NASAL at 18:28

## 2024-12-25 ASSESSMENT — PAIN DESCRIPTION - DESCRIPTORS
DESCRIPTORS: ACHING;CRUSHING
DESCRIPTORS: ACHING;DISCOMFORT;CRUSHING
DESCRIPTORS: ACHING;DISCOMFORT;SORE

## 2024-12-25 ASSESSMENT — PAIN DESCRIPTION - LOCATION
LOCATION: BACK

## 2024-12-25 ASSESSMENT — PAIN SCALES - GENERAL
PAINLEVEL_OUTOF10: 8
PAINLEVEL_OUTOF10: 7
PAINLEVEL_OUTOF10: 7

## 2024-12-25 ASSESSMENT — PAIN - FUNCTIONAL ASSESSMENT: PAIN_FUNCTIONAL_ASSESSMENT: ACTIVITIES ARE NOT PREVENTED

## 2024-12-25 ASSESSMENT — PAIN DESCRIPTION - ORIENTATION
ORIENTATION: RIGHT;LEFT;MID
ORIENTATION: RIGHT;LEFT;MID

## 2024-12-25 ASSESSMENT — PAIN DESCRIPTION - ONSET: ONSET: ON-GOING

## 2024-12-25 ASSESSMENT — PAIN DESCRIPTION - FREQUENCY: FREQUENCY: CONTINUOUS

## 2024-12-25 ASSESSMENT — PAIN DESCRIPTION - PAIN TYPE: TYPE: CHRONIC PAIN

## 2024-12-26 VITALS
TEMPERATURE: 97.9 F | DIASTOLIC BLOOD PRESSURE: 74 MMHG | WEIGHT: 175.49 LBS | SYSTOLIC BLOOD PRESSURE: 124 MMHG | BODY MASS INDEX: 29.24 KG/M2 | OXYGEN SATURATION: 98 % | HEIGHT: 65 IN | RESPIRATION RATE: 16 BRPM | HEART RATE: 68 BPM

## 2024-12-26 LAB
EKG ATRIAL RATE: 86 BPM
EKG P AXIS: 19 DEGREES
EKG P-R INTERVAL: 92 MS
EKG Q-T INTERVAL: 378 MS
EKG QRS DURATION: 88 MS
EKG QTC CALCULATION (BAZETT): 452 MS
EKG R AXIS: -12 DEGREES
EKG T AXIS: 24 DEGREES
EKG VENTRICULAR RATE: 86 BPM
PARTIAL THROMBOPLASTIN TIME: 35.6 SEC (ref 24.5–35.1)

## 2024-12-26 PROCEDURE — 2500000003 HC RX 250 WO HCPCS: Performed by: HOSPITALIST

## 2024-12-26 PROCEDURE — 6370000000 HC RX 637 (ALT 250 FOR IP): Performed by: INTERNAL MEDICINE

## 2024-12-26 PROCEDURE — 93010 ELECTROCARDIOGRAM REPORT: CPT | Performed by: INTERNAL MEDICINE

## 2024-12-26 PROCEDURE — 6370000000 HC RX 637 (ALT 250 FOR IP): Performed by: HOSPITALIST

## 2024-12-26 PROCEDURE — 6370000000 HC RX 637 (ALT 250 FOR IP): Performed by: NURSE PRACTITIONER

## 2024-12-26 PROCEDURE — 97535 SELF CARE MNGMENT TRAINING: CPT

## 2024-12-26 PROCEDURE — 85730 THROMBOPLASTIN TIME PARTIAL: CPT

## 2024-12-26 PROCEDURE — 36415 COLL VENOUS BLD VENIPUNCTURE: CPT

## 2024-12-26 PROCEDURE — 2700000000 HC OXYGEN THERAPY PER DAY

## 2024-12-26 RX ORDER — BACLOFEN 5 MG/1
5 TABLET ORAL 3 TIMES DAILY
Qty: 90 TABLET | Refills: 0 | Status: SHIPPED | OUTPATIENT
Start: 2024-12-26

## 2024-12-26 RX ORDER — DRONABINOL 5 MG/1
5 CAPSULE ORAL 2 TIMES DAILY WITH MEALS
Qty: 60 CAPSULE | Refills: 0 | Status: SHIPPED | OUTPATIENT
Start: 2024-12-26 | End: 2025-01-25

## 2024-12-26 RX ORDER — POLYETHYLENE GLYCOL 3350 17 G/17G
17 POWDER, FOR SOLUTION ORAL DAILY PRN
Qty: 527 G | Refills: 1 | Status: SHIPPED | OUTPATIENT
Start: 2024-12-26 | End: 2025-02-26

## 2024-12-26 RX ORDER — OXYCODONE HYDROCHLORIDE 10 MG/1
10 TABLET ORAL EVERY 4 HOURS PRN
Qty: 12 TABLET | Refills: 0 | Status: SHIPPED | OUTPATIENT
Start: 2024-12-26 | End: 2024-12-26

## 2024-12-26 RX ORDER — DRONABINOL 5 MG/1
5 CAPSULE ORAL 2 TIMES DAILY WITH MEALS
Qty: 60 CAPSULE | Refills: 0 | Status: SHIPPED | OUTPATIENT
Start: 2024-12-26 | End: 2024-12-26

## 2024-12-26 RX ORDER — FLUTICASONE PROPIONATE 50 MCG
1 SPRAY, SUSPENSION (ML) NASAL DAILY PRN
Qty: 16 G | Refills: 3 | Status: SHIPPED | OUTPATIENT
Start: 2024-12-26

## 2024-12-26 RX ORDER — IPRATROPIUM BROMIDE AND ALBUTEROL SULFATE 2.5; .5 MG/3ML; MG/3ML
3 SOLUTION RESPIRATORY (INHALATION) EVERY 6 HOURS PRN
Qty: 360 ML | Refills: 0 | Status: SHIPPED | OUTPATIENT
Start: 2024-12-26

## 2024-12-26 RX ORDER — OXYCODONE HYDROCHLORIDE 10 MG/1
10 TABLET ORAL EVERY 4 HOURS PRN
Qty: 12 TABLET | Refills: 0 | Status: SHIPPED | OUTPATIENT
Start: 2024-12-26 | End: 2024-12-29

## 2024-12-26 RX ADMIN — Medication 2000 UNITS: at 08:22

## 2024-12-26 RX ADMIN — SODIUM CHLORIDE, PRESERVATIVE FREE 10 ML: 5 INJECTION INTRAVENOUS at 10:49

## 2024-12-26 RX ADMIN — BACLOFEN 5 MG: 10 TABLET ORAL at 08:22

## 2024-12-26 RX ADMIN — DRONABINOL 5 MG: 2.5 CAPSULE ORAL at 08:22

## 2024-12-26 RX ADMIN — SALINE NASAL SPRAY 1 SPRAY: 1.5 SOLUTION NASAL at 08:25

## 2024-12-26 RX ADMIN — GABAPENTIN 300 MG: 300 CAPSULE ORAL at 13:46

## 2024-12-26 RX ADMIN — APIXABAN 10 MG: 5 TABLET, FILM COATED ORAL at 08:22

## 2024-12-26 RX ADMIN — SALINE NASAL SPRAY 1 SPRAY: 1.5 SOLUTION NASAL at 13:50

## 2024-12-26 RX ADMIN — GABAPENTIN 300 MG: 300 CAPSULE ORAL at 08:22

## 2024-12-26 RX ADMIN — BACLOFEN 5 MG: 10 TABLET ORAL at 13:46

## 2024-12-26 ASSESSMENT — PAIN DESCRIPTION - DESCRIPTORS: DESCRIPTORS: ACHING;SORE

## 2024-12-26 ASSESSMENT — PAIN DESCRIPTION - ORIENTATION: ORIENTATION: RIGHT;UPPER

## 2024-12-26 ASSESSMENT — PAIN DESCRIPTION - LOCATION: LOCATION: BACK

## 2024-12-26 ASSESSMENT — PAIN SCALES - GENERAL: PAINLEVEL_OUTOF10: 7

## 2024-12-26 NOTE — PROGRESS NOTES
[facility]  [address], [address]  [place] [address]      Pulmonary Progress Note      SUBJECTIVE:  claims feeling similar with respiratory function, on 2 li NC lying down in bed. About 400 cc dark reddish fluid removed, IV heparin held as still with some bleeding at nose, awaiting ENT evaluation.    OBJECTIVE    Medications    Continuous Infusions:   heparin (PORCINE) Infusion Stopped (24 0845)    sodium chloride         Scheduled Meds:   cholestyramine  1 packet Oral BID    baclofen  5 mg Oral TID    sodium chloride  1 spray Each Nostril TID    droNABinol  2.5 mg Oral BID    gabapentin  300 mg Oral TID    vitamin D  2,000 Units Oral QAM    sodium chloride flush  5-40 mL IntraVENous 2 times per day       PRN Meds:fluticasone, ipratropium 0.5 mg-albuterol 2.5 mg, heparin (porcine), heparin (porcine), sodium chloride, HYDROmorphone, oxyCODONE, sodium chloride flush, sodium chloride, potassium chloride **OR** potassium alternative oral replacement **OR** potassium chloride, magnesium sulfate, ondansetron **OR** ondansetron, polyethylene glycol, acetaminophen **OR** acetaminophen    Physical    VITALS:  /74   Pulse (!) 105   Temp 98 °F (36.7 °C) (Oral)   Resp 20   Ht 1.651 m (5' 5\")   Wt 78.6 kg (173 lb 4.5 oz)   SpO2 96%   BMI 28.84 kg/m²     24HR INTAKE/OUTPUT:      Intake/Output Summary (Last 24 hours) at 2024 1430  Last data filed at 2024 1328  Gross per 24 hour   Intake --   Output 400 ml   Net -400 ml       24HR PULSE OXIMETRY RANGE:    SpO2  Av.3 %  Min: 92 %  Max: 98 %    General appearance: alert, appears stated age, and cooperative  Lungs: ronchi bibasal minimal, (+) right pleurx cath  Heart: regular rate and rhythm, S1, S2 normal, no murmur, click, rub or gallop  Abdomen: soft, non-tender; bowel sounds normal; no masses,  no organomegaly  Extremities: extremities normal, atraumatic, no cyanosis or 
  [facility]  [address], [address]  [place] [address]      Pulmonary Progress Note      SUBJECTIVE:  claims feeling similar with respiratory function, on 2 li NC sitting up at side of bed eating. Still with some minimal epistaxis when he blew his nose, using nasal saline frequently.    OBJECTIVE    Medications    Continuous Infusions:   heparin (PORCINE) Infusion 13 Units/kg/hr (24 0736)    sodium chloride         Scheduled Meds:   droNABinol  5 mg Oral BID with meals    cholestyramine  1 packet Oral BID    baclofen  5 mg Oral TID    sodium chloride  1 spray Each Nostril TID    gabapentin  300 mg Oral TID    vitamin D  2,000 Units Oral QAM    sodium chloride flush  5-40 mL IntraVENous 2 times per day       PRN Meds:fluticasone, ipratropium 0.5 mg-albuterol 2.5 mg, heparin (porcine), heparin (porcine), sodium chloride, HYDROmorphone, oxyCODONE, sodium chloride flush, sodium chloride, potassium chloride **OR** potassium alternative oral replacement **OR** potassium chloride, magnesium sulfate, ondansetron **OR** ondansetron, polyethylene glycol, acetaminophen **OR** acetaminophen    Physical    VITALS:  /60   Pulse 73   Temp 98.1 °F (36.7 °C) (Oral)   Resp 17   Ht 1.651 m (5' 5\")   Wt 79 kg (174 lb 2.6 oz)   SpO2 97%   BMI 28.98 kg/m²     24HR INTAKE/OUTPUT:      Intake/Output Summary (Last 24 hours) at 2024 0857  Last data filed at 2024 2120  Gross per 24 hour   Intake 60 ml   Output 200 ml   Net -140 ml       24HR PULSE OXIMETRY RANGE:    SpO2  Av %  Min: 97 %  Max: 97 %    General appearance: alert, appears stated age, and cooperative  Lungs: ronchi bibasal minimal, (+) right pleurx cath  Heart: regular rate and rhythm, S1, S2 normal, no murmur, click, rub or gallop  Abdomen: soft, non-tender; bowel sounds normal; no masses,  no organomegaly  Extremities: extremities normal, atraumatic, no cyanosis or 
  [facility]  [address], [address]  [place] [address]      Pulmonary Progress Note      SUBJECTIVE:  claims feeling similar with respiratory function, on 1.5 li NC lying down in bed. Using nasal saline frequently, finishing up afrin today, mentions less blood when blows nose.    OBJECTIVE    Medications    Continuous Infusions:   sodium chloride         Scheduled Meds:   apixaban  10 mg Oral BID    Followed by    [START ON 2025] apixaban  5 mg Oral BID    droNABinol  5 mg Oral BID with meals    baclofen  5 mg Oral TID    sodium chloride  1 spray Each Nostril TID    gabapentin  300 mg Oral TID    vitamin D  2,000 Units Oral QAM    sodium chloride flush  5-40 mL IntraVENous 2 times per day       PRN Meds:fluticasone, ipratropium 0.5 mg-albuterol 2.5 mg, sodium chloride, HYDROmorphone, oxyCODONE, sodium chloride flush, sodium chloride, potassium chloride **OR** potassium alternative oral replacement **OR** potassium chloride, magnesium sulfate, ondansetron **OR** ondansetron, polyethylene glycol, acetaminophen **OR** acetaminophen    Physical    VITALS:  /74   Pulse 68   Temp 97.9 °F (36.6 °C) (Oral)   Resp 16   Ht 1.651 m (5' 5\")   Wt 79.6 kg (175 lb 7.8 oz)   SpO2 98%   BMI 29.20 kg/m²     24HR INTAKE/OUTPUT:    No intake or output data in the 24 hours ending 24 0933      24HR PULSE OXIMETRY RANGE:    SpO2  Av %  Min: 94 %  Max: 99 %    General appearance: alert, appears stated age, and cooperative  Lungs: ronchi bibasal minimal, (+) right pleurx cath  Heart: regular rate and rhythm, S1, S2 normal, no murmur, click, rub or gallop  Abdomen: soft, non-tender; bowel sounds normal; no masses,  no organomegaly  Extremities: extremities normal, atraumatic, no cyanosis or edema  Neurologic: Mental status: Alert, oriented, thought content appropriate    Data    CBC:   Recent Labs     24  0701   WBC 23.6*   HGB 10.2* 
  [facility]  [address], [address]  [place] [address]      Pulmonary Progress Note      SUBJECTIVE:  claims feeling stable/slightly better with respiratory function, on 2 li NC sitting up in bed. Pleurx cath placed yesterday with about 1500 cc removed, some bleeding yesterday at site, none today, still on IV heparin.    OBJECTIVE    Medications    Continuous Infusions:   heparin (PORCINE) Infusion 13 Units/kg/hr (24 1230)    sodium chloride         Scheduled Meds:   cholestyramine  1 packet Oral BID    baclofen  5 mg Oral TID    sodium chloride  1 spray Each Nostril TID    droNABinol  2.5 mg Oral BID    gabapentin  300 mg Oral TID    vitamin D  2,000 Units Oral QAM    sodium chloride flush  5-40 mL IntraVENous 2 times per day       PRN Meds:fluticasone, ipratropium 0.5 mg-albuterol 2.5 mg, heparin (porcine), heparin (porcine), sodium chloride, HYDROmorphone, oxyCODONE, sodium chloride flush, sodium chloride, potassium chloride **OR** potassium alternative oral replacement **OR** potassium chloride, magnesium sulfate, ondansetron **OR** ondansetron, polyethylene glycol, acetaminophen **OR** acetaminophen    Physical    VITALS:  BP (!) 144/85   Pulse 91   Temp 98.2 °F (36.8 °C) (Oral)   Resp 20   Ht 1.651 m (5' 5\")   Wt 78.7 kg (173 lb 8 oz)   SpO2 99%   BMI 28.87 kg/m²     24HR INTAKE/OUTPUT:      Intake/Output Summary (Last 24 hours) at 2024 1503  Last data filed at 2024 0744  Gross per 24 hour   Intake --   Output 1400 ml   Net -1400 ml       24HR PULSE OXIMETRY RANGE:    SpO2  Av.4 %  Min: 94 %  Max: 100 %    General appearance: alert, appears stated age, and cooperative  Lungs: ronchi bibasal minimal, (+) right pleurx cath  Heart: regular rate and rhythm, S1, S2 normal, no murmur, click, rub or gallop  Abdomen: soft, non-tender; bowel sounds normal; no masses,  no organomegaly  Extremities: extremities normal, 
  [facility] Quality Flow/Interdisciplinary Rounds Progress Note        Quality Flow Rounds held on December 24, 2024    Disciplines Attending:  Bedside Nurse, , , Nursing Unit Leadership, and      Terell NICHOLSON Juan was admitted on 12/17/2024 10:31 AM    Anticipated Discharge Date:       Disposition:    Tello Score:  Tello Scale Score: 19    [facility] RISK OF UNPLANNED READMISSION 2.0             16.4 Total Score        Discussed patient goal for the day, patient clinical progression, and barriers to discharge.  The following Goal(s) of the Day/Commitment(s) have been identified:  Other  patient to get to therapeutic PTT on heparin lance Dunbar RN  December 24, 2024        
  P Quality Flow/Interdisciplinary Rounds Progress Note        Quality Flow Rounds held on December 19, 2024    Disciplines Attending:  Bedside Nurse, , , and Nursing Unit Leadership    Terell Martinez was admitted on 12/17/2024 10:31 AM    Anticipated Discharge Date:       Disposition:    Tello Score:  Tello Scale Score: 18    [facility] RISK OF UNPLANNED READMISSION 2.0             16.5 Total Score        Discussed patient goal for the day, patient clinical progression, and barriers to discharge.  The following Goal(s) of the Day/Commitment(s) have been identified:   Discharge Planning      Bertha Gilbert RN  December 19, 2024        
  P Quality Flow/Interdisciplinary Rounds Progress Note        Quality Flow Rounds held on December 20, 2024    Disciplines Attending:  Bedside Nurse, , , and Nursing Unit Leadership    Terell Martinez was admitted on 12/17/2024 10:31 AM    Anticipated Discharge Date:       Disposition:    Tello Score:  Tello Scale Score: 19    [facility] RISK OF UNPLANNED READMISSION 2.0             17.8 Total Score        Discussed patient goal for the day, patient clinical progression, and barriers to discharge.  The following Goal(s) of the Day/Commitment(s) have been identified:   Discharge Planning      Bertha Gilbert RN  December 20, 2024        
  P Quality Flow/Interdisciplinary Rounds Progress Note        Quality Flow Rounds held on December 23, 2024    Disciplines Attending:  Bedside Nurse, , , and Nursing Unit Leadership    Terell Martinez was admitted on 12/17/2024 10:31 AM    Anticipated Discharge Date:       Disposition:    Tello Score:  Tello Scale Score: 19    [facility] RISK OF UNPLANNED READMISSION 2.0             16.4 Total Score        Discussed patient goal for the day, patient clinical progression, and barriers to discharge.  The following Goal(s) of the Day/Commitment(s) have been identified:   Discharge Planning      Bertha Gilbert RN  December 23, 2024        
  Palliative Care Department  [phone]  Palliative Care Progress Note  Provider Rosalie Khan, APRN - CNP      PATIENT: Terell Martinez  : 1945  MRN: [id number]  ADMISSION DATE: 2024 10:31 AM  Referring Provider: Keenan Stauffer MD     Palliative Medicine was consulted on hospital day 1 for assistance with Goals of care    HPI:     Clinical Summary:Terell Martinez is a 79 y.o. 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  CT of the spine showed lytic and sclerotic metastasis at T9 and new sclerotic metastasis at L1, multilevel degenerative changes.  CT of the chest showed a PE in the right upper lobe and a large right pleural effusion.  He was admitted to telemetry for further medical management.    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  Intractable pain  Pulmonary embolism  Large right pleural effusion    Symptom management    Pain due to neoplasm  -continue IV dilaudid 0.5 mg every 3 hours as needed  -restart gabapentin 300 mg 3 times daily  -continue oxycodone 10 mg every 4 hours as needed, encouraged to use 1st    Loss of appetite  -Marinol 2.5 mg 2 times daily     Constipation prophylaxis  -Start senna S daily  -glycolax as needed    Palliative Care Encounter / Counseling Regarding Goals of Care  Please see detailed goals of care discussion as below  At this time, Terell Martinez, Does have capacity for medical decision-making.  Capacity is time limited and situation/question specific  Outcome of goals of care meeting:  Goal is to continue chemo to see how he responds  Educated patient to use PO pain meds 1st and dilaudid only for breakthrough  Code status Limited DNRCCA/ DNI  Advanced Directives: DNR CCA form in [facility]  Surrogate/Legal NOK:  Lena Stauffer (Cousin) - Other Relative - [phone]     Spiritual assessment: no spiritual distress 
  Palliative Care Department  [phone]  Palliative Care Progress Note  Provider Rosalie Khan, APRN - CNP      PATIENT: Terell Martinez  : 1945  MRN: [id number]  ADMISSION DATE: 2024 10:31 AM  Referring Provider: Keenan Stauffer MD     Palliative Medicine was consulted on hospital day 1 for assistance with Goals of care    HPI:     Clinical Summary:Terell Martinez is a 79 y.o. 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  CT of the spine showed lytic and sclerotic metastasis at T9 and new sclerotic metastasis at L1, multilevel degenerative changes.  CT of the chest showed a PE in the right upper lobe and a large right pleural effusion.  He was admitted to telemetry for further medical management.    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  Intractable pain  Pulmonary embolism  Large right pleural effusion    Symptom management    Pain due to neoplasm  -continue IV dilaudid 0.5 mg every 3 hours as needed  -restart gabapentin 300 mg 3 times daily  -continue oxycodone 10 mg every 4 hours as needed, encouraged to use 1st    Loss of appetite  -Marinol 2.5 mg 2 times daily     Constipation prophylaxis  -Start senna S daily  -glycolax as needed    Palliative Care Encounter / Counseling Regarding Goals of Care  Please see detailed goals of care discussion as below  At this time, Terell Martinez, Does have capacity for medical decision-making.  Capacity is time limited and situation/question specific  Outcome of goals of care meeting:  Goal is to continue chemo to see how he responds  Educated patient to use PO pain meds 1st and dilaudid only for breakthrough  Going to [facility] if accepted  Code status Limited DNRCCA/ DNI  Advanced Directives: DNR CCA form in [facility]  Surrogate/Legal NOK:  EhLeigh moseleycy (Cousin) - Other Relative - [phone]     Spiritual assessment: no 
  Pleuex catheter drained 400 cc of sanguinous drainage stopped after patient complains of discomfort.   
  Received verbal order from Dr. Molina to resume heparin drip at 13 units/kg/hr.   
  Subjective:    The patient is awake and alert.  Has some discomfort on right side from pleurX cath. On 1.5L NC. Denies n/v/d. He is looking forward to being discharged.    Objective:    /74   Pulse 68   Temp 97.9 °F (36.6 °C) (Oral)   Resp 16   Ht 1.651 m (5' 5\")   Wt 79.6 kg (175 lb 7.8 oz)   SpO2 98%   BMI 29.20 kg/m²     General: Alert, oriented, no acute distress  HEENT: No thrush or mucositis, EOMI, PERRLA  Heart:  RRR, no murmurs, gallops, or rubs.  Lungs:Bilat ronchi in bases, on 1.5L O2  Abd: BS present, nontender, nondistended, no masses  Extrem:  No clubbing, cyanosis, or edema  Lymphatics: No palpable adenopathy in cervical and supraclavicular regions  Skin: Intact, no petechia or purpura    CBC with Differential:    Lab Results   Component Value Date/Time    WBC 23.6 12/25/2024 07:01 AM    RBC 3.56 12/25/2024 07:01 AM    HGB 10.2 12/25/2024 07:01 AM    HCT 32.2 12/25/2024 07:01 AM     12/25/2024 07:01 AM    MCV 90.4 12/25/2024 07:01 AM    MCH 28.7 12/25/2024 07:01 AM    MCHC 31.7 12/25/2024 07:01 AM    RDW 17.5 12/25/2024 07:01 AM    NRBC 1 12/18/2024 06:16 AM    METASPCT 2 12/18/2024 06:16 AM    LYMPHOPCT 8 12/20/2024 06:41 AM    PROMYELOPCT 2 12/19/2024 06:43 AM    MONOPCT 6 12/20/2024 06:41 AM    MYELOPCT 3 12/20/2024 06:41 AM    EOSPCT 0 12/20/2024 06:41 AM    BASOPCT 0 12/20/2024 06:41 AM    MONOSABS 1.37 12/20/2024 06:41 AM    LYMPHSABS 1.76 12/20/2024 06:41 AM    EOSABS 0.00 12/20/2024 06:41 AM    BASOSABS 0.00 12/20/2024 06:41 AM     CMP:    Lab Results   Component Value Date/Time     12/22/2024 09:00 AM    K 3.5 12/22/2024 09:00 AM     12/22/2024 09:00 AM    CO2 22 12/22/2024 09:00 AM    BUN 10 12/22/2024 09:00 AM    CREATININE 1.0 12/22/2024 09:00 AM    LABGLOM 79 12/22/2024 09:00 AM    LABGLOM 71 04/04/2024 09:43 AM    GLUCOSE 134 12/22/2024 09:00 AM    CALCIUM 8.6 12/22/2024 09:00 AM    BILITOT 0.3 12/20/2024 06:41 AM    ALKPHOS 218 12/20/2024 06:41 AM    AST 
  Subjective:    The patient is awake and alert.  He had mild episodes of epistaxis yesterday.  He has back pain probably at the site of the right chest tube.  He has mild dyspnea at rest.  Yesterday he was able to walk in the hallway.  He has no nausea or vomiting.  No abdominal pain.  Diarrhea resolved.  He is now mildly constipated.  He has some discomfort in the legs.    Objective:    /60   Pulse 73   Temp 98.1 °F (36.7 °C) (Oral)   Resp 17   Ht 1.651 m (5' 5\")   Wt 79 kg (174 lb 2.6 oz)   SpO2 97%   BMI 28.98 kg/m²     General: NAD  HEENT: No thrush or mucositis, EOMI, PERRLA  Heart:  RRR, no murmurs  Lungs: Decreased breath sound at the bases.  No rales or rhonchi can be appreciated  Abd: nontender, nondistended, no masses  Extrem:  No clubbing, cyanosis, or edema  Lymphatics: No palpable adenopathy in cervical and supraclavicular regions  Skin: Intact, no petechia or purpura    CBC with Differential:    Lab Results   Component Value Date/Time    WBC 22.9 12/23/2024 04:36 AM    RBC 3.63 12/23/2024 04:36 AM    HGB 10.5 12/23/2024 04:36 AM    HCT 33.3 12/23/2024 04:36 AM     12/23/2024 04:36 AM    MCV 91.7 12/23/2024 04:36 AM    MCH 28.9 12/23/2024 04:36 AM    MCHC 31.5 12/23/2024 04:36 AM    RDW 17.2 12/23/2024 04:36 AM    NRBC 1 12/18/2024 06:16 AM    METASPCT 2 12/18/2024 06:16 AM    LYMPHOPCT 8 12/20/2024 06:41 AM    PROMYELOPCT 2 12/19/2024 06:43 AM    MONOPCT 6 12/20/2024 06:41 AM    MYELOPCT 3 12/20/2024 06:41 AM    EOSPCT 0 12/20/2024 06:41 AM    BASOPCT 0 12/20/2024 06:41 AM    MONOSABS 1.37 12/20/2024 06:41 AM    LYMPHSABS 1.76 12/20/2024 06:41 AM    EOSABS 0.00 12/20/2024 06:41 AM    BASOSABS 0.00 12/20/2024 06:41 AM     CMP:    Lab Results   Component Value Date/Time     12/22/2024 09:00 AM    K 3.5 12/22/2024 09:00 AM     12/22/2024 09:00 AM    CO2 22 12/22/2024 09:00 AM    BUN 10 12/22/2024 09:00 AM    CREATININE 1.0 12/22/2024 09:00 AM    LABGLOM 79 12/22/2024 09:00 
4 Eyes Skin Assessment     NAME:  Terell Martinez  YOB: 1945  MEDICAL RECORD NUMBER:  [id number]    The patient is being assessed for  Admission    I agree that at least one RN has performed a thorough Head to Toe Skin Assessment on the patient. ALL assessment sites listed below have been assessed.      Areas assessed by both nurses:    Head, Face, Ears, Shoulders, Back, Chest, Arms, Elbows, Hands, Sacrum. Buttock, Coccyx, Ischium, and Legs. Feet and Heels        Does the Patient have a Wound? No noted wound(s)       Tello Prevention initiated by RN: YES  Wound Care Orders initiated by RN: No    Pressure Injury (Stage 3,4, Unstageable, DTI, NWPT, and Complex wounds) if present, place Wound referral order by RN under : No    New Ostomies, if present place, Ostomy referral order under : No     Nurse 1 eSignature: Electronically signed by FREDERICK STEIN RN on 12/18/24 at 1:15 AM EST    **SHARE this note so that the co-signing nurse can place an eSignature**    Nurse 2 eSignature: Electronically signed by Rayne Sanchez RN on 12/19/24 at 1:36 AM EST  
Attempted to call nurse to nurse report to [facility]. Message left with  requesting call back.   
Called Dr. Clark via answering service regarding heparin orders.  
Called consult for goals of care  metastatic lung ca to  Dr. Del Angel  
Called insurance at [phone] option 4. Awaiting someone on the phone. Have been on the hold awaiting for the next associate for 22 minutes. Claims I did not leave a phone number when I left a message to receive a call back and he attempted to call the floor but according to  nurses did not know who I was. He is reaching out to his RN to see if I can even schedule a peer to peer. Peer to peer scheduled between 230 and 330 pm today with a Dr. Yuan.     Jocelyne Molina DO      Talked to [facility] reviewer at 0215 pm. Explained Terell's current health status including being on eliquis, metastatic lung cancer and his new pleurx and appeal is now approved for him to go to Rehab. Reviewer Dr. Yuan has approved rehab.     Jocelyne Molina DO    
Calling to do peer to peer. Left message on scheduling line for peer to peer call back.     Jocelyne Molina, DO    
Consult for goals of care end stage disease called to palliative care  
DVT Prophylaxis Adjustment Policy (DVT Prophylaxis)     This patient is on DVT Prophylaxis medication that requires a dose adjustment      Date Body Weight IBW  Adjusted BW SCr  CrCl Dialysis status   12/18/2024 79.4 kg (175 lb) Ideal body weight: 61.5 kg (135 lb 9.3 oz)  Adjusted ideal body weight: 68.7 kg (151 lb 5.6 oz) Serum creatinine: 1 mg/dL 12/18/24 0616  Estimated creatinine clearance: 57 mL/min N/a       Pharmacy has dose-adjusted the DVT Prophylaxis regimen to match   the recommendations from the following table        Ordered Medication:Lovenox 1 mg/kg daily    Order Changed/converted to: Lovenox 1 mg/kg twice daily      These changes were made per protocol according to the [facility] Pharmacist   Review for Appropriate Use and Automatic Dose Adjustments of   Subcutaneous Anticoagulants Policy     *Please note this dose may need readjusted if patient's condition changes.    Please contact pharmacy with any questions regarding these changes.    becca evans RPH  12/18/2024  1:09 PM    
Database initiated. Patient is A&O comes in from home alone. He uses no assistive devices and wears 2-3 liters oxygen at baseline.   
Dr. Clark's answering service notified of pleurex drainage.   
Dr. Guerrero notified of new ENT consult. Electronically signed by Edgar Allen RN on 12/22/2024 at 8:37 AM    
Dr. Molina called to update on patient status. See new consult to vascular - PerfectServed to Dr. Sargent at this time.  
Home medication still in process, family will bring in updated medication list in morning. Will update medical once medication list completed.   
Internal Medicine Progress Note    Patient's name: Terell Martinez  : 1945  Chief complaints (on day of admission): Back Pain (\"All over\" back pain, ongoing. Sent over from oncologist, recently started new chemo tx. Stage 4 lung cancer) and Epistaxis (States he has been blowing nose and having blood coming out, onset this morning. )  Admission date: 2024  Date of service: 2024   Room: [address]  Primary care physician: Awadalla, Maged I, MD  Reason for visit: Follow-up for intractable back pain    Subjective  Terell is laying in bed sleeping. He states that he remains weak. He had a difficult time ambulating to the restroom this am. He states that there is no way he could go home.     Review of Systems  Full 10 point review of systems negative unless mentioned above.    Hospital Medications  Current Facility-Administered Medications   Medication Dose Route Frequency Provider Last Rate Last Admin    apixaban (ELIQUIS) tablet 10 mg  10 mg Oral BID Jocelyne Molina DO   10 mg at 24    Followed by    [START ON 2025] apixaban (ELIQUIS) tablet 5 mg  5 mg Oral BID Jocelyne Molina DO        droNABinol (MARINOL) capsule 5 mg  5 mg Oral BID with meals Nicolas Mcgovern DO   5 mg at 24    fluticasone (FLONASE) 50 MCG/ACT nasal spray 1 spray  1 spray Each Nostril Daily PRN Keenan Stauffer MD        baclofen (LIORESAL) tablet 5 mg  5 mg Oral TID Jodee Munoz APRN - CNP   5 mg at 24    sodium chloride (OCEAN, BABY AYR) 0.65 % nasal spray 1 spray  1 spray Each Nostril TID Warner Clark MD   1 spray at 24    ipratropium 0.5 mg-albuterol 2.5 mg (DUONEB) nebulizer solution 1 Dose  1 Dose Inhalation Q6H PRN Warner Clark MD        sodium chloride (OCEAN, BABY AYR) 0.65 % nasal spray 1 spray  1 spray Each Nostril PRN Sandeep Bolaños MD   1 spray at 24    HYDROmorphone (DILAUDID) injection 0.5 mg  0.5 mg IntraVENous Q3H PRN Keenan Stauffer 
Internal Medicine Progress Note    Patient's name: Terell Martinez  : 1945  Chief complaints (on day of admission): Back Pain (\"All over\" back pain, ongoing. Sent over from oncologist, recently started new chemo tx. Stage 4 lung cancer) and Epistaxis (States he has been blowing nose and having blood coming out, onset this morning. )  Admission date: 2024  Date of service: 2024   Room: [address] SURG  Primary care physician: Awadalla, Maged I, MD  Reason for visit: Follow-up for intractable back pain    Subjective  Terell is laying in bed sleeping. He arouses to voice this am but returns to sleep. Discussed with overnight nursing. He had small amount of bleeding on sneezing. No other issues reported overnight.     Review of Systems  Full 10 point review of systems negative unless mentioned above.    Hospital Medications  Current Facility-Administered Medications   Medication Dose Route Frequency Provider Last Rate Last Admin    droNABinol (MARINOL) capsule 5 mg  5 mg Oral BID with meals Nicolas Mcgovern DO   5 mg at 24 1625    fluticasone (FLONASE) 50 MCG/ACT nasal spray 1 spray  1 spray Each Nostril Daily PRN Keenan Stauffer MD        cholestyramine (QUESTRAN) packet 4 g  1 packet Oral BID Jodee Munoz APRN - CNP   4 g at 24 0848    baclofen (LIORESAL) tablet 5 mg  5 mg Oral TID Jodee Munoz APRN - CNP   5 mg at 24 194    sodium chloride (OCEAN, BABY AYR) 0.65 % nasal spray 1 spray  1 spray Each Nostril TID Warner Clark MD   1 spray at 24 1625    ipratropium 0.5 mg-albuterol 2.5 mg (DUONEB) nebulizer solution 1 Dose  1 Dose Inhalation Q6H PRN Warner Clark MD        heparin 25,000 units in dextrose 5% 250 mL (premix) infusion  5-30 Units/kg/hr IntraVENous Continuous Jocelyne Molina DO 10.3 mL/hr at 24 0736 13 Units/kg/hr at 24 0736    heparin (porcine) injection 6,400 Units  80 Units/kg IntraVENous PRN Warner Clark MD   6,400 Units at 
Internal Medicine Progress Note    Patient's name: Terell Martinez  : 1945  Chief complaints (on day of admission): Back Pain (\"All over\" back pain, ongoing. Sent over from oncologist, recently started new chemo tx. Stage 4 lung cancer) and Epistaxis (States he has been blowing nose and having blood coming out, onset this morning. )  Admission date: 2024  Date of service: 2024   Room: [address]  Primary care physician: Awadalla, Maged I, MD  Reason for visit: Follow-up for intractable back pain    Subjective  Terell was seen siting up in bed. He tells me his \"pain is up there.\" He states medications are helping his pain. He was seen on 2LNC and denies difficulty breathing.     Review of Systems  Full 10 point review of systems negative unless mentioned above.    Hospital Medications  Current Facility-Administered Medications   Medication Dose Route Frequency Provider Last Rate Last Admin    fluticasone (FLONASE) 50 MCG/ACT nasal spray 1 spray  1 spray Each Nostril Daily PRN Keenan Stauffer MD        cholestyramine (QUESTRAN) packet 4 g  1 packet Oral BID Jodee Munoz APRN - CNP   4 g at 24    baclofen (LIORESAL) tablet 5 mg  5 mg Oral TID Jodee Munoz APRN - CNP   5 mg at 24 214    sodium chloride (OCEAN, BABY AYR) 0.65 % nasal spray 1 spray  1 spray Each Nostril TID Warner Clark MD   1 spray at 24 1727    ipratropium 0.5 mg-albuterol 2.5 mg (DUONEB) nebulizer solution 1 Dose  1 Dose Inhalation Q6H PRN Warner Clark MD        heparin 25,000 units in dextrose 5% 250 mL (premix) infusion  5-30 Units/kg/hr IntraVENous Continuous Warner Clark MD 10.3 mL/hr at 24 0628 13 Units/kg/hr at 24    heparin (porcine) injection 6,400 Units  80 Units/kg IntraVENous PRN Warner Clark MD   6,400 Units at 24    heparin (porcine) injection 3,200 Units  40 Units/kg IntraVENous PRN Warner Clark MD        droNABinol (MARINOL) capsule 2.5 
Internal Medicine Progress Note    Patient's name: Terell Martinez  : 1945  Chief complaints (on day of admission): Back Pain (\"All over\" back pain, ongoing. Sent over from oncologist, recently started new chemo tx. Stage 4 lung cancer) and Epistaxis (States he has been blowing nose and having blood coming out, onset this morning. )  Admission date: 2024  Date of service: 2024   Room: [address]  Primary care physician: Awadalla, Maged I, MD  Reason for visit: Follow-up for intractable back pain    Subjective  Terell is laying in bed sleeping. He arouses to voice but returns to sleep. Peer to peer scheduled for tomorrow for possible rehab placement. Nursing reports that patient slept well overnight. No issues.     Review of Systems  Full 10 point review of systems negative unless mentioned above.    Hospital Medications  Current Facility-Administered Medications   Medication Dose Route Frequency Provider Last Rate Last Admin    oxymetazoline (AFRIN) 0.05 % nasal spray 1 spray  1 spray Each Nostril BID Warner Clark MD   1 spray at 24 2245    droNABinol (MARINOL) capsule 5 mg  5 mg Oral BID with meals Nicolas Mcgovern DO   5 mg at 24 1800    fluticasone (FLONASE) 50 MCG/ACT nasal spray 1 spray  1 spray Each Nostril Daily PRN Keenan Stauffer MD        baclofen (LIORESAL) tablet 5 mg  5 mg Oral TID Jodee Munoz, JANIE - CNP   5 mg at 24 2244    sodium chloride (OCEAN, BABY AYR) 0.65 % nasal spray 1 spray  1 spray Each Nostril TID Warner Clark MD   1 spray at 24 2244    ipratropium 0.5 mg-albuterol 2.5 mg (DUONEB) nebulizer solution 1 Dose  1 Dose Inhalation Q6H PRN Warner Clark MD        heparin 25,000 units in dextrose 5% 250 mL (premix) infusion  5-30 Units/kg/hr IntraVENous Continuous Jocelyne Molina DO 10.3 mL/hr at 24 1849 13 Units/kg/hr at 24 184    heparin (porcine) injection 6,400 Units  80 Units/kg IntraVENous PRN Warner Clark MD  
Nutrition Assessment     Type and Reason for Visit: Reassess    Nutrition Recommendations/Plan:   Continue Regular diet.  Modified ONS to HP gelatein BID d/t refusal of Ensure.     Malnutrition Assessment:  Malnutrition Status: At risk for malnutrition    Nutrition Assessment:  Pt seen w/family in room. States he doesn't like the food here. Has been eating all per flowsheets. Does not like the Ensure now-too much he says. Will modify to HP gelatein BID as discussed w/pt. Plan is [facility] today.    Estimated Daily Nutrient Needs:  Energy (kcal):  5131-9828 Weight Used for Energy Requirements: Current     Protein (g):  80-90 Weight Used for Protein Requirements: Ideal        Fluid (ml/day):  4873-3543 Method Used for Fluid Requirements: 1 ml/kcal    Nutrition Related Findings:   A& O, soft/round abd, + BS, no edema, I&O WDL, Appetite stimulant Wound Type: None    Current Nutrition Therapies:    ADULT DIET; Regular  ADULT ORAL NUTRITION SUPPLEMENT; Lunch, Dinner; Fortified Gelatin Oral Supplement    Anthropometric Measures:  Height: 165.1 cm (5' 5\")  Current Body Wt: 79.6 kg (175 lb 7.8 oz)   BMI: 29.2        Nutrition Diagnosis:   No nutrition diagnosis at this time      Nutrition Interventions:   Food and/or Nutrient Delivery: Continue Current Diet, Modify Oral Nutrition Supplement  Nutrition Education/Counseling: No recommendation at this time  Coordination of Nutrition Care: Continue to monitor while inpatient       Goals:  Goals: Meet at least 75% of estimated needs, by next RD assessment  Type of Goal: New goal  Previous Goal Met: New Goal    Nutrition Monitoring and Evaluation:   Behavioral-Environmental Outcomes: None Identified  Food/Nutrient Intake Outcomes: Food and Nutrient Intake, Supplement Intake  Physical Signs/Symptoms Outcomes: Biochemical Data, Nutrition Focused Physical Findings, Skin, Weight, Fluid Status or Edema    Discharge Planning:    Continue Oral Nutrition Supplement, Continue current diet 
Occupational Therapy    OCCUPATIONAL THERAPY INITIAL EVALUATION    [facility]   [address], [place]         Date:2024                                                  Patient Name: Terell Martinez    MRN: [id number]    : 1945    Room: [address] [place] [address]      Evaluating OT: Sherri Freitas OTR/L   [id number]      Referring Provider:    Keenan Stauffer MD       Specific Provider Orders/Date:OT eval and treat 2024      Diagnosis:  Intractable pain [R52]  Intractable back pain [M54.9]  Lytic bone lesions on xray [M89.9]  Metastatic malignant neoplasm, unspecified site (HCC) [C79.9]  Acute pulmonary embolism without acute cor pulmonale, unspecified pulmonary embolism type (HCC) [I26.99]     Pertinent Medical History: Lung CA, COPD,  thoracentesis was 10/24,     Precautions:  Fall Risk, O2     Assessment of current deficits    [x] Functional mobility  [x]ADLs  [x] Strength               [x]Cognition    [x] Functional transfers   [x] IADLs         [x] Safety Awareness   [x]Endurance    [] Fine Coordination              [x] Balance      [] Vision/perception   []Sensation     []Gross Motor Coordination  [] ROM  [] Delirium                   [] Motor Control     OT PLAN OF CARE   OT POC based on physician orders, patient diagnosis and results of clinical assessment    Frequency/Duration  1-3 days/wk for 2 - 4weeks PRN   Specific OT Treatment Interventions to include:   ADL retraining/adapted techniques and AE recommendations to increase functional independence within precautions                    Energy conservation techniques to improve tolerance for selfcare routine   Functional transfer/mobility training/DME recommendations for increased independence, safety and fall prevention         Patient/family education to increase safety and functional independence             Environmental modifications for safe mobility and completion of ADLs                         
Occupational Therapy  OT BEDSIDE TREATMENT NOTE      Date:2024  Patient Name: Terell Martinez  MRN: [id number]  : 1945  Room: [address] [place], [place] [address]     Evaluating OT: Sherri Freitas OTR/L   [id number]       Referring Provider:    Keenan Stauffer MD        Specific Provider Orders/Date:OT eval and treat 2024       Diagnosis:  Intractable pain [R52]  Intractable back pain [M54.9]  Lytic bone lesions on xray [M89.9]  Metastatic malignant neoplasm, unspecified site (HCC) [C79.9]  Acute pulmonary embolism without acute cor pulmonale, unspecified pulmonary embolism type (HCC) [I26.99]     Pertinent Medical History: Lung CA, COPD,  thoracentesis was 10/24,     Precautions:  Fall Risk, O2, pleurx R side      Assessment of current deficits    [x] Functional mobility            [x]ADLs           [x] Strength                  [x]Cognition    [x] Functional transfers          [x] IADLs         [x] Safety Awareness   [x]Endurance    [] Fine Coordination                         [x] Balance      [] Vision/perception   []Sensation      []Gross Motor Coordination             [] ROM           [] Delirium                   [] Motor Control      OT PLAN OF CARE   OT POC based on physician orders, patient diagnosis and results of clinical assessment     Frequency/Duration  1-3 days/wk for 2 - 4weeks PRN   Specific OT Treatment Interventions to include:   ADL retraining/adapted techniques and AE recommendations to increase functional independence within precautions                    Energy conservation techniques to improve tolerance for selfcare routine   Functional transfer/mobility training/DME recommendations for increased independence, safety and fall prevention         Patient/family education to increase safety and functional independence             Environmental modifications for safe mobility and completion of ADLs                             Therapeutic activity to improve functional performance during ADLs.                      
Occupational Therapy  OT BEDSIDE TREATMENT NOTE      Date:2024  Patient Name: Terell Martinez  MRN: [id number]  : 1945  Room: [address] [place], [place] [address]     Evaluating OT: Sherri Freitas OTR/L   [id number]       Referring Provider:    Keenan Stauffer MD        Specific Provider Orders/Date:OT eval and treat 2024       Diagnosis:  Intractable pain [R52]  Intractable back pain [M54.9]  Lytic bone lesions on xray [M89.9]  Metastatic malignant neoplasm, unspecified site (HCC) [C79.9]  Acute pulmonary embolism without acute cor pulmonale, unspecified pulmonary embolism type (HCC) [I26.99]     Pertinent Medical History: Lung CA, COPD,  thoracentesis was 10/24,     Precautions:  Fall Risk, O2, pleurx R side      Assessment of current deficits    [x] Functional mobility            [x]ADLs           [x] Strength                  [x]Cognition    [x] Functional transfers          [x] IADLs         [x] Safety Awareness   [x]Endurance    [] Fine Coordination                         [x] Balance      [] Vision/perception   []Sensation      []Gross Motor Coordination             [] ROM           [] Delirium                   [] Motor Control      OT PLAN OF CARE   OT POC based on physician orders, patient diagnosis and results of clinical assessment     Frequency/Duration  1-3 days/wk for 2 - 4weeks PRN   Specific OT Treatment Interventions to include:   ADL retraining/adapted techniques and AE recommendations to increase functional independence within precautions                    Energy conservation techniques to improve tolerance for selfcare routine   Functional transfer/mobility training/DME recommendations for increased independence, safety and fall prevention         Patient/family education to increase safety and functional independence             Environmental modifications for safe mobility and completion of ADLs                             Therapeutic activity to improve functional performance during ADLs.                      
PTT 82.2 this morning, decreased heparin drip by 2 units/kg/hour. Now running at 13 units/ kg/ hour   
Perfect serve message sent to Dr. Stauffer on changing 0.5mg Dilaudid IM to change to 0.5mg Dilaudid IV. Dr. Stauffer ok'd  
Physical Therapy  Facility/Department: [address]  Physical Therapy Initial Assessment    Name: Terell Martinez  : 1945  MRN: [id number]  Date of Service: 2024          Patient Diagnosis(es): The primary encounter diagnosis was Metastatic malignant neoplasm, unspecified site (HCC). Diagnoses of Lytic bone lesions on xray, Acute pulmonary embolism without acute cor pulmonale, unspecified pulmonary embolism type (HCC), and Intractable back pain were also pertinent to this visit.  Past Medical History:  has a past medical history of Adenocarcinoma, lung, right (HCC), Cancer (HCC), COPD (chronic obstructive pulmonary disease) (HCC), Hyperlipidemia, Secondary cancer of bone (HCC), Secondary cancer of brain (HCC), and Status post stereotactic radiosurgery.  Past Surgical History:  has a past surgical history that includes CT NEEDLE BIOPSY LUNG PERCUTANEOUS (2024); Cataract extraction (Bilateral, 2024); back surgery (); Total knee arthroplasty (Left); Colonoscopy; eye surgery; Tonsillectomy; hernia repair; and joint replacement.         Requires PT Follow-Up: Yes    Evaluating Therapist: Sharron Oglesby PT     Referring Provider:      Keenan Stauffer MD       PT order : PT eval and treat     Room #: 725  DIAGNOSIS: The primary encounter diagnosis was Metastatic malignant neoplasm, unspecified site (HCC). Diagnoses of Lytic bone lesions on xray, Acute pulmonary embolism without acute cor pulmonale, unspecified pulmonary embolism type (HCC), and Intractable back pain were also pertinent to this visit.    PRECAUTIONS: falls     Social:  Pt lives alone  in a  1  floor plan   1 step  to enter.  Prior to admission pt walked with  no AD. Has ww and home O2      Initial Evaluation  Date:  2024  Treatment      Short Term/ Long Term   Goals   Was pt agreeable to Eval/treatment?  Yes      Does pt have pain?  Minimal back pain, pt reports significantly improved since admission      Bed Mobility 
Physical Therapy Daily Treat         Evaluating Therapist: Sharron Oglesby, PT      Referring Provider:      Keenan Stauffer MD         PT order : PT eval and treat      Room #: 725  DIAGNOSIS: The primary encounter diagnosis was Metastatic malignant neoplasm, unspecified site (HCC). Diagnoses of Lytic bone lesions on xray, Acute pulmonary embolism without acute cor pulmonale, unspecified pulmonary embolism type (HCC), and Intractable back pain were also pertinent to this visit.     PRECAUTIONS: falls      Social:  Pt lives alone  in a  1  floor plan   1 step  to enter.  Prior to admission pt walked with  no AD. Has ww and home O2        Initial Evaluation  Date:  12/18/2024  Treatment  12/22/24      Short Term/ Long Term   Goals   Was pt agreeable to Eval/treatment?  Yes  Yes      Does pt have pain?  Minimal back pain, pt reports significantly improved since admission  RT sided posterior mid/upper back @ plurex site. Hypersensitive to light touch bed surface and when lightly touched.       Bed Mobility  Rolling:  independent    Supine to sit:  independent   Sit to supine:  SBA   Scooting:  independent in sit   Roll: Indep   Sup-sit: S/Indep   Scoot EOB: Indep  Independent    Transfers Sit to stand:  SBA   Stand to sit:  SBA   Stand pivot:  NT   Bed-stand: S/SBA   Stand-toilet: S/SBA   Toilet to stand: S/SBA   Stand-chair: indep/S  Independent    Ambulation    15 and 160   feet with  no AD  with  SBA   15' to toilet initial w/o AD then 200' with no AD and 2LNC S/SBA.  SPO2 96% pre walk and 92% during/post 200' walk.   200  feet with no AD/AAD   with  independent          Stair negotiation: ascended and descended NT   NT  1-4  steps with  B  rail with  independent    LE ROM  WFL  WFL LE's     LE strength  4/ 5   WFL LE's     AM- PAC RAW score   20/ 24   20/24, steps NA        Pt is alert and oriented x4  Balance: No AD uses with + instab mild x 2 @ 200' walk with self-recovery       Therapeutic Exercises:  
Pleuex catheter drained 300 cc of sanguinous drainage. Pt tolerated well.   
RN call out Dr. Molina re: relay need peer to peer   
RN call out to Dr. Molina re:anticoagulation orders to bridge. See new orders  
Spiritual Health History and Assessment/Progress Note  [facility]    Initial Encounter, Palliative Care,  , End of Life, Grief and loss, Life Adjustments,      Name: Terell Martinez MRN: [id number]    Age: 79 y.o.     Sex: male   Language: English   Christianity: None   Intractable pain     Date: 12/18/2024                           Spiritual Assessment began in [facility]        Referral/Consult From: Palliative Care   Encounter Overview/Reason: Initial Encounter, Palliative Care  Service Provided For: Patient, Friend    Dayami, Belief, Meaning:   Patient identifies as spiritual, is connected with a dayami tradition or spiritual practice, and has beliefs or practices that help with coping during difficult times  Family/Friends identify as spiritual, are connected with a dayami tradition or spiritual practice, and have beliefs or practices that help with coping during difficult times      Importance and Influence:  Patient has spiritual/personal beliefs that influence decisions regarding their health  Family/Friends have spiritual/personal beliefs that influence decisions regarding the patient's health    Community:  Patient feels well-supported. Support system includes: Children, Friends, and Extended family  Family/Friends feel well-supported. Support system includes: Friends and Extended family    Assessment and Plan of Care:     Patient Interventions include: Facilitated expression of thoughts and feelings, Explored spiritual coping/struggle/distress, Affirmed coping skills/support systems, Provided sacramental/Buddhist ritual, and Facilitated life review and/ or legacy  Family/Friends Interventions include: Facilitated expression of thoughts and feelings and Provided sacramental/Buddhist ritual    Patient Plan of Care: Spiritual Care available upon further referral  Family/Friends Plan of Care: Spiritual Care available upon further referral    Electronically signed by KAT Meza 
Spoke to Dr. Molina regarding blood tinged sputum and blood-tinged nasal drainage, re-hold hep gtt at this time until ENT evals.  
Spoke to Dr. Shaniqua henderson to restart heparin drip  
Tolerating AC  Recommend AC  No plan for filter    Adebayo Sargent MD    
Upon assessment of the patient's pleurx catheter site, the dressing was saturated with blood and it was leaking onto the gown and the bed pad. I reinforced the dressing with a pressure dressing. Dr. Clark was notified. Dr. Molina was on the floor and advised me to stop the heparin drip and draw another aptt. New aPTT was 87.7 I will titrate the drip accordingly. The patient's O2 sat was 98% on 2L NC.  
Wheelchair transport set up for 6 pm with PAS, facility and family updated.   
  WBC 17.8* 24.7* 22.9*   HGB 11.2* 11.6* 10.7*   HCT 35.0* 36.1* 33.2*   MCV 90.2 89.4 89.5    166 166       BMP:  Recent Labs     12/18/24  0616 12/19/24  0643 12/20/24  0641    138 139   K 3.9 3.8 3.6    107 107   CO2 23 22 22   PHOS 2.6  --   --    BUN 16 12 9   CREATININE 1.0 0.8 0.7    ALB:3,BILIDIR:3,BILITOT:3,ALKPHOS:3)@    PT/INR: No results for input(s): \"PROTIME\", \"INR\" in the last 72 hours.    ABG:   No results for input(s): \"PH\", \"PO2\", \"PCO2\", \"HCO3\", \"BE\", \"O2SAT\", \"METHB\", \"O2HB\", \"COHB\", \"O2CON\", \"HHB\", \"THB\" in the last 72 hours.          Radiology/Other tests reviewed: none    Assessment:     Principal Problem:    Intractable pain  Active Problems:    Pleural effusion    Adenocarcinoma, lung, right (HCC)    Metastasis to brain (HCC)    Status post stereotactic radiosurgery    COPD (chronic obstructive pulmonary disease) (HCC)    Moderate protein-calorie malnutrition (HCC)    Acute pulmonary embolism without acute cor pulmonale (HCC)  Resolved Problems:    * No resolved hospital problems. *      Plan:       Pt is agreeable to pleurx cath placement, supposedly told nurse about this yesterday but wasn't communicated to me. Will have IR place this, not sure if can be done today, will need heparin held for procedure if so, will hold it now in case can be done  Cont with oxygen, taper as tolerated  Cont with duonebs prn, observe need  Cont with nasal saline spray  OOB to chair as tolerated      Time at the bedside, reviewing labs and radiographs, reviewing notes and consultations, discussing with staff and family was more than 50 minutes.      Thanks for letting us see this patient in consultation.  Please contact us with any questions. Office [phone] or after hours through [employer], x [phone].    
12/21/24  0110 12/22/24  0900 12/23/24  0436   WBC 28.8* 22.3* 22.9*   HGB 10.5* 10.9* 10.5*   HCT 32.4* 35.0* 33.3*   MCV 89.3 91.6 91.7    173 144       BMP:  Recent Labs     12/22/24  0900      K 3.5      CO2 22   BUN 10   CREATININE 1.0    ALB:3,BILIDIR:3,BILITOT:3,ALKPHOS:3)@    PT/INR:   Recent Labs     12/20/24  1057   PROTIME 13.4*   INR 1.3       ABG:   No results for input(s): \"PH\", \"PO2\", \"PCO2\", \"HCO3\", \"BE\", \"O2SAT\", \"METHB\", \"O2HB\", \"COHB\", \"O2CON\", \"HHB\", \"THB\" in the last 72 hours.          Radiology/Other tests reviewed: none    Assessment:     Principal Problem:    Intractable pain  Active Problems:    Pleural effusion    Adenocarcinoma, lung, right (HCC)    Metastasis to brain (HCC)    Status post stereotactic radiosurgery    COPD (chronic obstructive pulmonary disease) (HCC)    Moderate protein-calorie malnutrition (HCC)    Acute pulmonary embolism without acute cor pulmonale (HCC)  Resolved Problems:    * No resolved hospital problems. *      Plan:       Cont with Pleurx cath drainage every other day and record amount, non-clotting fluid when examined, may be old blood, will see what will look like when drained again in a few days  Cont with oxygen, taper as tolerated  Cont with duonebs prn, observe need and frequency of use  Cont with nasal saline spray, afrin if needed  Resume anticoagulation, observe tolerance  OOB to chair as tolerated      Time at the bedside, reviewing labs and radiographs, reviewing notes and consultations, discussing with staff and family was more than 50 minutes.      Thanks for letting us see this patient in consultation.  Please contact us with any questions. Office [phone] or after hours through [employer], x [phone].    
thought content appropriate    Data    CBC:   Recent Labs     12/23/24  0436 12/25/24  0701   WBC 22.9* 23.6*   HGB 10.5* 10.2*   HCT 33.3* 32.2*   MCV 91.7 90.4    89       BMP:  No results for input(s): \"NA\", \"K\", \"CL\", \"CO2\", \"PHOS\", \"BUN\", \"CREATININE\" in the last 72 hours.    Invalid input(s): \"CA\"   ALB:3,BILIDIR:3,BILITOT:3,ALKPHOS:3)@    PT/INR:   No results for input(s): \"PROTIME\", \"INR\" in the last 72 hours.      ABG:   No results for input(s): \"PH\", \"PO2\", \"PCO2\", \"HCO3\", \"BE\", \"O2SAT\", \"METHB\", \"O2HB\", \"COHB\", \"O2CON\", \"HHB\", \"THB\" in the last 72 hours.          Radiology/Other tests reviewed: none    Assessment:     Principal Problem:    Intractable pain  Active Problems:    Pleural effusion    Adenocarcinoma, lung, right (HCC)    Metastasis to brain (HCC)    Status post stereotactic radiosurgery    COPD (chronic obstructive pulmonary disease) (HCC)    Moderate protein-calorie malnutrition (HCC)    Acute pulmonary embolism without acute cor pulmonale (HCC)  Resolved Problems:    * No resolved hospital problems. *      Plan:       Cont with Pleurx cath drainage every other day and record amount  Cont with oxygen, taper as tolerated  Cont with duonebs prn, observe need and frequency of use  Cont with nasal saline spray, will give afrin bid x2 days  Cont with anticoagulation, observe tolerance, can switch to DOAC and observe tolerance  OOB to chair as tolerated      Time at the bedside, reviewing labs and radiographs, reviewing notes and consultations, discussing with staff and family was more than 50 minutes.      Thanks for letting us see this patient in consultation.  Please contact us with any questions. Office [phone] or after hours through [employer], x [phone].    
IntraVENous, PRN, Keenan Stauffer MD    0.9 % sodium chloride infusion, , IntraVENous, PRNEh Ryan S, MD    potassium chloride (KLOR-CON M) extended release tablet 40 mEq, 40 mEq, Oral, PRN **OR** potassium bicarb-citric acid (EFFER-K) effervescent tablet 40 mEq, 40 mEq, Oral, PRN **OR** potassium chloride 10 mEq/100 mL IVPB (Peripheral Line), 10 mEq, IntraVENous, PRNEh Ryan S, MD    magnesium sulfate 2000 mg in 50 mL IVPB premix, 2,000 mg, IntraVENous, PRNEh Ryan S, MD    ondansetron (ZOFRAN-ODT) disintegrating tablet 4 mg, 4 mg, Oral, Q8H PRN **OR** ondansetron (ZOFRAN) injection 4 mg, 4 mg, IntraVENous, Q6H PRNEh Ryan S, MD    polyethylene glycol (GLYCOLAX) packet 17 g, 17 g, Oral, Daily PRN, Keenan Stauffer MD    acetaminophen (TYLENOL) tablet 650 mg, 650 mg, Oral, Q6H PRN **OR** acetaminophen (TYLENOL) suppository 650 mg, 650 mg, Rectal, Q6H PRNEh Ryan S, MD    CTA PULMONARY W CONTRAST   Final Result   Addendum (preliminary) 1 of 1   ADDENDUM:   Findings discussed with Dr. Cole on 12/17/2024 6:08 p.m..         Final   1. Pulmonary embolus involving a proximal pulmonary artery branch vessel to   the right upper lobe embolism.   2. Large right pleural effusion with adjacent atelectasis.            CT THORACIC SPINE WO CONTRAST   Final Result   1. No acute compression fractures in the thoracic spine.   2. Mixed lytic and sclerotic metastasis at T9 and small sclerotic metastasis   at the anterior L1 vertebral body.   3. Large right pleural effusion with passive atelectasis of most of the right   lower lobe.   4. Reticular opacities in the dependent medial left lower lung.   5. Incidental cholelithiasis.         CT LUMBAR SPINE WO CONTRAST   Final Result   1. No acute compression fractures detected.   2. Mixed lytic and sclerotic metastasis suspected at T9.  New sclerotic   metastasis measuring 9 mm in the anterior right aspect of L1.   3. Multilevel degenerative changes in the thoracolumbar 
minutes   Therapeutic exercises [id number] 0 minutes       Pt is showing declining progress toward established Physical Therapy goals, unable to gait as far this session, hands on assist required with use of a WW.  Continue with physical therapy current plan of care.    Cody Cortez PTA   License Number: [id number]                            
ondansetron **OR** ondansetron, polyethylene glycol, acetaminophen **OR** acetaminophen    Objective  Most Recent Recorded Vitals  /82   Pulse 93   Temp 97.5 °F (36.4 °C) (Oral)   Resp 18   Ht 1.651 m (5' 5\")   Wt 79.5 kg (175 lb 4.3 oz)   SpO2 95%   BMI 29.17 kg/m²   I/O last 3 completed shifts:  In: 2053.6 [I.V.:2053.6]  Out: 200 [Urine:200]  No intake/output data recorded.    Physical Exam:  General: Asleep, easily awakens to voice, oriented although some situational confusion/forgetfulness is noted, seems tired but comfortable, frail and chronically ill-appearing  Eyes: conjunctivae/corneas clear, sclera non icteric  Skin: warm, dry and pale, no rashes noted  Lungs: clear but diminished to bilateral bases, respirations even and non-labored on 2L NC  Heart: regular to tachycardic rate, regular rhythm, S1S2 present, +murmur noted  Abdomen: soft, non-tender, non-distended, normal bowel sounds  Extremities: Generalized weakness and muscle atrophy noted, no edema  Neurologic: following commands, speech is clear    Most Recent Labs  Lab Results   Component Value Date    WBC 14.2 (H) 12/18/2024    HGB 10.8 (L) 12/18/2024    HCT 34.1 (L) 12/18/2024     12/18/2024     12/18/2024    K 3.9 12/18/2024     12/18/2024    CREATININE 1.0 12/18/2024    BUN 16 12/18/2024    CO2 23 12/18/2024    GLUCOSE 112 (H) 12/18/2024    ALT 14 12/18/2024    AST 17 12/18/2024    INR 1.6 10/23/2024    TSH 0.76 10/23/2024       CTA PULMONARY W CONTRAST   Final Result   Addendum (preliminary) 1 of 1   ADDENDUM:   Findings discussed with Dr. Cole on 12/17/2024 6:08 p.m..         Final   1. Pulmonary embolus involving a proximal pulmonary artery branch vessel to   the right upper lobe embolism.   2. Large right pleural effusion with adjacent atelectasis.            CT THORACIC SPINE WO CONTRAST   Final Result   1. No acute compression fractures in the thoracic spine.   2. Mixed lytic and sclerotic metastasis at T9 
polyethylene glycol (GLYCOLAX) packet 17 g, 17 g, Oral, Daily PRN, Keenan Stauffer MD    acetaminophen (TYLENOL) tablet 650 mg, 650 mg, Oral, Q6H PRN **OR** acetaminophen (TYLENOL) suppository 650 mg, 650 mg, Rectal, Q6H PRN, Keenan Stauffer MD    CTA PULMONARY W CONTRAST   Final Result   Addendum (preliminary) 1 of 1   ADDENDUM:   Findings discussed with Dr. Cole on 12/17/2024 6:08 p.m..         Final   1. Pulmonary embolus involving a proximal pulmonary artery branch vessel to   the right upper lobe embolism.   2. Large right pleural effusion with adjacent atelectasis.            CT THORACIC SPINE WO CONTRAST   Final Result   1. No acute compression fractures in the thoracic spine.   2. Mixed lytic and sclerotic metastasis at T9 and small sclerotic metastasis   at the anterior L1 vertebral body.   3. Large right pleural effusion with passive atelectasis of most of the right   lower lobe.   4. Reticular opacities in the dependent medial left lower lung.   5. Incidental cholelithiasis.         CT LUMBAR SPINE WO CONTRAST   Final Result   1. No acute compression fractures detected.   2. Mixed lytic and sclerotic metastasis suspected at T9.  New sclerotic   metastasis measuring 9 mm in the anterior right aspect of L1.   3. Multilevel degenerative changes in the thoracolumbar spine.   4. Moderate right pleural effusion and atelectasis of the right lower lung.   5. Reticular opacity in the medial left lower lung with some pleural   thickening.   6. Incidental cholelithiasis.         CT ABDOMEN PELVIS WO CONTRAST Additional Contrast? None   Final Result   1. Stable right basilar pleural effusion with subtle irregularity along the   posterior basal parietal pleura once again thought to reflect pleural   metastatic disease.   2. Stable right lower lobe atelectatic changes.   3. Cholelithiasis.   4. No evidence of abdominal or pelvic metastatic disease.   5. Progressive patchy sclerosis in the posterior column of the right 
12/20/2024    AST 20 12/20/2024    INR 1.6 10/23/2024    APTT 86.8 (H) 12/20/2024    TSH 0.76 10/23/2024   Chemistries stable 12/20 vs 12/19  CBC wbc 22.9 vs 24.7 yesterday, Hb decreasd 11.6-->10.7    CTA PULMONARY W CONTRAST   Final Result   Addendum (preliminary) 1 of 1   ADDENDUM:   Findings discussed with Dr. Cole on 12/17/2024 6:08 p.m..         Final   1. Pulmonary embolus involving a proximal pulmonary artery branch vessel to   the right upper lobe embolism.   2. Large right pleural effusion with adjacent atelectasis.            CT THORACIC SPINE WO CONTRAST   Final Result   1. No acute compression fractures in the thoracic spine.   2. Mixed lytic and sclerotic metastasis at T9 and small sclerotic metastasis   at the anterior L1 vertebral body.   3. Large right pleural effusion with passive atelectasis of most of the right   lower lobe.   4. Reticular opacities in the dependent medial left lower lung.   5. Incidental cholelithiasis.         CT LUMBAR SPINE WO CONTRAST   Final Result   1. No acute compression fractures detected.   2. Mixed lytic and sclerotic metastasis suspected at T9.  New sclerotic   metastasis measuring 9 mm in the anterior right aspect of L1.   3. Multilevel degenerative changes in the thoracolumbar spine.   4. Moderate right pleural effusion and atelectasis of the right lower lung.   5. Reticular opacity in the medial left lower lung with some pleural   thickening.   6. Incidental cholelithiasis.         CT ABDOMEN PELVIS WO CONTRAST Additional Contrast? None   Final Result   1. Stable right basilar pleural effusion with subtle irregularity along the   posterior basal parietal pleura once again thought to reflect pleural   metastatic disease.   2. Stable right lower lobe atelectatic changes.   3. Cholelithiasis.   4. No evidence of abdominal or pelvic metastatic disease.   5. Progressive patchy sclerosis in the posterior column of the right   acetabulum highly suspicious for osseous 
    Acute pulmonary embolism without acute cor pulmonale (HCC) [I26.99]     Intractable pain [R52]     Moderate protein-calorie malnutrition (HCC) [E44.0]     COPD (chronic obstructive pulmonary disease) (HCC) [J44.9]     Metastasis to brain (HCC) [C79.31]     Status post stereotactic radiosurgery [Z92.3]     Adenocarcinoma, lung, right (HCC) [C34.91]     Pleural effusion [J90]          Plan  Intractable Back Pain in the setting of Osseous Metastasis  CT lumbar/thoracic spine noted  Continue Baclofen at 5mg TID -- dosing seems to be effective with no side effects  Continue Neurontin  Continue Oxy IR -- dose was adjusted to 10mg Q4H prn per Palliative  PT AM-PAC-- 20/24  OT AM-PAC-- 17/24  Palliative Care consult appreciated    Acute Pulmonary Embolism  CTA chest noted  Resume Heparin drip 12/23 given no further epistaxis -- eventually transition to Eliquis  Pulmonary input appreciated.   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 
SBRT brain left frontal lobe lesion. Patient states he has new small lesions that needed to have repeat SBRT, will hold off until he has a few chemo treatments.   He started Docetaxel and ramucirumab on 12/11, Rolvedon sq 12/12.  12/9/24 PET CT scan: Overall worsening of extensive right sided pleural implants with abnormal metabolic activity. Increased right sided pleural effusion  Patient presents to the ER with intractable pain in his neck that radiates down his spine. He has had frequent falls over the last couple months.   12/17 CT lumbar spine shows new sclerotic metastasis measuring 9 mm in the anterior right aspect of L1.     Plan:  - CBC with diff daily. Leukocytosis is reactive  - Baseline 2L O2, maintain SpO2 >90%  - Acute proximal pulmonary artery branch vessel to Right upper lobe PE, Hep gtt initiated. Will likely transition to Eliquis at d/c  - Palliative following for symptom management/goals of care  - Pulm consult re: Large malignant right pleural effusion with chronic hypoxia.  S/p PleurX placement 12/20  - Continue supportive care  - Plan is to be discharge to [facility] once medically stable.   - Follow up with Dr. Song at [facility] on 12/27, this will need to be rescheduled depending on SNF.  Next chemo treatment is scheduled for 1/3 but will likely be delayed until recovers back to baseline. He is understanding that his treatments and follow ups will be placed on hold while in rehab.     Electronically signed by Patel Del Angel MD on 12/21/2024 at 1:41 PM  
embolism without acute cor pulmonale (HCC) [I26.99]     Intractable pain [R52]     Moderate protein-calorie malnutrition (HCC) [E44.0]     COPD (chronic obstructive pulmonary disease) (HCC) [J44.9]     Metastasis to brain (HCC) [C79.31]     Status post stereotactic radiosurgery [Z92.3]     Adenocarcinoma, lung, right (HCC) [C34.91]     Pleural effusion [J90]          Plan  Intractable Back Pain in the setting of Osseous Metastasis  CT lumbar/thoracic spine noted  Continue Baclofen -- decrease to 5mg TID as ?if leading to some worsening confusion  Continue Neurontin  Continue Oxy IR -- dose was adjusted to 10mg Q4H prn per Palliative; would consider decreasing strength with confusion overnight  PT AM-PAC-- 20/24  OT AM-PAC--  seen 12/19: \"Would benefit from continued skilled OT to increase safety and independence.... decreased independnece and safety during completion of tasks\"  Palliative Care consult appreciated    Acute Pulmonary Embolism  CTA chest noted  Heparin drip held overnight -- likely transition to Eliquis if no plans for procedures  Pulmonary input appreciated.   Currently heparin on hold due to epistaxis.   ENT to see. Resume heparin drip as soon as able.     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 
will hold off until he has a few chemo treatments.   He started Docetaxel and ramucirumab on 12/11, Rolvedon sq 12/12.  12/9/24 PET CT scan: Overall worsening of extensive right sided pleural implants with abnormal metabolic activity. Increased right sided pleural effusion  Patient presents to the ER with intractable pain in his neck that radiates down his spine. He has had frequent falls over the last couple months.   12/17 CT lumbar spine shows new sclerotic metastasis measuring 9 mm in the anterior right aspect of L1.  12/20 s/p pleurX cath placement    Plan:     CBC with diff daily. Leukocytosis trending down  - Baseline 2L O2, maintain SpO2 >90%  - Acute proximal pulmonary artery branch vessel to Right upper lobe PE, Hep gtt. Will likely transition to [facility] at d/c  - Palliative following for symptom management/goals of care  - Pulm consult re: Large malignant right pleural effusion with chronic hypoxia.Drain pleurX every other day  - Continue supportive care  - Plan is to discharge to [facility] once medically stable. He will need to be rescheduled for treatment and a follow up with Dr. Song once he is discharged home. Message sent to clinic to touch base with patient while in rehab.     Collaboration of care with Dr. Song    Electronically signed by JANIE Kate - CNP on 12/23/2024 at 7:13 AM

## 2024-12-26 NOTE — DISCHARGE SUMMARY
Multiplanar reformatted images are provided for review.  MIP images are provided for review. Automated exposure control, iterative reconstruction, and/or weight based adjustment of the mA/kV was utilized to reduce the radiation dose to as low as reasonably achievable. COMPARISON: None. HISTORY: ORDERING SYSTEM PROVIDED HISTORY: eval for PE TECHNOLOGIST PROVIDED HISTORY: Reason for exam:->eval for PE Additional Contrast?->1 FINDINGS: Pulmonary Arteries: Pulmonary arteries are adequately opacified for evaluation.  Pulmonary embolus involving a proximal pulmonary artery branch vessel to the right upper lobe embolism.  Main pulmonary artery is normal in caliber. Mediastinum: No evidence of mediastinal lymphadenopathy.  The heart and pericardium demonstrate no acute abnormality.  There is no acute abnormality of the thoracic aorta. Lungs/pleura: The lungs are without acute process.  No focal consolidation or pulmonary edema.  Large right pleural effusion with adjacent atelectasis.  No pneumothorax. Upper Abdomen: Cholelithiasis in an otherwise normal appearing gallbladder. Soft Tissues/Bones: No acute bone or soft tissue abnormality.  Degenerative changes thoracic spine.     1. Pulmonary embolus involving a proximal pulmonary artery branch vessel to the right upper lobe embolism. 2. Large right pleural effusion with adjacent atelectasis.     CT THORACIC SPINE WO CONTRAST    Result Date: 12/17/2024  EXAMINATION: CT OF THE THORACIC SPINE WITHOUT CONTRAST  12/17/2024 12:23 pm: TECHNIQUE: CT of the thoracic spine was performed without the administration of intravenous contrast. Multiplanar reformatted images are provided for review. Automated exposure control, iterative reconstruction, and/or weight based adjustment of the mA/kV was utilized to reduce the radiation dose to as low as reasonably achievable. COMPARISON: None. HISTORY: ORDERING SYSTEM PROVIDED HISTORY: back pain TECHNOLOGIST PROVIDED HISTORY: Reason for

## 2024-12-26 NOTE — CARE COORDINATION
CASE MANAGEMENT.... Right sided CT remains intact. Drain qod. Tolerating baseline o2 2Lnc. Insurance denied [facility] SAMEER on 12/24/24. P2P offered and needs completed by today. Dr Molina called on 12/24/24. No answer. Had to leave . Await input. If no SAMEER, plan is home with [facility]. Will need sent home with pleurx cath supplies. Started on eliquis. Will follow.     UPDATE.... Met with patient and cousin, Lena, at the bedside and updated them on the above. Free 30 day eliquis discount card provided and [facility] phone number placed in dc instructions for assistance with meds if needed. They stated that going home is NOT an option. If insurance denies p2p and appeal is also denied then plan would be private pay at [facility]. [address] in [place]. PASSR and transport forms in chart. Will follow.

## 2024-12-26 NOTE — CARE COORDINATION
CASE MANAGEMENT..... Per pcp notes, denial for SAMEER was overturned after p2p today and patient is now approved for [facility]. Messaged Marjorie with the facility regarding the above. Facility cannot accept patient until notified of approval from insurance. Patient updated. Placed on \"will call\" with [facility]. Call [phone] when transport needed. Nursing updated. [facility] referral canceled. N 17 in [place]. PASSR and transport forms in chart. Will follow.     UPDATE....[facility] received auth and can accept patient. Nursing to obtain dc order and arrange transport.     Transport arranged for 6:30-8:30 pm tonite. Facility notified.

## 2024-12-26 NOTE — PATIENT CARE CONFERENCE
P Quality Flow/Interdisciplinary Rounds Progress Note        Quality Flow Rounds held on December 26, 2024    Disciplines Attending:  Bedside Nurse, , , and Nursing Unit Leadership    Terell Martinez was admitted on 12/17/2024 10:31 AM    Anticipated Discharge Date:       Disposition:    Tello Score:  Tello Scale Score: 19    [facility] RISK OF UNPLANNED READMISSION 2.0             16.5 Total Score        Discussed patient goal for the day, patient clinical progression, and barriers to discharge.  The following Goal(s) of the Day/Commitment(s) have been identified:   Discharge planning.      Yas Wright RN  December 26, 2024

## 2025-02-10 ENCOUNTER — APPOINTMENT (OUTPATIENT)
Dept: CT IMAGING | Age: 80
End: 2025-02-10
Payer: OTHER GOVERNMENT

## 2025-02-10 ENCOUNTER — HOSPITAL ENCOUNTER (EMERGENCY)
Age: 80
Discharge: HOME | End: 2025-02-10
Attending: EMERGENCY MEDICINE
Payer: OTHER GOVERNMENT

## 2025-02-10 ENCOUNTER — APPOINTMENT (OUTPATIENT)
Dept: GENERAL RADIOLOGY | Age: 80
End: 2025-02-10
Payer: OTHER GOVERNMENT

## 2025-02-10 VITALS
DIASTOLIC BLOOD PRESSURE: 67 MMHG | SYSTOLIC BLOOD PRESSURE: 135 MMHG | BODY MASS INDEX: 26.66 KG/M2 | HEIGHT: 65 IN | OXYGEN SATURATION: 100 % | RESPIRATION RATE: 16 BRPM | WEIGHT: 160 LBS | TEMPERATURE: 97 F | HEART RATE: 90 BPM

## 2025-02-10 DIAGNOSIS — R07.89 CHEST WALL PAIN: Primary | ICD-10-CM

## 2025-02-10 LAB
ALBUMIN SERPL-MCNC: 3.2 G/DL (ref 3.5–5.2)
ALP SERPL-CCNC: 98 U/L (ref 40–129)
ALT SERPL-CCNC: 17 U/L (ref 0–40)
ANION GAP SERPL CALCULATED.3IONS-SCNC: 12 MMOL/L (ref 7–16)
AST SERPL-CCNC: 21 U/L (ref 0–39)
BASOPHILS # BLD: 0.08 K/UL (ref 0–0.2)
BASOPHILS NFR BLD: 0 % (ref 0–2)
BILIRUB SERPL-MCNC: 0.3 MG/DL (ref 0–1.2)
BUN SERPL-MCNC: 16 MG/DL (ref 6–23)
CALCIUM SERPL-MCNC: 9.1 MG/DL (ref 8.6–10.2)
CHLORIDE SERPL-SCNC: 106 MMOL/L (ref 98–107)
CO2 SERPL-SCNC: 22 MMOL/L (ref 22–29)
CREAT SERPL-MCNC: 0.8 MG/DL (ref 0.7–1.2)
EOSINOPHIL # BLD: 0.02 K/UL (ref 0.05–0.5)
EOSINOPHILS RELATIVE PERCENT: 0 % (ref 0–6)
ERYTHROCYTE [DISTWIDTH] IN BLOOD BY AUTOMATED COUNT: 17.8 % (ref 11.5–15)
GFR, ESTIMATED: >90 ML/MIN/1.73M2
GLUCOSE SERPL-MCNC: 84 MG/DL (ref 74–99)
HCT VFR BLD AUTO: 33.7 % (ref 37–54)
HGB BLD-MCNC: 10.2 G/DL (ref 12.5–16.5)
IMM GRANULOCYTES # BLD AUTO: 0.37 K/UL (ref 0–0.58)
IMM GRANULOCYTES NFR BLD: 1 % (ref 0–5)
INR PPP: 6.7
LACTATE BLDV-SCNC: 1.8 MMOL/L (ref 0.5–2.2)
LYMPHOCYTES NFR BLD: 1.57 K/UL (ref 1.5–4)
LYMPHOCYTES RELATIVE PERCENT: 6 % (ref 20–42)
MAGNESIUM SERPL-MCNC: 2.1 MG/DL (ref 1.6–2.6)
MCH RBC QN AUTO: 28.3 PG (ref 26–35)
MCHC RBC AUTO-ENTMCNC: 30.3 G/DL (ref 32–34.5)
MCV RBC AUTO: 93.6 FL (ref 80–99.9)
MONOCYTES NFR BLD: 1.76 K/UL (ref 0.1–0.95)
MONOCYTES NFR BLD: 7 % (ref 2–12)
NEUTROPHILS NFR BLD: 86 % (ref 43–80)
NEUTS SEG NFR BLD: 22.57 K/UL (ref 1.8–7.3)
PLATELET # BLD AUTO: 344 K/UL (ref 130–450)
PMV BLD AUTO: 9.2 FL (ref 7–12)
POTASSIUM SERPL-SCNC: 4.6 MMOL/L (ref 3.5–5)
PROT SERPL-MCNC: 7.5 G/DL (ref 6.4–8.3)
PROTHROMBIN TIME: 72.1 SEC (ref 9.3–12.4)
RBC # BLD AUTO: 3.6 M/UL (ref 3.8–5.8)
RBC # BLD: ABNORMAL 10*6/UL
RBC # BLD: ABNORMAL 10*6/UL
SODIUM SERPL-SCNC: 140 MMOL/L (ref 132–146)
WBC OTHER # BLD: 26.4 K/UL (ref 4.5–11.5)

## 2025-02-10 PROCEDURE — 99285 EMERGENCY DEPT VISIT HI MDM: CPT

## 2025-02-10 PROCEDURE — 85610 PROTHROMBIN TIME: CPT

## 2025-02-10 PROCEDURE — 80053 COMPREHEN METABOLIC PANEL: CPT

## 2025-02-10 PROCEDURE — 93005 ELECTROCARDIOGRAM TRACING: CPT | Performed by: EMERGENCY MEDICINE

## 2025-02-10 PROCEDURE — 83735 ASSAY OF MAGNESIUM: CPT

## 2025-02-10 PROCEDURE — 6360000004 HC RX CONTRAST MEDICATION: Performed by: RADIOLOGY

## 2025-02-10 PROCEDURE — 71275 CT ANGIOGRAPHY CHEST: CPT

## 2025-02-10 PROCEDURE — 71045 X-RAY EXAM CHEST 1 VIEW: CPT

## 2025-02-10 PROCEDURE — 83605 ASSAY OF LACTIC ACID: CPT

## 2025-02-10 PROCEDURE — 85025 COMPLETE CBC W/AUTO DIFF WBC: CPT

## 2025-02-10 RX ORDER — OXYCODONE AND ACETAMINOPHEN 5; 325 MG/1; MG/1
2 TABLET ORAL ONCE
Status: DISCONTINUED | OUTPATIENT
Start: 2025-02-10 | End: 2025-02-10 | Stop reason: HOSPADM

## 2025-02-10 RX ORDER — IOPAMIDOL 755 MG/ML
75 INJECTION, SOLUTION INTRAVASCULAR
Status: COMPLETED | OUTPATIENT
Start: 2025-02-10 | End: 2025-02-10

## 2025-02-10 RX ADMIN — IOPAMIDOL 75 ML: 755 INJECTION, SOLUTION INTRAVENOUS at 14:59

## 2025-02-10 ASSESSMENT — PAIN DESCRIPTION - LOCATION: LOCATION: BACK

## 2025-02-10 ASSESSMENT — PAIN SCALES - GENERAL: PAINLEVEL_OUTOF10: 9

## 2025-02-10 ASSESSMENT — PAIN DESCRIPTION - ORIENTATION: ORIENTATION: RIGHT

## 2025-02-10 ASSESSMENT — PAIN - FUNCTIONAL ASSESSMENT: PAIN_FUNCTIONAL_ASSESSMENT: 0-10

## 2025-02-10 NOTE — ED PROVIDER NOTES
HPI:  2/10/25,   Time: 11:24 AM SHAY Martinez is a 79 y.o. male presenting to the ED for rt chest pain/catheter not draining, beginning days ago.  The complaint has been persistent, moderate in severity, and worsened by nothing.  Known lung CA with right pleural effusions with chronic Pleurx catheter.  Having pain at catheter insertion site.  Oncologist placed on antibiotics a week ago.  Thinks is infected.  No drainage.  No redness.  No fevers chills or sweats.  Told to come for x-ray.    Review of Systems:   Pertinent positives and negatives are stated within HPI, all other systems reviewed and are negative.          --------------------------------------------- PAST HISTORY ---------------------------------------------  Past Medical History:  has a past medical history of Adenocarcinoma, lung, right (HCC), Cancer (HCC), COPD (chronic obstructive pulmonary disease) (HCC), Hyperlipidemia, Secondary cancer of bone (HCC), Secondary cancer of brain (HCC), and Status post stereotactic radiosurgery.    Past Surgical History:  has a past surgical history that includes CT NEEDLE BIOPSY LUNG PERCUTANEOUS (04/05/2024); Cataract extraction (Bilateral, 02/2024); back surgery (2005); Total knee arthroplasty (Left); Colonoscopy; eye surgery; Tonsillectomy; hernia repair; joint replacement; and US INSERT TUNNELED INTRAPERITONEAL CATH W OR WO CONTRAST PERC S&I (12/20/2024).    Social History:  reports that he has been smoking cigarettes. He started smoking about 65 years ago. He has a 63.6 pack-year smoking history. He has never used smokeless tobacco. He reports current alcohol use. He reports current drug use. Drug: Marijuana (Weed).    Family History: family history includes Cancer in his maternal grandmother and paternal grandfather.     The patient’s home medications have been reviewed.    Allergies: Patient has no known allergies.        ---------------------------------------------------PHYSICAL

## 2025-02-11 LAB
EKG ATRIAL RATE: 86 BPM
EKG P AXIS: 34 DEGREES
EKG P-R INTERVAL: 130 MS
EKG Q-T INTERVAL: 366 MS
EKG QRS DURATION: 84 MS
EKG QTC CALCULATION (BAZETT): 437 MS
EKG R AXIS: -16 DEGREES
EKG T AXIS: 27 DEGREES
EKG VENTRICULAR RATE: 86 BPM

## 2025-02-18 ENCOUNTER — HOSPITAL ENCOUNTER (OUTPATIENT)
Dept: GENERAL RADIOLOGY | Age: 80
Discharge: HOME | End: 2025-02-20
Attending: INTERNAL MEDICINE
Payer: MEDICARE

## 2025-02-18 ENCOUNTER — HOSPITAL ENCOUNTER (OUTPATIENT)
Age: 80
Discharge: HOME | End: 2025-02-18
Payer: MEDICARE

## 2025-02-18 VITALS
DIASTOLIC BLOOD PRESSURE: 72 MMHG | HEART RATE: 101 BPM | SYSTOLIC BLOOD PRESSURE: 133 MMHG | OXYGEN SATURATION: 97 % | RESPIRATION RATE: 18 BRPM

## 2025-02-18 DIAGNOSIS — J90 PLEURAL EFFUSION: ICD-10-CM

## 2025-02-18 LAB
INR PPP: 1.4
PROTHROMBIN TIME: 14.8 SEC (ref 9.3–12.4)

## 2025-02-18 PROCEDURE — 2709999900 IR REMOVAL OF TUNNELED PLEURAL CATH W CUFF

## 2025-02-18 PROCEDURE — 85610 PROTHROMBIN TIME: CPT

## 2025-02-18 PROCEDURE — 6360000002 HC RX W HCPCS: Performed by: RADIOLOGY

## 2025-02-18 PROCEDURE — 36415 COLL VENOUS BLD VENIPUNCTURE: CPT

## 2025-02-18 RX ORDER — LIDOCAINE HYDROCHLORIDE 20 MG/ML
INJECTION, SOLUTION INFILTRATION; PERINEURAL PRN
Status: COMPLETED | OUTPATIENT
Start: 2025-02-18 | End: 2025-02-18

## 2025-02-18 RX ADMIN — LIDOCAINE HYDROCHLORIDE 5 ML: 20 INJECTION, SOLUTION INFILTRATION; PERINEURAL at 13:08

## 2025-02-18 NOTE — OR NURSING
Patient to IR for right chest pleurX catheter removal   Vitals checked and consent signed per the patient  Patient positioned supine on the fluoro table  Procedural site prepped and draped   Lidocaine administered to the procedural site   Sutures removed   Right chest pleurX catheter removed per Dr Huitron  DSD applied to the site   Patient tolerated procedure well  Post procedure Xray performed and reviewed per Dr Huitron  Discharge instructions printed and discussed with the patient   Patient discharged w/ all belongings